# Patient Record
Sex: MALE | Race: WHITE | Employment: OTHER | URBAN - METROPOLITAN AREA
[De-identification: names, ages, dates, MRNs, and addresses within clinical notes are randomized per-mention and may not be internally consistent; named-entity substitution may affect disease eponyms.]

---

## 2017-01-01 ENCOUNTER — HOSPITAL ENCOUNTER (OUTPATIENT)
Dept: RADIATION THERAPY | Age: 82
Discharge: HOME | End: 2017-08-30
Payer: MEDICARE

## 2017-01-01 ENCOUNTER — HOSPITAL ENCOUNTER (INPATIENT)
Age: 82
LOS: 7 days | Discharge: HOSPICE | DRG: 092 | End: 2017-09-07
Attending: STUDENT IN AN ORGANIZED HEALTH CARE EDUCATION/TRAINING PROGRAM | Admitting: FAMILY MEDICINE
Payer: MEDICARE

## 2017-01-01 ENCOUNTER — OFFICE VISIT (OUTPATIENT)
Dept: ONCOLOGY | Age: 82
End: 2017-01-01

## 2017-01-01 ENCOUNTER — APPOINTMENT (OUTPATIENT)
Dept: RADIATION THERAPY | Age: 82
End: 2017-01-01
Payer: MEDICARE

## 2017-01-01 ENCOUNTER — HOSPITAL ENCOUNTER (OUTPATIENT)
Dept: RADIATION THERAPY | Age: 82
Discharge: HOME | End: 2017-08-23
Payer: MEDICARE

## 2017-01-01 ENCOUNTER — TELEPHONE (OUTPATIENT)
Dept: ONCOLOGY | Age: 82
End: 2017-01-01

## 2017-01-01 ENCOUNTER — APPOINTMENT (OUTPATIENT)
Dept: CT IMAGING | Age: 82
DRG: 092 | End: 2017-01-01
Attending: STUDENT IN AN ORGANIZED HEALTH CARE EDUCATION/TRAINING PROGRAM
Payer: MEDICARE

## 2017-01-01 ENCOUNTER — HOME CARE VISIT (OUTPATIENT)
Dept: HOSPICE | Facility: HOSPICE | Age: 82
End: 2017-01-01
Payer: MEDICARE

## 2017-01-01 ENCOUNTER — ANESTHESIA EVENT (OUTPATIENT)
Dept: ENDOSCOPY | Age: 82
End: 2017-01-01
Payer: MEDICARE

## 2017-01-01 ENCOUNTER — HOSPITAL ENCOUNTER (OUTPATIENT)
Dept: RADIATION THERAPY | Age: 82
Discharge: HOME | End: 2017-08-31
Payer: MEDICARE

## 2017-01-01 ENCOUNTER — HOME CARE VISIT (OUTPATIENT)
Dept: SCHEDULING | Facility: HOME HEALTH | Age: 82
End: 2017-01-01
Payer: MEDICARE

## 2017-01-01 ENCOUNTER — APPOINTMENT (OUTPATIENT)
Dept: GENERAL RADIOLOGY | Age: 82
DRG: 919 | End: 2017-01-01
Attending: SPECIALIST
Payer: MEDICARE

## 2017-01-01 ENCOUNTER — HOSPITAL ENCOUNTER (OUTPATIENT)
Dept: RADIATION THERAPY | Age: 82
Discharge: HOME | End: 2017-09-13
Payer: MEDICARE

## 2017-01-01 ENCOUNTER — OFFICE VISIT (OUTPATIENT)
Dept: FAMILY MEDICINE CLINIC | Age: 82
End: 2017-01-01

## 2017-01-01 ENCOUNTER — HOME CARE VISIT (OUTPATIENT)
Dept: HOME HEALTH SERVICES | Facility: HOME HEALTH | Age: 82
End: 2017-01-01
Payer: MEDICARE

## 2017-01-01 ENCOUNTER — HOSPITAL ENCOUNTER (INPATIENT)
Age: 82
LOS: 6 days | Discharge: HOME HEALTH | DRG: 919 | End: 2017-08-11
Attending: STUDENT IN AN ORGANIZED HEALTH CARE EDUCATION/TRAINING PROGRAM | Admitting: HOSPITALIST
Payer: MEDICARE

## 2017-01-01 ENCOUNTER — HOME CARE VISIT (OUTPATIENT)
Dept: HOME HEALTH SERVICES | Facility: HOME HEALTH | Age: 82
End: 2017-01-01

## 2017-01-01 ENCOUNTER — HOSPITAL ENCOUNTER (INPATIENT)
Age: 82
LOS: 5 days | Discharge: HOSPICE | End: 2017-10-10
Attending: INTERNAL MEDICINE | Admitting: INTERNAL MEDICINE

## 2017-01-01 ENCOUNTER — HOSPITAL ENCOUNTER (OUTPATIENT)
Dept: RADIATION THERAPY | Age: 82
Discharge: HOME | End: 2017-09-11
Payer: MEDICARE

## 2017-01-01 ENCOUNTER — DOCUMENTATION ONLY (OUTPATIENT)
Dept: ONCOLOGY | Age: 82
End: 2017-01-01

## 2017-01-01 ENCOUNTER — HOSPITAL ENCOUNTER (OUTPATIENT)
Dept: PET IMAGING | Age: 82
Discharge: HOME | End: 2017-07-25
Attending: SURGERY
Payer: MEDICARE

## 2017-01-01 ENCOUNTER — APPOINTMENT (OUTPATIENT)
Dept: RADIATION THERAPY | Age: 82
End: 2017-01-01

## 2017-01-01 ENCOUNTER — ANESTHESIA (OUTPATIENT)
Dept: ENDOSCOPY | Age: 82
End: 2017-01-01
Payer: MEDICARE

## 2017-01-01 ENCOUNTER — HOSPITAL ENCOUNTER (OUTPATIENT)
Age: 82
Setting detail: OUTPATIENT SURGERY
Discharge: HOME | End: 2017-06-28
Attending: INTERNAL MEDICINE | Admitting: INTERNAL MEDICINE
Payer: MEDICARE

## 2017-01-01 ENCOUNTER — HOSPITAL ENCOUNTER (OUTPATIENT)
Dept: INFUSION THERAPY | Age: 82
End: 2017-01-01
Payer: MEDICARE

## 2017-01-01 ENCOUNTER — HOSPITAL ENCOUNTER (OUTPATIENT)
Dept: RADIATION THERAPY | Age: 82
Discharge: HOME | End: 2017-09-06
Payer: MEDICARE

## 2017-01-01 ENCOUNTER — NURSE NAVIGATOR (OUTPATIENT)
Dept: ONCOLOGY | Age: 82
End: 2017-01-01

## 2017-01-01 ENCOUNTER — ANESTHESIA EVENT (OUTPATIENT)
Dept: SURGERY | Age: 82
DRG: 919 | End: 2017-01-01
Payer: MEDICARE

## 2017-01-01 ENCOUNTER — HOSPITAL ENCOUNTER (OUTPATIENT)
Dept: RADIATION THERAPY | Age: 82
Discharge: HOME | End: 2017-09-19
Payer: MEDICARE

## 2017-01-01 ENCOUNTER — HOSPITAL ENCOUNTER (OUTPATIENT)
Dept: ULTRASOUND IMAGING | Age: 82
Discharge: HOME | End: 2017-07-25
Attending: INTERNAL MEDICINE
Payer: MEDICARE

## 2017-01-01 ENCOUNTER — HOSPITAL ENCOUNTER (OUTPATIENT)
Dept: INFUSION THERAPY | Age: 82
Discharge: HOME | End: 2017-08-16
Payer: MEDICARE

## 2017-01-01 ENCOUNTER — OFFICE VISIT (OUTPATIENT)
Dept: SURGERY | Age: 82
End: 2017-01-01

## 2017-01-01 ENCOUNTER — HOME HEALTH ADMISSION (OUTPATIENT)
Dept: HOME HEALTH SERVICES | Facility: HOME HEALTH | Age: 82
End: 2017-01-01
Payer: MEDICARE

## 2017-01-01 ENCOUNTER — HOSPITAL ENCOUNTER (OUTPATIENT)
Dept: RADIATION THERAPY | Age: 82
Discharge: HOME | End: 2017-08-24
Payer: MEDICARE

## 2017-01-01 ENCOUNTER — HOSPITAL ENCOUNTER (OUTPATIENT)
Dept: INFUSION THERAPY | Age: 82
Discharge: HOME | End: 2017-08-24
Payer: MEDICARE

## 2017-01-01 ENCOUNTER — HOSPITAL ENCOUNTER (OUTPATIENT)
Dept: RADIATION THERAPY | Age: 82
Discharge: HOME | End: 2017-09-14
Payer: MEDICARE

## 2017-01-01 ENCOUNTER — HOSPITAL ENCOUNTER (OUTPATIENT)
Dept: RADIATION THERAPY | Age: 82
Discharge: HOME | End: 2017-08-25
Payer: MEDICARE

## 2017-01-01 ENCOUNTER — APPOINTMENT (OUTPATIENT)
Dept: GENERAL RADIOLOGY | Age: 82
DRG: 919 | End: 2017-01-01
Attending: SURGERY
Payer: MEDICARE

## 2017-01-01 ENCOUNTER — HOSPITAL ENCOUNTER (OUTPATIENT)
Dept: INFUSION THERAPY | Age: 82
End: 2017-01-01

## 2017-01-01 ENCOUNTER — ANESTHESIA (OUTPATIENT)
Dept: ENDOSCOPY | Age: 82
DRG: 919 | End: 2017-01-01
Payer: MEDICARE

## 2017-01-01 ENCOUNTER — APPOINTMENT (OUTPATIENT)
Dept: GENERAL RADIOLOGY | Age: 82
DRG: 919 | End: 2017-01-01
Attending: INTERNAL MEDICINE
Payer: MEDICARE

## 2017-01-01 ENCOUNTER — ANESTHESIA (OUTPATIENT)
Dept: SURGERY | Age: 82
DRG: 919 | End: 2017-01-01
Payer: MEDICARE

## 2017-01-01 ENCOUNTER — HOSPITAL ENCOUNTER (OUTPATIENT)
Dept: CT IMAGING | Age: 82
Discharge: HOME | End: 2017-07-06
Attending: INTERNAL MEDICINE
Payer: MEDICARE

## 2017-01-01 ENCOUNTER — NURSE NAVIGATOR (OUTPATIENT)
Dept: FAMILY MEDICINE CLINIC | Age: 82
End: 2017-01-01

## 2017-01-01 ENCOUNTER — HOSPITAL ENCOUNTER (OUTPATIENT)
Age: 82
Setting detail: OUTPATIENT SURGERY
Discharge: HOME | DRG: 919 | End: 2017-08-02
Attending: SURGERY | Admitting: SURGERY
Payer: MEDICARE

## 2017-01-01 ENCOUNTER — HOSPITAL ENCOUNTER (OUTPATIENT)
Age: 82
Setting detail: OUTPATIENT SURGERY
Discharge: HOME | End: 2017-07-31
Attending: INTERNAL MEDICINE | Admitting: INTERNAL MEDICINE
Payer: MEDICARE

## 2017-01-01 ENCOUNTER — HOSPITAL ENCOUNTER (OUTPATIENT)
Dept: RADIATION THERAPY | Age: 82
Discharge: HOME | End: 2017-08-03

## 2017-01-01 ENCOUNTER — APPOINTMENT (OUTPATIENT)
Dept: INFUSION THERAPY | Age: 82
End: 2017-01-01

## 2017-01-01 ENCOUNTER — HOSPICE ADMISSION (OUTPATIENT)
Dept: HOSPICE | Facility: HOSPICE | Age: 82
End: 2017-01-01
Payer: MEDICARE

## 2017-01-01 ENCOUNTER — HOSPITAL ENCOUNTER (OUTPATIENT)
Dept: RADIATION THERAPY | Age: 82
Discharge: HOME | End: 2017-09-01
Payer: MEDICARE

## 2017-01-01 ENCOUNTER — APPOINTMENT (OUTPATIENT)
Dept: GENERAL RADIOLOGY | Age: 82
DRG: 919 | End: 2017-01-01
Attending: ANESTHESIOLOGY
Payer: MEDICARE

## 2017-01-01 ENCOUNTER — APPOINTMENT (OUTPATIENT)
Dept: CT IMAGING | Age: 82
DRG: 919 | End: 2017-01-01
Attending: STUDENT IN AN ORGANIZED HEALTH CARE EDUCATION/TRAINING PROGRAM
Payer: MEDICARE

## 2017-01-01 ENCOUNTER — HOSPITAL ENCOUNTER (OUTPATIENT)
Dept: RADIATION THERAPY | Age: 82
Discharge: HOME | End: 2017-08-29
Payer: MEDICARE

## 2017-01-01 ENCOUNTER — HOSPITAL ENCOUNTER (OUTPATIENT)
Dept: RADIATION THERAPY | Age: 82
Discharge: HOME | End: 2017-09-15
Payer: MEDICARE

## 2017-01-01 ENCOUNTER — ANESTHESIA EVENT (OUTPATIENT)
Dept: ENDOSCOPY | Age: 82
DRG: 919 | End: 2017-01-01
Payer: MEDICARE

## 2017-01-01 ENCOUNTER — HOSPITAL ENCOUNTER (OUTPATIENT)
Dept: INFUSION THERAPY | Age: 82
Discharge: HOME | End: 2017-08-30
Payer: MEDICARE

## 2017-01-01 ENCOUNTER — HOSPITAL ENCOUNTER (OUTPATIENT)
Dept: RADIATION THERAPY | Age: 82
Discharge: HOME | End: 2017-09-07
Payer: MEDICARE

## 2017-01-01 ENCOUNTER — HOSPITAL ENCOUNTER (OUTPATIENT)
Age: 82
Setting detail: OUTPATIENT SURGERY
Discharge: HOME | End: 2017-07-24
Attending: INTERNAL MEDICINE | Admitting: INTERNAL MEDICINE
Payer: MEDICARE

## 2017-01-01 ENCOUNTER — HOSPITAL ENCOUNTER (OUTPATIENT)
Dept: RADIATION THERAPY | Age: 82
Discharge: HOME | End: 2017-09-12
Payer: MEDICARE

## 2017-01-01 ENCOUNTER — HOSPITAL ENCOUNTER (OUTPATIENT)
Dept: INFUSION THERAPY | Age: 82
Discharge: HOME | End: 2017-08-23
Payer: MEDICARE

## 2017-01-01 ENCOUNTER — HOSPITAL ENCOUNTER (OUTPATIENT)
Dept: RADIATION THERAPY | Age: 82
Discharge: HOME | End: 2017-09-05
Payer: MEDICARE

## 2017-01-01 ENCOUNTER — HOSPITAL ENCOUNTER (OUTPATIENT)
Dept: RADIATION THERAPY | Age: 82
Discharge: HOME | End: 2017-08-28
Payer: MEDICARE

## 2017-01-01 ENCOUNTER — HOSPITAL ENCOUNTER (OUTPATIENT)
Dept: RADIATION THERAPY | Age: 82
Discharge: HOME | End: 2017-09-18
Payer: MEDICARE

## 2017-01-01 VITALS
RESPIRATION RATE: 18 BRPM | WEIGHT: 166.23 LBS | BODY MASS INDEX: 22.54 KG/M2 | SYSTOLIC BLOOD PRESSURE: 145 MMHG | OXYGEN SATURATION: 97 % | TEMPERATURE: 97.8 F | DIASTOLIC BLOOD PRESSURE: 94 MMHG | HEART RATE: 88 BPM

## 2017-01-01 VITALS
OXYGEN SATURATION: 95 % | DIASTOLIC BLOOD PRESSURE: 80 MMHG | SYSTOLIC BLOOD PRESSURE: 131 MMHG | BODY MASS INDEX: 24.1 KG/M2 | TEMPERATURE: 98 F | HEART RATE: 88 BPM | WEIGHT: 177.91 LBS | HEIGHT: 72 IN | RESPIRATION RATE: 18 BRPM

## 2017-01-01 VITALS
SYSTOLIC BLOOD PRESSURE: 124 MMHG | HEART RATE: 81 BPM | TEMPERATURE: 98 F | RESPIRATION RATE: 18 BRPM | DIASTOLIC BLOOD PRESSURE: 68 MMHG | OXYGEN SATURATION: 96 %

## 2017-01-01 VITALS
OXYGEN SATURATION: 96 % | TEMPERATURE: 97.4 F | DIASTOLIC BLOOD PRESSURE: 78 MMHG | BODY MASS INDEX: 26.66 KG/M2 | HEART RATE: 91 BPM | HEIGHT: 72 IN | SYSTOLIC BLOOD PRESSURE: 108 MMHG | RESPIRATION RATE: 14 BRPM | WEIGHT: 196.8 LBS

## 2017-01-01 VITALS
HEART RATE: 87 BPM | RESPIRATION RATE: 18 BRPM | DIASTOLIC BLOOD PRESSURE: 60 MMHG | SYSTOLIC BLOOD PRESSURE: 120 MMHG | OXYGEN SATURATION: 98 %

## 2017-01-01 VITALS
DIASTOLIC BLOOD PRESSURE: 29 MMHG | RESPIRATION RATE: 14 BRPM | SYSTOLIC BLOOD PRESSURE: 153 MMHG | TEMPERATURE: 98.3 F | OXYGEN SATURATION: 96 % | HEART RATE: 60 BPM

## 2017-01-01 VITALS
TEMPERATURE: 96.8 F | RESPIRATION RATE: 16 BRPM | OXYGEN SATURATION: 93 % | WEIGHT: 179.2 LBS | HEART RATE: 83 BPM | SYSTOLIC BLOOD PRESSURE: 117 MMHG | HEIGHT: 72 IN | BODY MASS INDEX: 24.27 KG/M2 | DIASTOLIC BLOOD PRESSURE: 71 MMHG

## 2017-01-01 VITALS
WEIGHT: 180.8 LBS | RESPIRATION RATE: 18 BRPM | HEART RATE: 89 BPM | DIASTOLIC BLOOD PRESSURE: 85 MMHG | BODY MASS INDEX: 24.49 KG/M2 | TEMPERATURE: 97.5 F | SYSTOLIC BLOOD PRESSURE: 142 MMHG | HEIGHT: 72 IN | OXYGEN SATURATION: 95 %

## 2017-01-01 VITALS
SYSTOLIC BLOOD PRESSURE: 118 MMHG | HEART RATE: 79 BPM | DIASTOLIC BLOOD PRESSURE: 94 MMHG | OXYGEN SATURATION: 95 % | BODY MASS INDEX: 24.97 KG/M2 | WEIGHT: 184.38 LBS | RESPIRATION RATE: 9 BRPM | HEIGHT: 72 IN | TEMPERATURE: 98.4 F

## 2017-01-01 VITALS
SYSTOLIC BLOOD PRESSURE: 115 MMHG | HEART RATE: 96 BPM | OXYGEN SATURATION: 90 % | DIASTOLIC BLOOD PRESSURE: 60 MMHG | RESPIRATION RATE: 18 BRPM

## 2017-01-01 VITALS
OXYGEN SATURATION: 96 % | DIASTOLIC BLOOD PRESSURE: 78 MMHG | BODY MASS INDEX: 24.79 KG/M2 | WEIGHT: 183 LBS | TEMPERATURE: 97.8 F | RESPIRATION RATE: 16 BRPM | HEART RATE: 83 BPM | SYSTOLIC BLOOD PRESSURE: 129 MMHG | HEIGHT: 72 IN

## 2017-01-01 VITALS
RESPIRATION RATE: 20 BRPM | BODY MASS INDEX: 24.81 KG/M2 | HEIGHT: 72 IN | SYSTOLIC BLOOD PRESSURE: 106 MMHG | WEIGHT: 183.2 LBS | DIASTOLIC BLOOD PRESSURE: 79 MMHG | TEMPERATURE: 97 F | OXYGEN SATURATION: 97 % | HEART RATE: 89 BPM

## 2017-01-01 VITALS
SYSTOLIC BLOOD PRESSURE: 102 MMHG | TEMPERATURE: 97.5 F | HEART RATE: 90 BPM | RESPIRATION RATE: 16 BRPM | WEIGHT: 179.6 LBS | OXYGEN SATURATION: 98 % | BODY MASS INDEX: 24.33 KG/M2 | HEIGHT: 72 IN | DIASTOLIC BLOOD PRESSURE: 66 MMHG

## 2017-01-01 VITALS
TEMPERATURE: 97.9 F | SYSTOLIC BLOOD PRESSURE: 136 MMHG | BODY MASS INDEX: 27.96 KG/M2 | WEIGHT: 206.4 LBS | OXYGEN SATURATION: 98 % | BODY MASS INDEX: 26.01 KG/M2 | OXYGEN SATURATION: 95 % | DIASTOLIC BLOOD PRESSURE: 80 MMHG | WEIGHT: 192 LBS | SYSTOLIC BLOOD PRESSURE: 146 MMHG | HEART RATE: 85 BPM | HEART RATE: 78 BPM | RESPIRATION RATE: 18 BRPM | TEMPERATURE: 98 F | RESPIRATION RATE: 14 BRPM | DIASTOLIC BLOOD PRESSURE: 79 MMHG | HEIGHT: 72 IN | HEIGHT: 72 IN

## 2017-01-01 VITALS
RESPIRATION RATE: 14 BRPM | HEIGHT: 73 IN | SYSTOLIC BLOOD PRESSURE: 155 MMHG | HEART RATE: 80 BPM | TEMPERATURE: 97.9 F | DIASTOLIC BLOOD PRESSURE: 100 MMHG | OXYGEN SATURATION: 91 %

## 2017-01-01 VITALS
OXYGEN SATURATION: 96 % | RESPIRATION RATE: 18 BRPM | HEART RATE: 89 BPM | DIASTOLIC BLOOD PRESSURE: 70 MMHG | SYSTOLIC BLOOD PRESSURE: 130 MMHG

## 2017-01-01 VITALS
HEART RATE: 78 BPM | OXYGEN SATURATION: 96 % | SYSTOLIC BLOOD PRESSURE: 110 MMHG | RESPIRATION RATE: 16 BRPM | DIASTOLIC BLOOD PRESSURE: 70 MMHG | TEMPERATURE: 98 F

## 2017-01-01 VITALS
WEIGHT: 188.8 LBS | TEMPERATURE: 97.7 F | HEART RATE: 88 BPM | RESPIRATION RATE: 14 BRPM | OXYGEN SATURATION: 94 % | HEIGHT: 72 IN | DIASTOLIC BLOOD PRESSURE: 93 MMHG | SYSTOLIC BLOOD PRESSURE: 133 MMHG | BODY MASS INDEX: 25.57 KG/M2

## 2017-01-01 VITALS — HEIGHT: 72 IN | BODY MASS INDEX: 27.09 KG/M2 | WEIGHT: 200 LBS

## 2017-01-01 VITALS
RESPIRATION RATE: 16 BRPM | BODY MASS INDEX: 25.47 KG/M2 | DIASTOLIC BLOOD PRESSURE: 84 MMHG | WEIGHT: 188 LBS | OXYGEN SATURATION: 96 % | TEMPERATURE: 98 F | HEIGHT: 72 IN | HEART RATE: 82 BPM | SYSTOLIC BLOOD PRESSURE: 128 MMHG

## 2017-01-01 VITALS
RESPIRATION RATE: 18 BRPM | DIASTOLIC BLOOD PRESSURE: 80 MMHG | OXYGEN SATURATION: 90 % | HEART RATE: 79 BPM | SYSTOLIC BLOOD PRESSURE: 120 MMHG

## 2017-01-01 VITALS
HEART RATE: 104 BPM | DIASTOLIC BLOOD PRESSURE: 90 MMHG | OXYGEN SATURATION: 96 % | SYSTOLIC BLOOD PRESSURE: 128 MMHG | RESPIRATION RATE: 16 BRPM

## 2017-01-01 VITALS
DIASTOLIC BLOOD PRESSURE: 74 MMHG | HEART RATE: 90 BPM | SYSTOLIC BLOOD PRESSURE: 132 MMHG | BODY MASS INDEX: 22.62 KG/M2 | HEIGHT: 72 IN | WEIGHT: 167 LBS | OXYGEN SATURATION: 96 % | RESPIRATION RATE: 20 BRPM

## 2017-01-01 VITALS
DIASTOLIC BLOOD PRESSURE: 95 MMHG | OXYGEN SATURATION: 95 % | RESPIRATION RATE: 18 BRPM | HEART RATE: 82 BPM | TEMPERATURE: 98.6 F | SYSTOLIC BLOOD PRESSURE: 160 MMHG

## 2017-01-01 DIAGNOSIS — R40.4 TRANSIENT ALTERATION OF AWARENESS: ICD-10-CM

## 2017-01-01 DIAGNOSIS — R41.0 DISORIENTATION: Primary | ICD-10-CM

## 2017-01-01 DIAGNOSIS — Z12.11 ENCOUNTER FOR HEMOCCULT SCREENING: ICD-10-CM

## 2017-01-01 DIAGNOSIS — C16.0 GE JUNCTION CARCINOMA (HCC): Primary | ICD-10-CM

## 2017-01-01 DIAGNOSIS — R47.02 DYSPHASIA: ICD-10-CM

## 2017-01-01 DIAGNOSIS — R11.0 NAUSEA: ICD-10-CM

## 2017-01-01 DIAGNOSIS — Z71.89 ADVANCE DIRECTIVE DISCUSSED WITH PATIENT: ICD-10-CM

## 2017-01-01 DIAGNOSIS — R13.19 ESOPHAGEAL DYSPHAGIA: ICD-10-CM

## 2017-01-01 DIAGNOSIS — T45.1X5A CHEMOTHERAPY-INDUCED NAUSEA: ICD-10-CM

## 2017-01-01 DIAGNOSIS — R10.30 LOWER ABDOMINAL PAIN: Primary | ICD-10-CM

## 2017-01-01 DIAGNOSIS — K21.9 GASTROESOPHAGEAL REFLUX DISEASE WITHOUT ESOPHAGITIS: ICD-10-CM

## 2017-01-01 DIAGNOSIS — Z71.89 ADVANCED DIRECTIVES, COUNSELING/DISCUSSION: ICD-10-CM

## 2017-01-01 DIAGNOSIS — R53.81 PHYSICAL DECONDITIONING: ICD-10-CM

## 2017-01-01 DIAGNOSIS — R41.82 ALTERED MENTAL STATUS, UNSPECIFIED ALTERED MENTAL STATUS TYPE: Primary | ICD-10-CM

## 2017-01-01 DIAGNOSIS — Z00.00 ROUTINE GENERAL MEDICAL EXAMINATION AT A HEALTH CARE FACILITY: Primary | ICD-10-CM

## 2017-01-01 DIAGNOSIS — Z13.5 SCREENING FOR GLAUCOMA: ICD-10-CM

## 2017-01-01 DIAGNOSIS — K92.1 BLOOD IN STOOL: ICD-10-CM

## 2017-01-01 DIAGNOSIS — Z13.39 SCREENING FOR ALCOHOLISM: ICD-10-CM

## 2017-01-01 DIAGNOSIS — K59.09 CHRONIC CONSTIPATION: ICD-10-CM

## 2017-01-01 DIAGNOSIS — H91.93 DECREASED HEARING OF BOTH EARS: ICD-10-CM

## 2017-01-01 DIAGNOSIS — R11.0 CHEMOTHERAPY-INDUCED NAUSEA: ICD-10-CM

## 2017-01-01 DIAGNOSIS — Z23 ENCOUNTER FOR IMMUNIZATION: ICD-10-CM

## 2017-01-01 DIAGNOSIS — R52 PAIN: ICD-10-CM

## 2017-01-01 DIAGNOSIS — Z66 DNR (DO NOT RESUSCITATE): ICD-10-CM

## 2017-01-01 DIAGNOSIS — C16.0 GE JUNCTION CARCINOMA (HCC): ICD-10-CM

## 2017-01-01 DIAGNOSIS — E78.00 HYPERCHOLESTEROLEMIA: ICD-10-CM

## 2017-01-01 DIAGNOSIS — R10.30 LOWER ABDOMINAL PAIN: ICD-10-CM

## 2017-01-01 DIAGNOSIS — R63.4 LOSS OF WEIGHT: ICD-10-CM

## 2017-01-01 DIAGNOSIS — R10.13 ABDOMINAL PAIN, EPIGASTRIC: ICD-10-CM

## 2017-01-01 DIAGNOSIS — R10.13 DYSPEPSIA AND OTHER SPECIFIED DISORDERS OF FUNCTION OF STOMACH: ICD-10-CM

## 2017-01-01 DIAGNOSIS — K31.89 DYSPEPSIA AND OTHER SPECIFIED DISORDERS OF FUNCTION OF STOMACH: ICD-10-CM

## 2017-01-01 DIAGNOSIS — I10 ESSENTIAL HYPERTENSION: ICD-10-CM

## 2017-01-01 LAB
ALBUMIN SERPL BCP-MCNC: 3 G/DL (ref 3.5–5)
ALBUMIN SERPL BCP-MCNC: 3.2 G/DL (ref 3.5–5)
ALBUMIN SERPL-MCNC: 2.9 G/DL (ref 3.5–5)
ALBUMIN SERPL-MCNC: 3 G/DL (ref 3.5–5)
ALBUMIN SERPL-MCNC: 4.5 G/DL (ref 3.5–4.7)
ALBUMIN SERPL-MCNC: 4.7 G/DL (ref 3.5–4.7)
ALBUMIN/GLOB SERPL: 0.8 {RATIO} (ref 1.1–2.2)
ALBUMIN/GLOB SERPL: 0.8 {RATIO} (ref 1.1–2.2)
ALBUMIN/GLOB SERPL: 0.9 {RATIO} (ref 1.1–2.2)
ALBUMIN/GLOB SERPL: 0.9 {RATIO} (ref 1.1–2.2)
ALBUMIN/GLOB SERPL: 1.9 {RATIO} (ref 1.2–2.2)
ALBUMIN/GLOB SERPL: 2.1 {RATIO} (ref 1.2–2.2)
ALP SERPL-CCNC: 105 IU/L (ref 39–117)
ALP SERPL-CCNC: 120 U/L (ref 45–117)
ALP SERPL-CCNC: 123 IU/L (ref 39–117)
ALP SERPL-CCNC: 123 U/L (ref 45–117)
ALP SERPL-CCNC: 132 U/L (ref 45–117)
ALP SERPL-CCNC: 146 U/L (ref 45–117)
ALT SERPL-CCNC: 19 IU/L (ref 0–44)
ALT SERPL-CCNC: 19 IU/L (ref 0–44)
ALT SERPL-CCNC: 48 U/L (ref 12–78)
ALT SERPL-CCNC: 48 U/L (ref 12–78)
ALT SERPL-CCNC: 53 U/L (ref 12–78)
ALT SERPL-CCNC: 53 U/L (ref 12–78)
AMMONIA PLAS-SCNC: 11 UMOL/L
AMMONIA PLAS-SCNC: <10 UMOL/L
AMPHET UR QL SCN: NEGATIVE
AMYLASE SERPL-CCNC: 86 U/L (ref 31–124)
ANION GAP BLD CALC-SCNC: 10 MMOL/L (ref 5–15)
ANION GAP BLD CALC-SCNC: 11 MMOL/L (ref 5–15)
ANION GAP BLD CALC-SCNC: 18 MMOL/L (ref 5–15)
ANION GAP BLD CALC-SCNC: 5 MMOL/L (ref 5–15)
ANION GAP BLD CALC-SCNC: 6 MMOL/L (ref 5–15)
ANION GAP BLD CALC-SCNC: 7 MMOL/L (ref 5–15)
ANION GAP BLD CALC-SCNC: 8 MMOL/L (ref 5–15)
ANION GAP BLD CALC-SCNC: 9 MMOL/L (ref 5–15)
ANION GAP SERPL CALC-SCNC: 13 MMOL/L (ref 5–15)
ANION GAP SERPL CALC-SCNC: 6 MMOL/L (ref 5–15)
ANION GAP SERPL CALC-SCNC: 7 MMOL/L (ref 5–15)
ANION GAP SERPL CALC-SCNC: 8 MMOL/L (ref 5–15)
APAP SERPL-MCNC: <2 UG/ML (ref 10–30)
APPEARANCE UR: CLEAR
APPEARANCE UR: CLEAR
AST SERPL W P-5'-P-CCNC: 37 U/L (ref 15–37)
AST SERPL W P-5'-P-CCNC: 42 U/L (ref 15–37)
AST SERPL-CCNC: 16 IU/L (ref 0–40)
AST SERPL-CCNC: 21 IU/L (ref 0–40)
AST SERPL-CCNC: 29 U/L (ref 15–37)
AST SERPL-CCNC: 37 U/L (ref 15–37)
ATRIAL RATE: 137 BPM
ATRIAL RATE: 74 BPM
ATRIAL RATE: 89 BPM
BACTERIA URNS QL MICRO: NEGATIVE /HPF
BARBITURATES UR QL SCN: NEGATIVE
BASOPHILS # BLD AUTO: 0 K/UL (ref 0–0.1)
BASOPHILS # BLD: 0 % (ref 0–1)
BASOPHILS # BLD: 0 K/UL (ref 0–0.1)
BASOPHILS NFR BLD: 0 % (ref 0–1)
BENZODIAZ UR QL: NEGATIVE
BILIRUB DIRECT SERPL-MCNC: 0.1 MG/DL (ref 0–0.2)
BILIRUB SERPL-MCNC: 0.4 MG/DL (ref 0.2–1)
BILIRUB SERPL-MCNC: 0.5 MG/DL (ref 0.2–1)
BILIRUB SERPL-MCNC: 0.5 MG/DL (ref 0.2–1)
BILIRUB SERPL-MCNC: 0.6 MG/DL (ref 0–1.2)
BILIRUB SERPL-MCNC: 0.6 MG/DL (ref 0–1.2)
BILIRUB SERPL-MCNC: 0.9 MG/DL (ref 0.2–1)
BILIRUB UR QL: NEGATIVE
BILIRUB UR QL: NEGATIVE
BUN BLD-MCNC: 25 MG/DL (ref 9–20)
BUN SERPL-MCNC: 13 MG/DL (ref 6–20)
BUN SERPL-MCNC: 15 MG/DL (ref 6–20)
BUN SERPL-MCNC: 18 MG/DL (ref 6–20)
BUN SERPL-MCNC: 19 MG/DL (ref 6–20)
BUN SERPL-MCNC: 20 MG/DL (ref 6–20)
BUN SERPL-MCNC: 21 MG/DL (ref 8–27)
BUN SERPL-MCNC: 22 MG/DL (ref 6–20)
BUN SERPL-MCNC: 24 MG/DL (ref 6–20)
BUN SERPL-MCNC: 25 MG/DL (ref 8–27)
BUN SERPL-MCNC: 30 MG/DL (ref 6–20)
BUN SERPL-MCNC: 31 MG/DL (ref 6–20)
BUN SERPL-MCNC: 32 MG/DL (ref 6–20)
BUN/CREAT SERPL: 11 (ref 12–20)
BUN/CREAT SERPL: 11 (ref 12–20)
BUN/CREAT SERPL: 13 (ref 10–24)
BUN/CREAT SERPL: 14 (ref 12–20)
BUN/CREAT SERPL: 14 (ref 12–20)
BUN/CREAT SERPL: 15 (ref 10–24)
BUN/CREAT SERPL: 15 (ref 12–20)
BUN/CREAT SERPL: 16 (ref 12–20)
BUN/CREAT SERPL: 16 (ref 12–20)
BUN/CREAT SERPL: 17 (ref 12–20)
BUN/CREAT SERPL: 18 (ref 12–20)
BUN/CREAT SERPL: 18 (ref 12–20)
BUN/CREAT SERPL: 19 (ref 12–20)
BUN/CREAT SERPL: 19 (ref 12–20)
BUN/CREAT SERPL: 20 (ref 12–20)
CA-I BLD-MCNC: 1.2 MMOL/L (ref 1.12–1.32)
CALCIUM SERPL-MCNC: 10 MG/DL (ref 8.6–10.2)
CALCIUM SERPL-MCNC: 8.2 MG/DL (ref 8.5–10.1)
CALCIUM SERPL-MCNC: 8.5 MG/DL (ref 8.5–10.1)
CALCIUM SERPL-MCNC: 8.5 MG/DL (ref 8.5–10.1)
CALCIUM SERPL-MCNC: 8.6 MG/DL (ref 8.5–10.1)
CALCIUM SERPL-MCNC: 8.6 MG/DL (ref 8.5–10.1)
CALCIUM SERPL-MCNC: 8.7 MG/DL (ref 8.5–10.1)
CALCIUM SERPL-MCNC: 8.9 MG/DL (ref 8.5–10.1)
CALCIUM SERPL-MCNC: 8.9 MG/DL (ref 8.5–10.1)
CALCIUM SERPL-MCNC: 9 MG/DL (ref 8.5–10.1)
CALCIUM SERPL-MCNC: 9.1 MG/DL (ref 8.5–10.1)
CALCIUM SERPL-MCNC: 9.1 MG/DL (ref 8.5–10.1)
CALCIUM SERPL-MCNC: 9.3 MG/DL (ref 8.5–10.1)
CALCIUM SERPL-MCNC: 9.4 MG/DL (ref 8.5–10.1)
CALCIUM SERPL-MCNC: 9.5 MG/DL (ref 8.6–10.2)
CALCULATED P AXIS, ECG09: 17 DEGREES
CALCULATED P AXIS, ECG09: 37 DEGREES
CALCULATED P AXIS, ECG09: 63 DEGREES
CALCULATED R AXIS, ECG10: -30 DEGREES
CALCULATED R AXIS, ECG10: -63 DEGREES
CALCULATED R AXIS, ECG10: 4 DEGREES
CALCULATED T AXIS, ECG11: 33 DEGREES
CALCULATED T AXIS, ECG11: 66 DEGREES
CALCULATED T AXIS, ECG11: 76 DEGREES
CANNABINOIDS UR QL SCN: NEGATIVE
CHLORIDE BLD-SCNC: 97 MMOL/L (ref 98–107)
CHLORIDE SERPL-SCNC: 102 MMOL/L (ref 97–108)
CHLORIDE SERPL-SCNC: 103 MMOL/L (ref 97–108)
CHLORIDE SERPL-SCNC: 104 MMOL/L (ref 97–108)
CHLORIDE SERPL-SCNC: 104 MMOL/L (ref 97–108)
CHLORIDE SERPL-SCNC: 105 MMOL/L (ref 97–108)
CHLORIDE SERPL-SCNC: 107 MMOL/L (ref 97–108)
CHLORIDE SERPL-SCNC: 96 MMOL/L (ref 97–108)
CHLORIDE SERPL-SCNC: 97 MMOL/L (ref 96–106)
CHLORIDE SERPL-SCNC: 97 MMOL/L (ref 97–108)
CHLORIDE SERPL-SCNC: 99 MMOL/L (ref 96–106)
CHLORIDE SERPL-SCNC: 99 MMOL/L (ref 97–108)
CHOLEST SERPL-MCNC: 197 MG/DL (ref 100–199)
CO2 BLD-SCNC: 26 MMOL/L (ref 21–32)
CO2 SERPL-SCNC: 21 MMOL/L (ref 21–32)
CO2 SERPL-SCNC: 23 MMOL/L (ref 18–29)
CO2 SERPL-SCNC: 23 MMOL/L (ref 21–32)
CO2 SERPL-SCNC: 24 MMOL/L (ref 21–32)
CO2 SERPL-SCNC: 25 MMOL/L (ref 18–29)
CO2 SERPL-SCNC: 25 MMOL/L (ref 21–32)
CO2 SERPL-SCNC: 26 MMOL/L (ref 21–32)
CO2 SERPL-SCNC: 27 MMOL/L (ref 21–32)
CO2 SERPL-SCNC: 29 MMOL/L (ref 21–32)
CO2 SERPL-SCNC: 30 MMOL/L (ref 21–32)
CO2 SERPL-SCNC: 30 MMOL/L (ref 21–32)
COCAINE UR QL SCN: NEGATIVE
COLOR UR: ABNORMAL
COLOR UR: ABNORMAL
CREAT BLD-MCNC: 1.5 MG/DL (ref 0.6–1.3)
CREAT SERPL-MCNC: 1.17 MG/DL (ref 0.7–1.3)
CREAT SERPL-MCNC: 1.22 MG/DL (ref 0.7–1.3)
CREAT SERPL-MCNC: 1.25 MG/DL (ref 0.7–1.3)
CREAT SERPL-MCNC: 1.31 MG/DL (ref 0.7–1.3)
CREAT SERPL-MCNC: 1.32 MG/DL (ref 0.7–1.3)
CREAT SERPL-MCNC: 1.33 MG/DL (ref 0.7–1.3)
CREAT SERPL-MCNC: 1.34 MG/DL (ref 0.7–1.3)
CREAT SERPL-MCNC: 1.41 MG/DL (ref 0.7–1.3)
CREAT SERPL-MCNC: 1.41 MG/DL (ref 0.7–1.3)
CREAT SERPL-MCNC: 1.5 MG/DL (ref 0.7–1.3)
CREAT SERPL-MCNC: 1.56 MG/DL (ref 0.7–1.3)
CREAT SERPL-MCNC: 1.57 MG/DL (ref 0.7–1.3)
CREAT SERPL-MCNC: 1.63 MG/DL (ref 0.7–1.3)
CREAT SERPL-MCNC: 1.64 MG/DL (ref 0.76–1.27)
CREAT SERPL-MCNC: 1.68 MG/DL (ref 0.76–1.27)
DIAGNOSIS, 93000: NORMAL
DIFFERENTIAL METHOD BLD: ABNORMAL
DRUG SCRN COMMENT,DRGCM: NORMAL
EOSINOPHIL # BLD: 0 K/UL (ref 0–0.4)
EOSINOPHIL # BLD: 0.3 K/UL (ref 0–0.4)
EOSINOPHIL # BLD: 0.4 K/UL (ref 0–0.4)
EOSINOPHIL NFR BLD: 0 % (ref 0–7)
EOSINOPHIL NFR BLD: 3 % (ref 0–7)
EOSINOPHIL NFR BLD: 5 % (ref 0–7)
EOSINOPHIL NFR BLD: 6 % (ref 0–7)
EOSINOPHIL NFR BLD: 8 % (ref 0–7)
EPITH CASTS URNS QL MICRO: ABNORMAL /LPF
ERYTHROCYTE [DISTWIDTH] IN BLOOD BY AUTOMATED COUNT: 12 % (ref 11.5–14.5)
ERYTHROCYTE [DISTWIDTH] IN BLOOD BY AUTOMATED COUNT: 12.1 % (ref 11.5–14.5)
ERYTHROCYTE [DISTWIDTH] IN BLOOD BY AUTOMATED COUNT: 12.2 % (ref 11.5–14.5)
ERYTHROCYTE [DISTWIDTH] IN BLOOD BY AUTOMATED COUNT: 12.2 % (ref 11.5–14.5)
ERYTHROCYTE [DISTWIDTH] IN BLOOD BY AUTOMATED COUNT: 12.3 % (ref 11.5–14.5)
ERYTHROCYTE [DISTWIDTH] IN BLOOD BY AUTOMATED COUNT: 12.4 % (ref 11.5–14.5)
ERYTHROCYTE [DISTWIDTH] IN BLOOD BY AUTOMATED COUNT: 12.9 % (ref 11.5–14.5)
ERYTHROCYTE [DISTWIDTH] IN BLOOD BY AUTOMATED COUNT: 13.1 % (ref 11.5–14.5)
ERYTHROCYTE [DISTWIDTH] IN BLOOD BY AUTOMATED COUNT: 13.2 % (ref 11.5–14.5)
ERYTHROCYTE [DISTWIDTH] IN BLOOD BY AUTOMATED COUNT: 13.3 % (ref 11.5–14.5)
ERYTHROCYTE [DISTWIDTH] IN BLOOD BY AUTOMATED COUNT: 13.3 % (ref 12.3–15.4)
ERYTHROCYTE [DISTWIDTH] IN BLOOD BY AUTOMATED COUNT: 13.7 % (ref 12.3–15.4)
EST. AVERAGE GLUCOSE BLD GHB EST-MCNC: 105 MG/DL
ETHANOL SERPL-MCNC: <10 MG/DL
GLOBULIN SER CALC-MCNC: 2.2 G/DL (ref 1.5–4.5)
GLOBULIN SER CALC-MCNC: 2.4 G/DL (ref 1.5–4.5)
GLOBULIN SER CALC-MCNC: 3.4 G/DL (ref 2–4)
GLOBULIN SER CALC-MCNC: 3.5 G/DL (ref 2–4)
GLOBULIN SER CALC-MCNC: 3.6 G/DL (ref 2–4)
GLOBULIN SER CALC-MCNC: 3.6 G/DL (ref 2–4)
GLUCOSE BLD STRIP.AUTO-MCNC: 100 MG/DL (ref 65–100)
GLUCOSE BLD STRIP.AUTO-MCNC: 102 MG/DL (ref 65–100)
GLUCOSE BLD STRIP.AUTO-MCNC: 103 MG/DL (ref 65–100)
GLUCOSE BLD STRIP.AUTO-MCNC: 105 MG/DL (ref 65–100)
GLUCOSE BLD STRIP.AUTO-MCNC: 106 MG/DL (ref 65–100)
GLUCOSE BLD STRIP.AUTO-MCNC: 107 MG/DL (ref 65–100)
GLUCOSE BLD STRIP.AUTO-MCNC: 108 MG/DL (ref 65–100)
GLUCOSE BLD STRIP.AUTO-MCNC: 111 MG/DL (ref 65–100)
GLUCOSE BLD STRIP.AUTO-MCNC: 111 MG/DL (ref 65–100)
GLUCOSE BLD STRIP.AUTO-MCNC: 113 MG/DL (ref 65–100)
GLUCOSE BLD STRIP.AUTO-MCNC: 115 MG/DL (ref 65–100)
GLUCOSE BLD STRIP.AUTO-MCNC: 115 MG/DL (ref 65–100)
GLUCOSE BLD STRIP.AUTO-MCNC: 116 MG/DL (ref 65–100)
GLUCOSE BLD STRIP.AUTO-MCNC: 116 MG/DL (ref 65–100)
GLUCOSE BLD STRIP.AUTO-MCNC: 129 MG/DL (ref 65–100)
GLUCOSE BLD STRIP.AUTO-MCNC: 130 MG/DL (ref 65–100)
GLUCOSE BLD STRIP.AUTO-MCNC: 132 MG/DL (ref 65–100)
GLUCOSE BLD STRIP.AUTO-MCNC: 132 MG/DL (ref 65–100)
GLUCOSE BLD STRIP.AUTO-MCNC: 138 MG/DL (ref 65–100)
GLUCOSE BLD STRIP.AUTO-MCNC: 145 MG/DL (ref 65–100)
GLUCOSE BLD STRIP.AUTO-MCNC: 149 MG/DL (ref 65–100)
GLUCOSE BLD STRIP.AUTO-MCNC: 164 MG/DL (ref 65–100)
GLUCOSE BLD STRIP.AUTO-MCNC: 88 MG/DL (ref 65–100)
GLUCOSE BLD STRIP.AUTO-MCNC: 95 MG/DL (ref 65–100)
GLUCOSE BLD STRIP.AUTO-MCNC: 98 MG/DL (ref 65–100)
GLUCOSE BLD STRIP.AUTO-MCNC: 99 MG/DL (ref 65–100)
GLUCOSE BLD STRIP.AUTO-MCNC: 99 MG/DL (ref 65–100)
GLUCOSE BLD-MCNC: 118 MG/DL (ref 65–100)
GLUCOSE SERPL-MCNC: 100 MG/DL (ref 65–100)
GLUCOSE SERPL-MCNC: 100 MG/DL (ref 65–100)
GLUCOSE SERPL-MCNC: 101 MG/DL (ref 65–100)
GLUCOSE SERPL-MCNC: 102 MG/DL (ref 65–100)
GLUCOSE SERPL-MCNC: 104 MG/DL (ref 65–100)
GLUCOSE SERPL-MCNC: 104 MG/DL (ref 65–100)
GLUCOSE SERPL-MCNC: 106 MG/DL (ref 65–100)
GLUCOSE SERPL-MCNC: 110 MG/DL (ref 65–100)
GLUCOSE SERPL-MCNC: 139 MG/DL (ref 65–100)
GLUCOSE SERPL-MCNC: 251 MG/DL (ref 65–100)
GLUCOSE SERPL-MCNC: 84 MG/DL (ref 65–100)
GLUCOSE SERPL-MCNC: 85 MG/DL (ref 65–99)
GLUCOSE SERPL-MCNC: 86 MG/DL (ref 65–100)
GLUCOSE SERPL-MCNC: 92 MG/DL (ref 65–100)
GLUCOSE SERPL-MCNC: 99 MG/DL (ref 65–99)
GLUCOSE UR STRIP.AUTO-MCNC: NEGATIVE MG/DL
GLUCOSE UR STRIP.AUTO-MCNC: NEGATIVE MG/DL
HBA1C MFR BLD: 5.3 % (ref 4.2–6.3)
HCT VFR BLD AUTO: 27.2 % (ref 36.6–50.3)
HCT VFR BLD AUTO: 28.5 % (ref 36.6–50.3)
HCT VFR BLD AUTO: 28.8 % (ref 36.6–50.3)
HCT VFR BLD AUTO: 29.1 % (ref 36.6–50.3)
HCT VFR BLD AUTO: 30 % (ref 36.6–50.3)
HCT VFR BLD AUTO: 30.6 % (ref 36.6–50.3)
HCT VFR BLD AUTO: 31.1 % (ref 36.6–50.3)
HCT VFR BLD AUTO: 32.8 % (ref 36.6–50.3)
HCT VFR BLD AUTO: 33.2 % (ref 36.6–50.3)
HCT VFR BLD AUTO: 37.8 % (ref 36.6–50.3)
HCT VFR BLD AUTO: 40.7 % (ref 37.5–51)
HCT VFR BLD AUTO: 40.7 % (ref 37.5–51)
HCT VFR BLD CALC: 38 % (ref 36.6–50.3)
HDLC SERPL-MCNC: 40 MG/DL
HEMOCCULT STL QL IA: NEGATIVE
HGB BLD-MCNC: 10.1 G/DL (ref 12.1–17)
HGB BLD-MCNC: 10.2 G/DL (ref 12.1–17)
HGB BLD-MCNC: 10.2 G/DL (ref 12.1–17)
HGB BLD-MCNC: 10.5 G/DL (ref 12.1–17)
HGB BLD-MCNC: 10.7 G/DL (ref 12.1–17)
HGB BLD-MCNC: 11.5 G/DL (ref 12.1–17)
HGB BLD-MCNC: 11.8 G/DL (ref 12.1–17)
HGB BLD-MCNC: 12.9 GM/DL (ref 12.1–17)
HGB BLD-MCNC: 13.3 G/DL (ref 12.1–17)
HGB BLD-MCNC: 13.8 G/DL (ref 12.6–17.7)
HGB BLD-MCNC: 14 G/DL (ref 12.6–17.7)
HGB BLD-MCNC: 9.6 G/DL (ref 12.1–17)
HGB BLD-MCNC: 9.9 G/DL (ref 12.1–17)
HGB UR QL STRIP: ABNORMAL
HGB UR QL STRIP: NEGATIVE
HYALINE CASTS URNS QL MICRO: ABNORMAL /LPF (ref 0–5)
INR BLD: 1.1 (ref 0.9–1.2)
INR BLD: 1.1 (ref 0.9–1.2)
INTERPRETATION: NORMAL
INTERPRETATION: NORMAL
KETONES UR QL STRIP.AUTO: NEGATIVE MG/DL
KETONES UR QL STRIP.AUTO: NEGATIVE MG/DL
LACTATE SERPL-SCNC: 1.9 MMOL/L (ref 0.4–2)
LEUKOCYTE ESTERASE UR QL STRIP.AUTO: NEGATIVE
LEUKOCYTE ESTERASE UR QL STRIP.AUTO: NEGATIVE
LIPASE SERPL-CCNC: 40 U/L (ref 0–59)
LYMPHOCYTES # BLD AUTO: 12 % (ref 12–49)
LYMPHOCYTES # BLD AUTO: 15 % (ref 12–49)
LYMPHOCYTES # BLD AUTO: 16 % (ref 12–49)
LYMPHOCYTES # BLD AUTO: 2 % (ref 12–49)
LYMPHOCYTES # BLD: 0.1 K/UL (ref 0.8–3.5)
LYMPHOCYTES # BLD: 0.2 K/UL (ref 0.8–3.5)
LYMPHOCYTES # BLD: 0.2 K/UL (ref 0.8–3.5)
LYMPHOCYTES # BLD: 0.3 K/UL (ref 0.8–3.5)
LYMPHOCYTES # BLD: 0.5 K/UL (ref 0.8–3.5)
LYMPHOCYTES # BLD: 0.8 K/UL (ref 0.8–3.5)
LYMPHOCYTES # BLD: 1 K/UL (ref 0.8–3.5)
LYMPHOCYTES # BLD: 1.1 K/UL (ref 0.8–3.5)
LYMPHOCYTES NFR BLD: 10 % (ref 12–49)
LYMPHOCYTES NFR BLD: 2 % (ref 12–49)
LYMPHOCYTES NFR BLD: 6 % (ref 12–49)
LYMPHOCYTES NFR BLD: 9 % (ref 12–49)
MAGNESIUM SERPL-MCNC: 2 MG/DL (ref 1.6–2.4)
MAGNESIUM SERPL-MCNC: 2.1 MG/DL (ref 1.6–2.4)
MCH RBC QN AUTO: 31.4 PG (ref 26–34)
MCH RBC QN AUTO: 31.5 PG (ref 26–34)
MCH RBC QN AUTO: 31.8 PG (ref 26–34)
MCH RBC QN AUTO: 31.8 PG (ref 26–34)
MCH RBC QN AUTO: 32 PG (ref 26–34)
MCH RBC QN AUTO: 32.1 PG (ref 26–34)
MCH RBC QN AUTO: 32.3 PG (ref 26–34)
MCH RBC QN AUTO: 32.4 PG (ref 26–34)
MCH RBC QN AUTO: 32.6 PG (ref 26–34)
MCH RBC QN AUTO: 32.9 PG (ref 26.6–33)
MCH RBC QN AUTO: 32.9 PG (ref 26–34)
MCH RBC QN AUTO: 33.2 PG (ref 26.6–33)
MCHC RBC AUTO-ENTMCNC: 33.7 G/DL (ref 30–36.5)
MCHC RBC AUTO-ENTMCNC: 33.9 G/DL (ref 31.5–35.7)
MCHC RBC AUTO-ENTMCNC: 34.3 G/DL (ref 30–36.5)
MCHC RBC AUTO-ENTMCNC: 34.4 G/DL (ref 30–36.5)
MCHC RBC AUTO-ENTMCNC: 34.4 G/DL (ref 31.5–35.7)
MCHC RBC AUTO-ENTMCNC: 34.7 G/DL (ref 30–36.5)
MCHC RBC AUTO-ENTMCNC: 35.1 G/DL (ref 30–36.5)
MCHC RBC AUTO-ENTMCNC: 35.1 G/DL (ref 30–36.5)
MCHC RBC AUTO-ENTMCNC: 35.2 G/DL (ref 30–36.5)
MCHC RBC AUTO-ENTMCNC: 35.3 G/DL (ref 30–36.5)
MCHC RBC AUTO-ENTMCNC: 35.4 G/DL (ref 30–36.5)
MCHC RBC AUTO-ENTMCNC: 35.5 G/DL (ref 30–36.5)
MCV RBC AUTO: 90.6 FL (ref 80–99)
MCV RBC AUTO: 90.7 FL (ref 80–99)
MCV RBC AUTO: 91.6 FL (ref 80–99)
MCV RBC AUTO: 91.6 FL (ref 80–99)
MCV RBC AUTO: 92.1 FL (ref 80–99)
MCV RBC AUTO: 92.4 FL (ref 80–99)
MCV RBC AUTO: 92.5 FL (ref 80–99)
MCV RBC AUTO: 92.6 FL (ref 80–99)
MCV RBC AUTO: 92.6 FL (ref 80–99)
MCV RBC AUTO: 93.5 FL (ref 80–99)
MCV RBC AUTO: 96 FL (ref 79–97)
MCV RBC AUTO: 97 FL (ref 79–97)
METAMYELOCYTES NFR BLD MANUAL: 1 %
METHADONE UR QL: NEGATIVE
MONOCYTES # BLD: 0 K/UL (ref 0–1)
MONOCYTES # BLD: 0.3 K/UL (ref 0–1)
MONOCYTES # BLD: 0.5 K/UL (ref 0–1)
MONOCYTES # BLD: 0.6 K/UL (ref 0–1)
MONOCYTES # BLD: 0.7 K/UL (ref 0–1)
MONOCYTES # BLD: 0.7 K/UL (ref 0–1)
MONOCYTES NFR BLD AUTO: 1 % (ref 5–13)
MONOCYTES NFR BLD AUTO: 10 % (ref 5–13)
MONOCYTES NFR BLD AUTO: 5 % (ref 5–13)
MONOCYTES NFR BLD AUTO: 8 % (ref 5–13)
MONOCYTES NFR BLD: 1 % (ref 5–13)
MONOCYTES NFR BLD: 1 % (ref 5–13)
MONOCYTES NFR BLD: 6 % (ref 5–13)
MONOCYTES NFR BLD: 8 % (ref 5–13)
NEUTS BAND NFR BLD MANUAL: 30 % (ref 0–6)
NEUTS BAND NFR BLD MANUAL: 6 % (ref 0–6)
NEUTS SEG # BLD: 1.5 K/UL (ref 1.8–8)
NEUTS SEG # BLD: 10.7 K/UL (ref 1.8–8)
NEUTS SEG # BLD: 3.8 K/UL (ref 1.8–8)
NEUTS SEG # BLD: 4.2 K/UL (ref 1.8–8)
NEUTS SEG # BLD: 4.3 K/UL (ref 1.8–8)
NEUTS SEG # BLD: 4.8 K/UL (ref 1.8–8)
NEUTS SEG # BLD: 4.9 K/UL (ref 1.8–8)
NEUTS SEG # BLD: 7.4 K/UL (ref 1.8–8)
NEUTS SEG NFR BLD AUTO: 63 % (ref 32–75)
NEUTS SEG NFR BLD AUTO: 68 % (ref 32–75)
NEUTS SEG NFR BLD AUTO: 72 % (ref 32–75)
NEUTS SEG NFR BLD AUTO: 80 % (ref 32–75)
NEUTS SEG NFR BLD: 76 % (ref 32–75)
NEUTS SEG NFR BLD: 80 % (ref 32–75)
NEUTS SEG NFR BLD: 93 % (ref 32–75)
NEUTS SEG NFR BLD: 97 % (ref 32–75)
NITRITE UR QL STRIP.AUTO: NEGATIVE
NITRITE UR QL STRIP.AUTO: NEGATIVE
OPIATES UR QL: NEGATIVE
P-R INTERVAL, ECG05: 130 MS
P-R INTERVAL, ECG05: 158 MS
P-R INTERVAL, ECG05: 166 MS
PCP UR QL: NEGATIVE
PH UR STRIP: 6 [PH] (ref 5–8)
PH UR STRIP: 6.5 [PH] (ref 5–8)
PHOSPHATE SERPL-MCNC: 2.7 MG/DL (ref 2.6–4.7)
PLATELET # BLD AUTO: 161 K/UL (ref 150–400)
PLATELET # BLD AUTO: 168 K/UL (ref 150–400)
PLATELET # BLD AUTO: 188 K/UL (ref 150–400)
PLATELET # BLD AUTO: 203 X10E3/UL (ref 150–379)
PLATELET # BLD AUTO: 207 K/UL (ref 150–400)
PLATELET # BLD AUTO: 231 K/UL (ref 150–400)
PLATELET # BLD AUTO: 233 X10E3/UL (ref 150–379)
PLATELET # BLD AUTO: 253 K/UL (ref 150–400)
PLATELET # BLD AUTO: 262 K/UL (ref 150–400)
PLATELET # BLD AUTO: 276 K/UL (ref 150–400)
PLATELET # BLD AUTO: 318 K/UL (ref 150–400)
PLATELET # BLD AUTO: 417 K/UL (ref 150–400)
PLATELET COMMENTS,PCOM: ABNORMAL
POTASSIUM BLD-SCNC: 3.9 MMOL/L (ref 3.5–5.1)
POTASSIUM SERPL-SCNC: 3.2 MMOL/L (ref 3.5–5.1)
POTASSIUM SERPL-SCNC: 3.6 MMOL/L (ref 3.5–5.1)
POTASSIUM SERPL-SCNC: 3.9 MMOL/L (ref 3.5–5.1)
POTASSIUM SERPL-SCNC: 4 MMOL/L (ref 3.5–5.1)
POTASSIUM SERPL-SCNC: 4 MMOL/L (ref 3.5–5.1)
POTASSIUM SERPL-SCNC: 4.1 MMOL/L (ref 3.5–5.1)
POTASSIUM SERPL-SCNC: 4.2 MMOL/L (ref 3.5–5.1)
POTASSIUM SERPL-SCNC: 4.2 MMOL/L (ref 3.5–5.1)
POTASSIUM SERPL-SCNC: 4.3 MMOL/L (ref 3.5–5.1)
POTASSIUM SERPL-SCNC: 4.3 MMOL/L (ref 3.5–5.1)
POTASSIUM SERPL-SCNC: 4.4 MMOL/L (ref 3.5–5.1)
POTASSIUM SERPL-SCNC: 4.4 MMOL/L (ref 3.5–5.2)
POTASSIUM SERPL-SCNC: 4.5 MMOL/L (ref 3.5–5.2)
POTASSIUM SERPL-SCNC: 4.6 MMOL/L (ref 3.5–5.1)
POTASSIUM SERPL-SCNC: 5.4 MMOL/L (ref 3.5–5.1)
POTASSIUM SERPL-SCNC: 5.7 MMOL/L (ref 3.5–5.1)
PROT SERPL-MCNC: 6.4 G/DL (ref 6.4–8.2)
PROT SERPL-MCNC: 6.4 G/DL (ref 6.4–8.2)
PROT SERPL-MCNC: 6.6 G/DL (ref 6.4–8.2)
PROT SERPL-MCNC: 6.8 G/DL (ref 6.4–8.2)
PROT SERPL-MCNC: 6.9 G/DL (ref 6–8.5)
PROT SERPL-MCNC: 6.9 G/DL (ref 6–8.5)
PROT UR STRIP-MCNC: NEGATIVE MG/DL
PROT UR STRIP-MCNC: NEGATIVE MG/DL
Q-T INTERVAL, ECG07: 290 MS
Q-T INTERVAL, ECG07: 402 MS
Q-T INTERVAL, ECG07: 408 MS
QRS DURATION, ECG06: 104 MS
QRS DURATION, ECG06: 88 MS
QRS DURATION, ECG06: 94 MS
QTC CALCULATION (BEZET), ECG08: 437 MS
QTC CALCULATION (BEZET), ECG08: 446 MS
QTC CALCULATION (BEZET), ECG08: 496 MS
RBC # BLD AUTO: 3 M/UL (ref 4.1–5.7)
RBC # BLD AUTO: 3.11 M/UL (ref 4.1–5.7)
RBC # BLD AUTO: 3.16 M/UL (ref 4.1–5.7)
RBC # BLD AUTO: 3.18 M/UL (ref 4.1–5.7)
RBC # BLD AUTO: 3.21 M/UL (ref 4.1–5.7)
RBC # BLD AUTO: 3.34 M/UL (ref 4.1–5.7)
RBC # BLD AUTO: 3.36 M/UL (ref 4.1–5.7)
RBC # BLD AUTO: 3.55 M/UL (ref 4.1–5.7)
RBC # BLD AUTO: 3.59 M/UL (ref 4.1–5.7)
RBC # BLD AUTO: 4.08 M/UL (ref 4.1–5.7)
RBC # BLD AUTO: 4.2 X10E6/UL (ref 4.14–5.8)
RBC # BLD AUTO: 4.22 X10E6/UL (ref 4.14–5.8)
RBC #/AREA URNS HPF: ABNORMAL /HPF (ref 0–5)
RBC MORPH BLD: ABNORMAL
SALICYLATES SERPL-MCNC: <1.7 MG/DL (ref 2.8–20)
SERVICE CMNT-IMP: ABNORMAL
SERVICE CMNT-IMP: NORMAL
SODIUM BLD-SCNC: 136 MMOL/L (ref 136–145)
SODIUM SERPL-SCNC: 131 MMOL/L (ref 136–145)
SODIUM SERPL-SCNC: 135 MMOL/L (ref 136–145)
SODIUM SERPL-SCNC: 136 MMOL/L (ref 136–145)
SODIUM SERPL-SCNC: 136 MMOL/L (ref 136–145)
SODIUM SERPL-SCNC: 137 MMOL/L (ref 134–144)
SODIUM SERPL-SCNC: 137 MMOL/L (ref 136–145)
SODIUM SERPL-SCNC: 139 MMOL/L (ref 136–145)
SODIUM SERPL-SCNC: 139 MMOL/L (ref 136–145)
SODIUM SERPL-SCNC: 140 MMOL/L (ref 134–144)
SODIUM SERPL-SCNC: 140 MMOL/L (ref 136–145)
SODIUM SERPL-SCNC: 141 MMOL/L (ref 136–145)
SP GR UR REFRACTOMETRY: 1.01 (ref 1–1.03)
SP GR UR REFRACTOMETRY: 1.02 (ref 1–1.03)
TROPONIN I SERPL-MCNC: <0.04 NG/ML
TSH SERPL DL<=0.005 MIU/L-ACNC: 2.2 UIU/ML (ref 0.45–4.5)
TSH SERPL DL<=0.05 MIU/L-ACNC: 0.54 UIU/ML (ref 0.36–3.74)
UROBILINOGEN UR QL STRIP.AUTO: 1 EU/DL (ref 0.2–1)
UROBILINOGEN UR QL STRIP.AUTO: 2 EU/DL (ref 0.2–1)
VENTRICULAR RATE, ECG03: 137 BPM
VENTRICULAR RATE, ECG03: 74 BPM
VENTRICULAR RATE, ECG03: 89 BPM
WBC # BLD AUTO: 1.8 K/UL (ref 4.1–11.1)
WBC # BLD AUTO: 11.5 K/UL (ref 4.1–11.1)
WBC # BLD AUTO: 14.9 K/UL (ref 4.1–11.1)
WBC # BLD AUTO: 16.4 K/UL (ref 4.1–11.1)
WBC # BLD AUTO: 3.9 K/UL (ref 4.1–11.1)
WBC # BLD AUTO: 4.6 K/UL (ref 4.1–11.1)
WBC # BLD AUTO: 5.4 K/UL (ref 4.1–11.1)
WBC # BLD AUTO: 6.9 K/UL (ref 4.1–11.1)
WBC # BLD AUTO: 6.9 X10E3/UL (ref 3.4–10.8)
WBC # BLD AUTO: 7 K/UL (ref 4.1–11.1)
WBC # BLD AUTO: 7.1 X10E3/UL (ref 3.4–10.8)
WBC # BLD AUTO: 9.2 K/UL (ref 4.1–11.1)
WBC URNS QL MICRO: ABNORMAL /HPF (ref 0–4)

## 2017-01-01 PROCEDURE — 84132 ASSAY OF SERUM POTASSIUM: CPT | Performed by: HOSPITALIST

## 2017-01-01 PROCEDURE — 0651 HSPC ROUTINE HOME CARE

## 2017-01-01 PROCEDURE — A9270 NON-COVERED ITEM OR SERVICE: HCPCS

## 2017-01-01 PROCEDURE — 88305 TISSUE EXAM BY PATHOLOGIST: CPT | Performed by: INTERNAL MEDICINE

## 2017-01-01 PROCEDURE — 78815 PET IMAGE W/CT SKULL-THIGH: CPT

## 2017-01-01 PROCEDURE — 80053 COMPREHEN METABOLIC PANEL: CPT | Performed by: STUDENT IN AN ORGANIZED HEALTH CARE EDUCATION/TRAINING PROGRAM

## 2017-01-01 PROCEDURE — G0299 HHS/HOSPICE OF RN EA 15 MIN: HCPCS

## 2017-01-01 PROCEDURE — 96375 TX/PRO/DX INJ NEW DRUG ADDON: CPT

## 2017-01-01 PROCEDURE — 3336500001 HSPC ELECTION

## 2017-01-01 PROCEDURE — 82962 GLUCOSE BLOOD TEST: CPT

## 2017-01-01 PROCEDURE — 0655 HSPC INPATIENT RESPITE

## 2017-01-01 PROCEDURE — 96417 CHEMO IV INFUS EACH ADDL SEQ: CPT

## 2017-01-01 PROCEDURE — 80048 BASIC METABOLIC PNL TOTAL CA: CPT | Performed by: HOSPITALIST

## 2017-01-01 PROCEDURE — 74011250636 HC RX REV CODE- 250/636: Performed by: FAMILY MEDICINE

## 2017-01-01 PROCEDURE — 74011250637 HC RX REV CODE- 250/637: Performed by: HOSPITALIST

## 2017-01-01 PROCEDURE — 93005 ELECTROCARDIOGRAM TRACING: CPT

## 2017-01-01 PROCEDURE — 92610 EVALUATE SWALLOWING FUNCTION: CPT | Performed by: SPEECH-LANGUAGE PATHOLOGIST

## 2017-01-01 PROCEDURE — 36415 COLL VENOUS BLD VENIPUNCTURE: CPT | Performed by: INTERNAL MEDICINE

## 2017-01-01 PROCEDURE — 36415 COLL VENOUS BLD VENIPUNCTURE: CPT | Performed by: NURSE PRACTITIONER

## 2017-01-01 PROCEDURE — HOSPICE MEDICATION HC HH HOSPICE MEDICATION

## 2017-01-01 PROCEDURE — 77030005123 HC CATH GASTMY PEG BSC -C: Performed by: SPECIALIST

## 2017-01-01 PROCEDURE — 36415 COLL VENOUS BLD VENIPUNCTURE: CPT | Performed by: STUDENT IN AN ORGANIZED HEALTH CARE EDUCATION/TRAINING PROGRAM

## 2017-01-01 PROCEDURE — 74011250637 HC RX REV CODE- 250/637: Performed by: NURSE PRACTITIONER

## 2017-01-01 PROCEDURE — C1769 GUIDE WIRE: HCPCS | Performed by: INTERNAL MEDICINE

## 2017-01-01 PROCEDURE — A6250 SKIN SEAL PROTECT MOISTURIZR: HCPCS

## 2017-01-01 PROCEDURE — 97116 GAIT TRAINING THERAPY: CPT

## 2017-01-01 PROCEDURE — 80307 DRUG TEST PRSMV CHEM ANLYZR: CPT | Performed by: STUDENT IN AN ORGANIZED HEALTH CARE EDUCATION/TRAINING PROGRAM

## 2017-01-01 PROCEDURE — 77386 HC IMRT TRMT DLVR COMPL: CPT

## 2017-01-01 PROCEDURE — 65270000029 HC RM PRIVATE

## 2017-01-01 PROCEDURE — 74011250637 HC RX REV CODE- 250/637: Performed by: INTERNAL MEDICINE

## 2017-01-01 PROCEDURE — 74011000250 HC RX REV CODE- 250: Performed by: INTERNAL MEDICINE

## 2017-01-01 PROCEDURE — 74011000258 HC RX REV CODE- 258: Performed by: NURSE PRACTITIONER

## 2017-01-01 PROCEDURE — G0156 HHCP-SVS OF AIDE,EA 15 MIN: HCPCS

## 2017-01-01 PROCEDURE — HHS10554 SHAMPOO/BODY WASH 8 OZ ALOE VESTA

## 2017-01-01 PROCEDURE — 36415 COLL VENOUS BLD VENIPUNCTURE: CPT | Performed by: FAMILY MEDICINE

## 2017-01-01 PROCEDURE — 36415 COLL VENOUS BLD VENIPUNCTURE: CPT | Performed by: HOSPITALIST

## 2017-01-01 PROCEDURE — 74011250636 HC RX REV CODE- 250/636: Performed by: NURSE PRACTITIONER

## 2017-01-01 PROCEDURE — 81003 URINALYSIS AUTO W/O SCOPE: CPT | Performed by: STUDENT IN AN ORGANIZED HEALTH CARE EDUCATION/TRAINING PROGRAM

## 2017-01-01 PROCEDURE — G0155 HHCP-SVS OF CSW,EA 15 MIN: HCPCS

## 2017-01-01 PROCEDURE — 74011636637 HC RX REV CODE- 636/637: Performed by: FAMILY MEDICINE

## 2017-01-01 PROCEDURE — T4541 LARGE DISPOSABLE UNDERPAD: HCPCS

## 2017-01-01 PROCEDURE — 85025 COMPLETE CBC W/AUTO DIFF WBC: CPT | Performed by: STUDENT IN AN ORGANIZED HEALTH CARE EDUCATION/TRAINING PROGRAM

## 2017-01-01 PROCEDURE — 85025 COMPLETE CBC W/AUTO DIFF WBC: CPT | Performed by: HOSPITALIST

## 2017-01-01 PROCEDURE — 96413 CHEMO IV INFUSION 1 HR: CPT

## 2017-01-01 PROCEDURE — 97166 OT EVAL MOD COMPLEX 45 MIN: CPT

## 2017-01-01 PROCEDURE — 74011250636 HC RX REV CODE- 250/636: Performed by: HOSPITALIST

## 2017-01-01 PROCEDURE — 80307 DRUG TEST PRSMV CHEM ANLYZR: CPT | Performed by: FAMILY MEDICINE

## 2017-01-01 PROCEDURE — 74011250636 HC RX REV CODE- 250/636

## 2017-01-01 PROCEDURE — 96360 HYDRATION IV INFUSION INIT: CPT

## 2017-01-01 PROCEDURE — 83735 ASSAY OF MAGNESIUM: CPT | Performed by: NURSE PRACTITIONER

## 2017-01-01 PROCEDURE — 74020 XR ABD FLAT/ ERECT: CPT

## 2017-01-01 PROCEDURE — 94761 N-INVAS EAR/PLS OXIMETRY MLT: CPT

## 2017-01-01 PROCEDURE — C1874 STENT, COATED/COV W/DEL SYS: HCPCS | Performed by: INTERNAL MEDICINE

## 2017-01-01 PROCEDURE — G8988 SELF CARE GOAL STATUS: HCPCS

## 2017-01-01 PROCEDURE — 85027 COMPLETE CBC AUTOMATED: CPT | Performed by: NURSE PRACTITIONER

## 2017-01-01 PROCEDURE — 77300 RADIATION THERAPY DOSE PLAN: CPT

## 2017-01-01 PROCEDURE — 76060000032 HC ANESTHESIA 0.5 TO 1 HR: Performed by: SPECIALIST

## 2017-01-01 PROCEDURE — 71010 XR CHEST PORT: CPT

## 2017-01-01 PROCEDURE — 77030031656 HC FCPS ENDO GRSP DISP BSC -B: Performed by: SPECIALIST

## 2017-01-01 PROCEDURE — 74011000250 HC RX REV CODE- 250

## 2017-01-01 PROCEDURE — T4526 ADULT SIZE PULL-ON MED: HCPCS

## 2017-01-01 PROCEDURE — 82140 ASSAY OF AMMONIA: CPT | Performed by: FAMILY MEDICINE

## 2017-01-01 PROCEDURE — 81001 URINALYSIS AUTO W/SCOPE: CPT | Performed by: STUDENT IN AN ORGANIZED HEALTH CARE EDUCATION/TRAINING PROGRAM

## 2017-01-01 PROCEDURE — 96361 HYDRATE IV INFUSION ADD-ON: CPT

## 2017-01-01 PROCEDURE — 85025 COMPLETE CBC W/AUTO DIFF WBC: CPT | Performed by: FAMILY MEDICINE

## 2017-01-01 PROCEDURE — 84484 ASSAY OF TROPONIN QUANT: CPT | Performed by: STUDENT IN AN ORGANIZED HEALTH CARE EDUCATION/TRAINING PROGRAM

## 2017-01-01 PROCEDURE — 80048 BASIC METABOLIC PNL TOTAL CA: CPT | Performed by: NURSE PRACTITIONER

## 2017-01-01 PROCEDURE — A6402 STERILE GAUZE <= 16 SQ IN: HCPCS

## 2017-01-01 PROCEDURE — 80076 HEPATIC FUNCTION PANEL: CPT | Performed by: INTERNAL MEDICINE

## 2017-01-01 PROCEDURE — 3E0G76Z INTRODUCTION OF NUTRITIONAL SUBSTANCE INTO UPPER GI, VIA NATURAL OR ARTIFICIAL OPENING: ICD-10-PCS | Performed by: SPECIALIST

## 2017-01-01 PROCEDURE — 65270000032 HC RM SEMIPRIVATE

## 2017-01-01 PROCEDURE — 77030032490 HC SLV COMPR SCD KNE COVD -B

## 2017-01-01 PROCEDURE — 80048 BASIC METABOLIC PNL TOTAL CA: CPT | Performed by: FAMILY MEDICINE

## 2017-01-01 PROCEDURE — 76040000019: Performed by: INTERNAL MEDICINE

## 2017-01-01 PROCEDURE — 77030012965 HC NDL HUBR BBMI -A

## 2017-01-01 PROCEDURE — 76060000031 HC ANESTHESIA FIRST 0.5 HR: Performed by: INTERNAL MEDICINE

## 2017-01-01 PROCEDURE — 70450 CT HEAD/BRAIN W/O DYE: CPT

## 2017-01-01 PROCEDURE — 85025 COMPLETE CBC W/AUTO DIFF WBC: CPT | Performed by: INTERNAL MEDICINE

## 2017-01-01 PROCEDURE — 92610 EVALUATE SWALLOWING FUNCTION: CPT

## 2017-01-01 PROCEDURE — 97530 THERAPEUTIC ACTIVITIES: CPT

## 2017-01-01 PROCEDURE — 85610 PROTHROMBIN TIME: CPT

## 2017-01-01 PROCEDURE — HHS10335 MOUTHWASH  FLAVOR ALCOHOL FREE 4 OZ

## 2017-01-01 PROCEDURE — 76040000007: Performed by: SPECIALIST

## 2017-01-01 PROCEDURE — G8998 SWALLOW D/C STATUS: HCPCS | Performed by: SPEECH-LANGUAGE PATHOLOGIST

## 2017-01-01 PROCEDURE — 74011250636 HC RX REV CODE- 250/636: Performed by: INTERNAL MEDICINE

## 2017-01-01 PROCEDURE — 80048 BASIC METABOLIC PNL TOTAL CA: CPT | Performed by: INTERNAL MEDICINE

## 2017-01-01 PROCEDURE — 74176 CT ABD & PELVIS W/O CONTRAST: CPT

## 2017-01-01 PROCEDURE — 0JH60WZ INSERTION OF TOTALLY IMPLANTABLE VASCULAR ACCESS DEVICE INTO CHEST SUBCUTANEOUS TISSUE AND FASCIA, OPEN APPROACH: ICD-10-PCS | Performed by: SURGERY

## 2017-01-01 PROCEDURE — 0DH63UZ INSERTION OF FEEDING DEVICE INTO STOMACH, PERCUTANEOUS APPROACH: ICD-10-PCS | Performed by: SPECIALIST

## 2017-01-01 PROCEDURE — A4320 IRRIGATION TRAY: HCPCS

## 2017-01-01 PROCEDURE — 74011000250 HC RX REV CODE- 250: Performed by: HOSPITALIST

## 2017-01-01 PROCEDURE — 83036 HEMOGLOBIN GLYCOSYLATED A1C: CPT | Performed by: FAMILY MEDICINE

## 2017-01-01 PROCEDURE — 74011250636 HC RX REV CODE- 250/636: Performed by: STUDENT IN AN ORGANIZED HEALTH CARE EDUCATION/TRAINING PROGRAM

## 2017-01-01 PROCEDURE — 77336 RADIATION PHYSICS CONSULT: CPT

## 2017-01-01 PROCEDURE — 02HV33Z INSERTION OF INFUSION DEVICE INTO SUPERIOR VENA CAVA, PERCUTANEOUS APPROACH: ICD-10-PCS | Performed by: SURGERY

## 2017-01-01 PROCEDURE — 97161 PT EVAL LOW COMPLEX 20 MIN: CPT

## 2017-01-01 PROCEDURE — 77010033678 HC OXYGEN DAILY

## 2017-01-01 PROCEDURE — 65660000000 HC RM CCU STEPDOWN

## 2017-01-01 PROCEDURE — 84443 ASSAY THYROID STIM HORMONE: CPT | Performed by: FAMILY MEDICINE

## 2017-01-01 PROCEDURE — 80053 COMPREHEN METABOLIC PANEL: CPT | Performed by: ANESTHESIOLOGY

## 2017-01-01 PROCEDURE — G0151 HHCP-SERV OF PT,EA 15 MIN: HCPCS

## 2017-01-01 PROCEDURE — 77301 RADIOTHERAPY DOSE PLAN IMRT: CPT

## 2017-01-01 PROCEDURE — 99285 EMERGENCY DEPT VISIT HI MDM: CPT

## 2017-01-01 PROCEDURE — 97535 SELF CARE MNGMENT TRAINING: CPT

## 2017-01-01 PROCEDURE — 400013 HH SOC

## 2017-01-01 PROCEDURE — 83735 ASSAY OF MAGNESIUM: CPT | Performed by: FAMILY MEDICINE

## 2017-01-01 PROCEDURE — 77030009426 HC FCPS BIOP ENDOSC BSC -B: Performed by: INTERNAL MEDICINE

## 2017-01-01 PROCEDURE — 36415 COLL VENOUS BLD VENIPUNCTURE: CPT | Performed by: ANESTHESIOLOGY

## 2017-01-01 PROCEDURE — 74020 XR ABD (AP AND ERECT OR DECUB): CPT

## 2017-01-01 PROCEDURE — 85025 COMPLETE CBC W/AUTO DIFF WBC: CPT | Performed by: ANESTHESIOLOGY

## 2017-01-01 PROCEDURE — 83605 ASSAY OF LACTIC ACID: CPT | Performed by: FAMILY MEDICINE

## 2017-01-01 PROCEDURE — 77030018798 HC PMP KT ENTRL FED COVD -A

## 2017-01-01 PROCEDURE — 84100 ASSAY OF PHOSPHORUS: CPT | Performed by: FAMILY MEDICINE

## 2017-01-01 PROCEDURE — G8987 SELF CARE CURRENT STATUS: HCPCS

## 2017-01-01 PROCEDURE — G8997 SWALLOW GOAL STATUS: HCPCS | Performed by: SPEECH-LANGUAGE PATHOLOGIST

## 2017-01-01 PROCEDURE — 74011250636 HC RX REV CODE- 250/636: Performed by: ANESTHESIOLOGY

## 2017-01-01 PROCEDURE — 0DC68ZZ EXTIRPATION OF MATTER FROM STOMACH, VIA NATURAL OR ARTIFICIAL OPENING ENDOSCOPIC: ICD-10-PCS | Performed by: SPECIALIST

## 2017-01-01 PROCEDURE — G8996 SWALLOW CURRENT STATUS: HCPCS | Performed by: SPEECH-LANGUAGE PATHOLOGIST

## 2017-01-01 PROCEDURE — A4927 NON-STERILE GLOVES: HCPCS

## 2017-01-01 PROCEDURE — 71250 CT THORAX DX C-: CPT

## 2017-01-01 PROCEDURE — 85025 COMPLETE CBC W/AUTO DIFF WBC: CPT | Performed by: NURSE PRACTITIONER

## 2017-01-01 DEVICE — STENT SYSTEM
Type: IMPLANTABLE DEVICE | Site: ESOPHAGUS | Status: FUNCTIONAL
Brand: WALLFLEX™ ESOPHAGEAL

## 2017-01-01 DEVICE — POWERPORT IMPLANTABLE PORT WITH ATTACHABLE 8F CHRONOFLEX OPEN-ENDED SINGLE-LUMEN VENOUS CATHETER INTERMEDIATE KIT (WITHOUT SUTURE PLUGS)
Type: IMPLANTABLE DEVICE | Site: NECK | Status: FUNCTIONAL
Brand: POWERPORT, CHRONOFLEX

## 2017-01-01 RX ORDER — FLUMAZENIL 0.1 MG/ML
0.2 INJECTION INTRAVENOUS
Status: ACTIVE | OUTPATIENT
Start: 2017-01-01 | End: 2017-01-01

## 2017-01-01 RX ORDER — TAMSULOSIN HYDROCHLORIDE 0.4 MG/1
0.8 CAPSULE ORAL DAILY
COMMUNITY

## 2017-01-01 RX ORDER — PROCHLORPERAZINE MALEATE 10 MG
TABLET ORAL
Refills: 3 | COMMUNITY
Start: 2017-01-01 | End: 2017-01-01

## 2017-01-01 RX ORDER — FENTANYL CITRATE 50 UG/ML
50 INJECTION, SOLUTION INTRAMUSCULAR; INTRAVENOUS AS NEEDED
Status: DISCONTINUED | OUTPATIENT
Start: 2017-01-01 | End: 2017-01-01 | Stop reason: HOSPADM

## 2017-01-01 RX ORDER — PROMETHAZINE HYDROCHLORIDE 25 MG/1
25 TABLET ORAL EVERY 6 HOURS
Status: DISCONTINUED | OUTPATIENT
Start: 2017-01-01 | End: 2017-01-01 | Stop reason: HOSPADM

## 2017-01-01 RX ORDER — HALOPERIDOL 5 MG/ML
2 INJECTION INTRAMUSCULAR
Status: DISCONTINUED | OUTPATIENT
Start: 2017-01-01 | End: 2017-01-01 | Stop reason: HOSPADM

## 2017-01-01 RX ORDER — ROPIVACAINE HYDROCHLORIDE 5 MG/ML
150 INJECTION, SOLUTION EPIDURAL; INFILTRATION; PERINEURAL AS NEEDED
Status: DISCONTINUED | OUTPATIENT
Start: 2017-01-01 | End: 2017-01-01 | Stop reason: HOSPADM

## 2017-01-01 RX ORDER — SUCRALFATE 1 G/10ML
1 SUSPENSION ORAL 4 TIMES DAILY
Qty: 414 ML | Refills: 2 | Status: SHIPPED | OUTPATIENT
Start: 2017-01-01 | End: 2017-01-01

## 2017-01-01 RX ORDER — SODIUM CHLORIDE 9 MG/ML
10 INJECTION INTRAMUSCULAR; INTRAVENOUS; SUBCUTANEOUS AS NEEDED
Status: ACTIVE | OUTPATIENT
Start: 2017-01-01 | End: 2017-01-01

## 2017-01-01 RX ORDER — MIDAZOLAM HYDROCHLORIDE 1 MG/ML
.25-1 INJECTION, SOLUTION INTRAMUSCULAR; INTRAVENOUS
Status: ACTIVE | OUTPATIENT
Start: 2017-01-01 | End: 2017-01-01

## 2017-01-01 RX ORDER — NYSTATIN 100000 [USP'U]/G
POWDER TOPICAL EVERY 12 HOURS
COMMUNITY
End: 2017-01-01

## 2017-01-01 RX ORDER — HALOPERIDOL 2 MG/ML
0.5 SOLUTION ORAL
Status: DISCONTINUED | OUTPATIENT
Start: 2017-01-01 | End: 2017-01-01 | Stop reason: HOSPADM

## 2017-01-01 RX ORDER — SODIUM CHLORIDE 9 MG/ML
1000 INJECTION, SOLUTION INTRAVENOUS ONCE
Status: COMPLETED | OUTPATIENT
Start: 2017-01-01 | End: 2017-01-01

## 2017-01-01 RX ORDER — SODIUM CHLORIDE 9 MG/ML
25 INJECTION, SOLUTION INTRAVENOUS CONTINUOUS
Status: DISCONTINUED | OUTPATIENT
Start: 2017-01-01 | End: 2017-01-01 | Stop reason: HOSPADM

## 2017-01-01 RX ORDER — HEPARIN 100 UNIT/ML
500 SYRINGE INTRAVENOUS AS NEEDED
Status: ACTIVE | OUTPATIENT
Start: 2017-01-01 | End: 2017-01-01

## 2017-01-01 RX ORDER — LISINOPRIL 5 MG/1
TABLET ORAL
Refills: 6 | COMMUNITY
Start: 2017-01-01 | End: 2017-01-01

## 2017-01-01 RX ORDER — ASPIRIN 81 MG/1
81 TABLET ORAL
COMMUNITY
End: 2017-01-01

## 2017-01-01 RX ORDER — ATROPINE SULFATE 0.1 MG/ML
0.5 INJECTION INTRAVENOUS
Status: CANCELLED | OUTPATIENT
Start: 2017-01-01 | End: 2017-01-01

## 2017-01-01 RX ORDER — ACETAMINOPHEN 10 MG/ML
1000 INJECTION, SOLUTION INTRAVENOUS ONCE
Status: COMPLETED | OUTPATIENT
Start: 2017-01-01 | End: 2017-01-01

## 2017-01-01 RX ORDER — TAMSULOSIN HYDROCHLORIDE 0.4 MG/1
0.4 CAPSULE ORAL 2 TIMES DAILY
Status: DISCONTINUED | OUTPATIENT
Start: 2017-01-01 | End: 2017-01-01

## 2017-01-01 RX ORDER — SENNOSIDES 8.8 MG/5ML
5 LIQUID ORAL
Status: DISCONTINUED | OUTPATIENT
Start: 2017-01-01 | End: 2017-01-01 | Stop reason: HOSPADM

## 2017-01-01 RX ORDER — SODIUM CHLORIDE 0.9 % (FLUSH) 0.9 %
5-10 SYRINGE (ML) INJECTION AS NEEDED
Status: DISCONTINUED | OUTPATIENT
Start: 2017-01-01 | End: 2017-01-01 | Stop reason: HOSPADM

## 2017-01-01 RX ORDER — NALOXONE HYDROCHLORIDE 0.4 MG/ML
0.4 INJECTION, SOLUTION INTRAMUSCULAR; INTRAVENOUS; SUBCUTANEOUS
Status: DISCONTINUED | OUTPATIENT
Start: 2017-01-01 | End: 2017-01-01 | Stop reason: HOSPADM

## 2017-01-01 RX ORDER — DIPHENHYDRAMINE HYDROCHLORIDE 50 MG/ML
25 INJECTION, SOLUTION INTRAMUSCULAR; INTRAVENOUS ONCE
Status: DISCONTINUED | OUTPATIENT
Start: 2017-01-01 | End: 2017-01-01 | Stop reason: HOSPADM

## 2017-01-01 RX ORDER — ONDANSETRON 2 MG/ML
4 INJECTION INTRAMUSCULAR; INTRAVENOUS ONCE
Status: COMPLETED | OUTPATIENT
Start: 2017-01-01 | End: 2017-01-01

## 2017-01-01 RX ORDER — SODIUM CHLORIDE 9 MG/ML
50 INJECTION, SOLUTION INTRAVENOUS CONTINUOUS
Status: DISPENSED | OUTPATIENT
Start: 2017-01-01 | End: 2017-01-01

## 2017-01-01 RX ORDER — EPINEPHRINE 0.1 MG/ML
1 INJECTION INTRACARDIAC; INTRAVENOUS
Status: DISCONTINUED | OUTPATIENT
Start: 2017-01-01 | End: 2017-01-01 | Stop reason: HOSPADM

## 2017-01-01 RX ORDER — CHOLECALCIFEROL (VITAMIN D3) 25 MCG
TABLET,CHEWABLE ORAL
Refills: 6 | COMMUNITY
Start: 2017-01-01 | End: 2017-01-01 | Stop reason: SDUPTHER

## 2017-01-01 RX ORDER — PROPOFOL 10 MG/ML
INJECTION, EMULSION INTRAVENOUS AS NEEDED
Status: DISCONTINUED | OUTPATIENT
Start: 2017-01-01 | End: 2017-01-01 | Stop reason: HOSPADM

## 2017-01-01 RX ORDER — ESMOLOL HYDROCHLORIDE 10 MG/ML
INJECTION INTRAVENOUS AS NEEDED
Status: DISCONTINUED | OUTPATIENT
Start: 2017-01-01 | End: 2017-01-01 | Stop reason: HOSPADM

## 2017-01-01 RX ORDER — PANTOPRAZOLE SODIUM 40 MG/1
40 TABLET, DELAYED RELEASE ORAL
Status: DISCONTINUED | OUTPATIENT
Start: 2017-01-01 | End: 2017-01-01 | Stop reason: HOSPADM

## 2017-01-01 RX ORDER — TRAMADOL HYDROCHLORIDE 50 MG/1
50 TABLET ORAL
COMMUNITY

## 2017-01-01 RX ORDER — DEXAMETHASONE 4 MG/1
TABLET ORAL
Qty: 30 TAB | Refills: 2 | Status: SHIPPED | OUTPATIENT
Start: 2017-01-01 | End: 2017-01-01

## 2017-01-01 RX ORDER — SCOLOPAMINE TRANSDERMAL SYSTEM 1 MG/1
1.5 PATCH, EXTENDED RELEASE TRANSDERMAL
Status: DISCONTINUED | OUTPATIENT
Start: 2017-01-01 | End: 2017-01-01 | Stop reason: HOSPADM

## 2017-01-01 RX ORDER — LANOLIN ALCOHOL/MO/W.PET/CERES
1000 CREAM (GRAM) TOPICAL DAILY
COMMUNITY
End: 2017-01-01 | Stop reason: SDUPTHER

## 2017-01-01 RX ORDER — SODIUM CHLORIDE 0.9 % (FLUSH) 0.9 %
5-10 SYRINGE (ML) INJECTION EVERY 8 HOURS
Status: DISCONTINUED | OUTPATIENT
Start: 2017-01-01 | End: 2017-01-01 | Stop reason: HOSPADM

## 2017-01-01 RX ORDER — CARVEDILOL 3.12 MG/1
3.12 TABLET ORAL 2 TIMES DAILY
COMMUNITY
End: 2017-01-01

## 2017-01-01 RX ORDER — EPINEPHRINE 0.1 MG/ML
1 INJECTION INTRACARDIAC; INTRAVENOUS
Status: DISCONTINUED | OUTPATIENT
Start: 2017-01-01 | End: 2017-01-01

## 2017-01-01 RX ORDER — HALOPERIDOL 5 MG/ML
2 INJECTION INTRAMUSCULAR ONCE
Status: ACTIVE | OUTPATIENT
Start: 2017-01-01 | End: 2017-01-01

## 2017-01-01 RX ORDER — FENTANYL CITRATE 50 UG/ML
100 INJECTION, SOLUTION INTRAMUSCULAR; INTRAVENOUS
Status: DISCONTINUED | OUTPATIENT
Start: 2017-01-01 | End: 2017-01-01 | Stop reason: HOSPADM

## 2017-01-01 RX ORDER — THERA TABS 400 MCG
1 TAB ORAL
COMMUNITY
End: 2017-01-01

## 2017-01-01 RX ORDER — LISINOPRIL 5 MG/1
TABLET ORAL
Qty: 30 TAB | Refills: 6 | Status: SHIPPED | OUTPATIENT
Start: 2017-01-01 | End: 2017-01-01

## 2017-01-01 RX ORDER — SODIUM CHLORIDE 9 MG/ML
50 INJECTION, SOLUTION INTRAVENOUS CONTINUOUS
Status: DISCONTINUED | OUTPATIENT
Start: 2017-01-01 | End: 2017-01-01 | Stop reason: HOSPADM

## 2017-01-01 RX ORDER — EPINEPHRINE 0.1 MG/ML
1 INJECTION INTRACARDIAC; INTRAVENOUS
Status: ACTIVE | OUTPATIENT
Start: 2017-01-01 | End: 2017-01-01

## 2017-01-01 RX ORDER — TAMSULOSIN HYDROCHLORIDE 0.4 MG/1
0.8 CAPSULE ORAL DAILY
Status: DISCONTINUED | OUTPATIENT
Start: 2017-01-01 | End: 2017-01-01 | Stop reason: HOSPADM

## 2017-01-01 RX ORDER — SUCRALFATE 1 G/10ML
SUSPENSION ORAL 4 TIMES DAILY
Status: ON HOLD | COMMUNITY
End: 2017-01-01

## 2017-01-01 RX ORDER — SODIUM CHLORIDE 0.9 % (FLUSH) 0.9 %
10-40 SYRINGE (ML) INJECTION AS NEEDED
Status: CANCELLED | OUTPATIENT
Start: 2017-01-01 | End: 2017-01-01

## 2017-01-01 RX ORDER — PALONOSETRON 0.05 MG/ML
0.25 INJECTION, SOLUTION INTRAVENOUS ONCE
Status: COMPLETED | OUTPATIENT
Start: 2017-01-01 | End: 2017-01-01

## 2017-01-01 RX ORDER — SODIUM CHLORIDE 9 MG/ML
50 INJECTION, SOLUTION INTRAVENOUS CONTINUOUS
Status: CANCELLED | OUTPATIENT
Start: 2017-01-01 | End: 2017-01-01

## 2017-01-01 RX ORDER — FINASTERIDE 5 MG/1
5 TABLET, FILM COATED ORAL DAILY
COMMUNITY

## 2017-01-01 RX ORDER — DEXTROMETHORPHAN/PSEUDOEPHED 2.5-7.5/.8
1.2 DROPS ORAL
Status: DISCONTINUED | OUTPATIENT
Start: 2017-01-01 | End: 2017-01-01 | Stop reason: SDUPTHER

## 2017-01-01 RX ORDER — SODIUM CHLORIDE, SODIUM LACTATE, POTASSIUM CHLORIDE, CALCIUM CHLORIDE 600; 310; 30; 20 MG/100ML; MG/100ML; MG/100ML; MG/100ML
1000 INJECTION, SOLUTION INTRAVENOUS CONTINUOUS
Status: DISCONTINUED | OUTPATIENT
Start: 2017-01-01 | End: 2017-01-01 | Stop reason: HOSPADM

## 2017-01-01 RX ORDER — MORPHINE SULFATE 2 MG/ML
2 INJECTION, SOLUTION INTRAMUSCULAR; INTRAVENOUS
Status: DISCONTINUED | OUTPATIENT
Start: 2017-01-01 | End: 2017-01-01 | Stop reason: HOSPADM

## 2017-01-01 RX ORDER — DEXTROSE 50 % IN WATER (D50W) INTRAVENOUS SYRINGE
12.5-25 AS NEEDED
Status: DISCONTINUED | OUTPATIENT
Start: 2017-01-01 | End: 2017-01-01 | Stop reason: HOSPADM

## 2017-01-01 RX ORDER — AMOXICILLIN AND CLAVULANATE POTASSIUM 875; 125 MG/1; MG/1
1 TABLET, FILM COATED ORAL EVERY 12 HOURS
Qty: 10 TAB | Refills: 0 | Status: SHIPPED | OUTPATIENT
Start: 2017-01-01 | End: 2017-01-01

## 2017-01-01 RX ORDER — FLUTICASONE PROPIONATE 50 MCG
1 SPRAY, SUSPENSION (ML) NASAL 2 TIMES DAILY
COMMUNITY

## 2017-01-01 RX ORDER — ONDANSETRON 2 MG/ML
4 INJECTION INTRAMUSCULAR; INTRAVENOUS
Status: DISCONTINUED | OUTPATIENT
Start: 2017-01-01 | End: 2017-01-01 | Stop reason: HOSPADM

## 2017-01-01 RX ORDER — FLUMAZENIL 0.1 MG/ML
0.2 INJECTION INTRAVENOUS
Status: DISCONTINUED | OUTPATIENT
Start: 2017-01-01 | End: 2017-01-01 | Stop reason: HOSPADM

## 2017-01-01 RX ORDER — MAGNESIUM SULFATE 100 %
4 CRYSTALS MISCELLANEOUS AS NEEDED
Status: DISCONTINUED | OUTPATIENT
Start: 2017-01-01 | End: 2017-01-01 | Stop reason: HOSPADM

## 2017-01-01 RX ORDER — DICLOFENAC SODIUM 10 MG/G
GEL TOPICAL EVERY 12 HOURS
COMMUNITY
End: 2017-01-01

## 2017-01-01 RX ORDER — PROMETHAZINE HYDROCHLORIDE 25 MG/1
12.5 TABLET ORAL
Status: DISCONTINUED | OUTPATIENT
Start: 2017-01-01 | End: 2017-01-01

## 2017-01-01 RX ORDER — ASPIRIN 81 MG/1
81 TABLET ORAL DAILY
COMMUNITY
End: 2017-01-01

## 2017-01-01 RX ORDER — QUETIAPINE FUMARATE 50 MG/1
50 TABLET, FILM COATED ORAL
Status: DISCONTINUED | OUTPATIENT
Start: 2017-01-01 | End: 2017-01-01

## 2017-01-01 RX ORDER — DEXTROMETHORPHAN/PSEUDOEPHED 2.5-7.5/.8
1.2 DROPS ORAL
Status: DISCONTINUED | OUTPATIENT
Start: 2017-01-01 | End: 2017-01-01 | Stop reason: HOSPADM

## 2017-01-01 RX ORDER — MIDAZOLAM HYDROCHLORIDE 1 MG/ML
0.5 INJECTION, SOLUTION INTRAMUSCULAR; INTRAVENOUS
Status: DISCONTINUED | OUTPATIENT
Start: 2017-01-01 | End: 2017-01-01 | Stop reason: HOSPADM

## 2017-01-01 RX ORDER — FINASTERIDE 5 MG/1
5 TABLET, FILM COATED ORAL DAILY
Status: DISCONTINUED | OUTPATIENT
Start: 2017-01-01 | End: 2017-01-01 | Stop reason: HOSPADM

## 2017-01-01 RX ORDER — MIDAZOLAM HYDROCHLORIDE 1 MG/ML
.25-1 INJECTION, SOLUTION INTRAMUSCULAR; INTRAVENOUS
Status: CANCELLED | OUTPATIENT
Start: 2017-01-01 | End: 2017-01-01

## 2017-01-01 RX ORDER — FENTANYL CITRATE 50 UG/ML
25 INJECTION, SOLUTION INTRAMUSCULAR; INTRAVENOUS
Status: DISCONTINUED | OUTPATIENT
Start: 2017-01-01 | End: 2017-01-01 | Stop reason: HOSPADM

## 2017-01-01 RX ORDER — RANITIDINE 150 MG/1
150 TABLET, FILM COATED ORAL
Qty: 20 TAB | Refills: 0 | Status: SHIPPED | OUTPATIENT
Start: 2017-01-01 | End: 2017-01-01

## 2017-01-01 RX ORDER — POLYETHYLENE GLYCOL 3350 17 G/17G
17 POWDER, FOR SOLUTION ORAL DAILY
Status: DISCONTINUED | OUTPATIENT
Start: 2017-01-01 | End: 2017-01-01

## 2017-01-01 RX ORDER — LIDOCAINE HYDROCHLORIDE 20 MG/ML
INJECTION, SOLUTION EPIDURAL; INFILTRATION; INTRACAUDAL; PERINEURAL AS NEEDED
Status: DISCONTINUED | OUTPATIENT
Start: 2017-01-01 | End: 2017-01-01 | Stop reason: HOSPADM

## 2017-01-01 RX ORDER — QUETIAPINE FUMARATE 25 MG/1
50 TABLET, FILM COATED ORAL
Status: DISCONTINUED | OUTPATIENT
Start: 2017-01-01 | End: 2017-01-01 | Stop reason: HOSPADM

## 2017-01-01 RX ORDER — FENTANYL CITRATE 50 UG/ML
INJECTION, SOLUTION INTRAMUSCULAR; INTRAVENOUS AS NEEDED
Status: DISCONTINUED | OUTPATIENT
Start: 2017-01-01 | End: 2017-01-01 | Stop reason: HOSPADM

## 2017-01-01 RX ORDER — FENTANYL CITRATE 50 UG/ML
200 INJECTION, SOLUTION INTRAMUSCULAR; INTRAVENOUS
Status: ACTIVE | OUTPATIENT
Start: 2017-01-01 | End: 2017-01-01

## 2017-01-01 RX ORDER — DIPHENHYDRAMINE HYDROCHLORIDE 50 MG/ML
25 INJECTION, SOLUTION INTRAMUSCULAR; INTRAVENOUS ONCE
Status: COMPLETED | OUTPATIENT
Start: 2017-01-01 | End: 2017-01-01

## 2017-01-01 RX ORDER — QUETIAPINE FUMARATE 50 MG/1
50 TABLET, FILM COATED ORAL
Qty: 30 TAB | Refills: 0 | Status: SHIPPED | OUTPATIENT
Start: 2017-01-01

## 2017-01-01 RX ORDER — SUCRALFATE 1 G/10ML
1 SUSPENSION ORAL
Qty: 500 ML | Refills: 0 | Status: SHIPPED
Start: 2017-01-01 | End: 2017-01-01 | Stop reason: SDUPTHER

## 2017-01-01 RX ORDER — PANTOPRAZOLE SODIUM 40 MG/1
40 TABLET, DELAYED RELEASE ORAL 2 TIMES DAILY
Status: DISCONTINUED | OUTPATIENT
Start: 2017-01-01 | End: 2017-01-01 | Stop reason: HOSPADM

## 2017-01-01 RX ORDER — PANTOPRAZOLE SODIUM 40 MG/1
40 GRANULE, DELAYED RELEASE ORAL
Status: DISCONTINUED | OUTPATIENT
Start: 2017-01-01 | End: 2017-01-01 | Stop reason: CLARIF

## 2017-01-01 RX ORDER — GABAPENTIN 800 MG/1
TABLET ORAL 3 TIMES DAILY
COMMUNITY
End: 2017-01-01

## 2017-01-01 RX ORDER — ATROPINE SULFATE 0.1 MG/ML
0.5 INJECTION INTRAVENOUS
Status: ACTIVE | OUTPATIENT
Start: 2017-01-01 | End: 2017-01-01

## 2017-01-01 RX ORDER — SODIUM CHLORIDE 9 MG/ML
25 INJECTION, SOLUTION INTRAVENOUS CONTINUOUS
Status: DISPENSED | OUTPATIENT
Start: 2017-01-01 | End: 2017-01-01

## 2017-01-01 RX ORDER — FLUTICASONE PROPIONATE 50 MCG
2 SPRAY, SUSPENSION (ML) NASAL 2 TIMES DAILY
COMMUNITY
End: 2017-01-01

## 2017-01-01 RX ORDER — CHLORPHENIRAMINE MALEATE 4 MG
TABLET ORAL
Refills: 1 | COMMUNITY
Start: 2017-01-01

## 2017-01-01 RX ORDER — ERGOCALCIFEROL 1.25 MG/1
CAPSULE ORAL
Qty: 4 CAP | Refills: 0 | Status: SHIPPED | OUTPATIENT
Start: 2017-01-01 | End: 2017-01-01 | Stop reason: ALTCHOICE

## 2017-01-01 RX ORDER — FUROSEMIDE 10 MG/ML
20 INJECTION INTRAMUSCULAR; INTRAVENOUS ONCE
Status: COMPLETED | OUTPATIENT
Start: 2017-01-01 | End: 2017-01-01

## 2017-01-01 RX ORDER — FINASTERIDE 5 MG/1
5 TABLET, FILM COATED ORAL DAILY
Qty: 30 TAB | Refills: 6 | Status: SHIPPED | OUTPATIENT
Start: 2017-01-01 | End: 2017-01-01

## 2017-01-01 RX ORDER — SUCRALFATE 1 G/10ML
1 SUSPENSION ORAL
Qty: 500 ML | Refills: 0 | Status: SHIPPED | OUTPATIENT
Start: 2017-01-01

## 2017-01-01 RX ORDER — SODIUM CHLORIDE 9 MG/ML
INJECTION, SOLUTION INTRAVENOUS
Status: DISCONTINUED | OUTPATIENT
Start: 2017-01-01 | End: 2017-01-01 | Stop reason: HOSPADM

## 2017-01-01 RX ORDER — TRAMADOL HYDROCHLORIDE 50 MG/1
50 TABLET ORAL
Qty: 120 TAB | Refills: 0 | Status: SHIPPED | OUTPATIENT
Start: 2017-01-01 | End: 2017-01-01

## 2017-01-01 RX ORDER — FEXOFENADINE HCL 60 MG
TABLET ORAL
COMMUNITY
End: 2017-01-01

## 2017-01-01 RX ORDER — MIDAZOLAM HYDROCHLORIDE 1 MG/ML
.25-1 INJECTION, SOLUTION INTRAMUSCULAR; INTRAVENOUS
Status: DISCONTINUED | OUTPATIENT
Start: 2017-01-01 | End: 2017-01-01 | Stop reason: HOSPADM

## 2017-01-01 RX ORDER — POTASSIUM CHLORIDE 20MEQ/15ML
40 LIQUID (ML) ORAL
Status: COMPLETED | OUTPATIENT
Start: 2017-01-01 | End: 2017-01-01

## 2017-01-01 RX ORDER — LIDOCAINE HYDROCHLORIDE 10 MG/ML
0.1 INJECTION, SOLUTION EPIDURAL; INFILTRATION; INTRACAUDAL; PERINEURAL AS NEEDED
Status: DISCONTINUED | OUTPATIENT
Start: 2017-01-01 | End: 2017-01-01 | Stop reason: HOSPADM

## 2017-01-01 RX ORDER — LISINOPRIL 10 MG/1
10 TABLET ORAL DAILY
COMMUNITY
End: 2017-01-01

## 2017-01-01 RX ORDER — CLONIDINE HYDROCHLORIDE 0.1 MG/1
0.1 TABLET ORAL
Status: DISCONTINUED | OUTPATIENT
Start: 2017-01-01 | End: 2017-01-01

## 2017-01-01 RX ORDER — GABAPENTIN 800 MG/1
TABLET ORAL
Qty: 90 TAB | Refills: 4 | Status: SHIPPED | OUTPATIENT
Start: 2017-01-01 | End: 2017-01-01

## 2017-01-01 RX ORDER — AMOXICILLIN AND CLAVULANATE POTASSIUM 875; 125 MG/1; MG/1
1 TABLET, FILM COATED ORAL EVERY 12 HOURS
Status: DISCONTINUED | OUTPATIENT
Start: 2017-01-01 | End: 2017-01-01 | Stop reason: HOSPADM

## 2017-01-01 RX ORDER — INSULIN LISPRO 100 [IU]/ML
INJECTION, SOLUTION INTRAVENOUS; SUBCUTANEOUS
Status: DISCONTINUED | OUTPATIENT
Start: 2017-01-01 | End: 2017-01-01 | Stop reason: HOSPADM

## 2017-01-01 RX ORDER — CLONIDINE HYDROCHLORIDE 0.1 MG/1
0.1 TABLET ORAL
Status: DISCONTINUED | OUTPATIENT
Start: 2017-01-01 | End: 2017-01-01 | Stop reason: HOSPADM

## 2017-01-01 RX ORDER — MORPHINE SULFATE 20 MG/ML
10 SOLUTION ORAL
Status: DISCONTINUED | OUTPATIENT
Start: 2017-01-01 | End: 2017-01-01 | Stop reason: HOSPADM

## 2017-01-01 RX ORDER — DEXTROMETHORPHAN/PSEUDOEPHED 2.5-7.5/.8
1.2 DROPS ORAL
Status: CANCELLED | OUTPATIENT
Start: 2017-01-01

## 2017-01-01 RX ORDER — DIPHENHYDRAMINE HYDROCHLORIDE 50 MG/ML
25 INJECTION, SOLUTION INTRAMUSCULAR; INTRAVENOUS ONCE
Status: DISCONTINUED | OUTPATIENT
Start: 2017-01-01 | End: 2017-01-01

## 2017-01-01 RX ORDER — ONDANSETRON 2 MG/ML
4 INJECTION INTRAMUSCULAR; INTRAVENOUS AS NEEDED
Status: DISCONTINUED | OUTPATIENT
Start: 2017-01-01 | End: 2017-01-01 | Stop reason: HOSPADM

## 2017-01-01 RX ORDER — NALOXONE HYDROCHLORIDE 0.4 MG/ML
0.4 INJECTION, SOLUTION INTRAMUSCULAR; INTRAVENOUS; SUBCUTANEOUS
Status: ACTIVE | OUTPATIENT
Start: 2017-01-01 | End: 2017-01-01

## 2017-01-01 RX ORDER — CEFAZOLIN SODIUM 1 G/3ML
INJECTION, POWDER, FOR SOLUTION INTRAMUSCULAR; INTRAVENOUS AS NEEDED
Status: DISCONTINUED | OUTPATIENT
Start: 2017-01-01 | End: 2017-01-01 | Stop reason: HOSPADM

## 2017-01-01 RX ORDER — LANOLIN ALCOHOL/MO/W.PET/CERES
1000 CREAM (GRAM) TOPICAL DAILY
Qty: 30 TAB | Refills: 6 | Status: SHIPPED | OUTPATIENT
Start: 2017-01-01 | End: 2017-01-01

## 2017-01-01 RX ORDER — HEPARIN 100 UNIT/ML
500 SYRINGE INTRAVENOUS AS NEEDED
Status: CANCELLED | OUTPATIENT
Start: 2017-01-01 | End: 2017-01-01

## 2017-01-01 RX ORDER — SODIUM CHLORIDE 0.9 % (FLUSH) 0.9 %
5-10 SYRINGE (ML) INJECTION AS NEEDED
Status: ACTIVE | OUTPATIENT
Start: 2017-01-01 | End: 2017-01-01

## 2017-01-01 RX ORDER — HEPARIN 100 UNIT/ML
300 SYRINGE INTRAVENOUS ONCE
Status: COMPLETED | OUTPATIENT
Start: 2017-01-01 | End: 2017-01-01

## 2017-01-01 RX ORDER — CHOLECALCIFEROL TAB 125 MCG (5000 UNIT) 125 MCG
5000 TAB ORAL DAILY
COMMUNITY
End: 2017-01-01

## 2017-01-01 RX ORDER — BUPIVACAINE HYDROCHLORIDE AND EPINEPHRINE 2.5; 5 MG/ML; UG/ML
30 INJECTION, SOLUTION EPIDURAL; INFILTRATION; INTRACAUDAL; PERINEURAL ONCE
Status: COMPLETED | OUTPATIENT
Start: 2017-01-01 | End: 2017-01-01

## 2017-01-01 RX ORDER — PANTOPRAZOLE SODIUM 40 MG/1
40 TABLET, DELAYED RELEASE ORAL DAILY
Qty: 30 TAB | Refills: 0 | Status: SHIPPED | OUTPATIENT
Start: 2017-01-01

## 2017-01-01 RX ORDER — LISINOPRIL 10 MG/1
5 TABLET ORAL DAILY
Status: DISCONTINUED | OUTPATIENT
Start: 2017-01-01 | End: 2017-01-01 | Stop reason: HOSPADM

## 2017-01-01 RX ORDER — ATORVASTATIN CALCIUM 10 MG/1
10 TABLET, FILM COATED ORAL DAILY
COMMUNITY
End: 2017-01-01

## 2017-01-01 RX ORDER — SODIUM CHLORIDE, SODIUM LACTATE, POTASSIUM CHLORIDE, CALCIUM CHLORIDE 600; 310; 30; 20 MG/100ML; MG/100ML; MG/100ML; MG/100ML
100 INJECTION, SOLUTION INTRAVENOUS CONTINUOUS
Status: DISCONTINUED | OUTPATIENT
Start: 2017-01-01 | End: 2017-01-01 | Stop reason: HOSPADM

## 2017-01-01 RX ORDER — ATROPINE SULFATE 0.1 MG/ML
0.5 INJECTION INTRAVENOUS
Status: DISCONTINUED | OUTPATIENT
Start: 2017-01-01 | End: 2017-01-01 | Stop reason: HOSPADM

## 2017-01-01 RX ORDER — SODIUM CHLORIDE 0.9 % (FLUSH) 0.9 %
5-10 SYRINGE (ML) INJECTION AS NEEDED
Status: CANCELLED | OUTPATIENT
Start: 2017-01-01 | End: 2017-01-01

## 2017-01-01 RX ORDER — SODIUM CHLORIDE 9 MG/ML
75 INJECTION, SOLUTION INTRAVENOUS CONTINUOUS
Status: DISCONTINUED | OUTPATIENT
Start: 2017-01-01 | End: 2017-01-01

## 2017-01-01 RX ORDER — FLUMAZENIL 0.1 MG/ML
0.2 INJECTION INTRAVENOUS
Status: CANCELLED | OUTPATIENT
Start: 2017-01-01 | End: 2017-01-01

## 2017-01-01 RX ORDER — SODIUM CHLORIDE, SODIUM LACTATE, POTASSIUM CHLORIDE, CALCIUM CHLORIDE 600; 310; 30; 20 MG/100ML; MG/100ML; MG/100ML; MG/100ML
INJECTION, SOLUTION INTRAVENOUS
Status: DISCONTINUED | OUTPATIENT
Start: 2017-01-01 | End: 2017-01-01 | Stop reason: HOSPADM

## 2017-01-01 RX ORDER — PANTOPRAZOLE SODIUM 40 MG/1
40 TABLET, DELAYED RELEASE ORAL DAILY
Qty: 60 TAB | Refills: 0 | Status: SHIPPED | OUTPATIENT
Start: 2017-01-01 | End: 2017-01-01 | Stop reason: SDUPTHER

## 2017-01-01 RX ORDER — DEXAMETHASONE SODIUM PHOSPHATE 4 MG/ML
12 INJECTION, SOLUTION INTRA-ARTICULAR; INTRALESIONAL; INTRAMUSCULAR; INTRAVENOUS; SOFT TISSUE ONCE
Status: COMPLETED | OUTPATIENT
Start: 2017-01-01 | End: 2017-01-01

## 2017-01-01 RX ORDER — SODIUM CHLORIDE 0.9 % (FLUSH) 0.9 %
5-10 SYRINGE (ML) INJECTION EVERY 8 HOURS
Status: ACTIVE | OUTPATIENT
Start: 2017-01-01 | End: 2017-01-01

## 2017-01-01 RX ORDER — MULTIVITAMIN
TABLET ORAL
Refills: 11 | COMMUNITY
Start: 2017-01-01 | End: 2017-01-01 | Stop reason: SDUPTHER

## 2017-01-01 RX ORDER — MIDAZOLAM HYDROCHLORIDE 1 MG/ML
1 INJECTION, SOLUTION INTRAMUSCULAR; INTRAVENOUS AS NEEDED
Status: DISCONTINUED | OUTPATIENT
Start: 2017-01-01 | End: 2017-01-01 | Stop reason: HOSPADM

## 2017-01-01 RX ORDER — ASPIRIN 81 MG/1
81 TABLET ORAL DAILY
Status: DISCONTINUED | OUTPATIENT
Start: 2017-01-01 | End: 2017-01-01 | Stop reason: HOSPADM

## 2017-01-01 RX ORDER — PALONOSETRON 0.05 MG/ML
0.25 INJECTION, SOLUTION INTRAVENOUS ONCE
Status: DISCONTINUED | OUTPATIENT
Start: 2017-01-01 | End: 2017-01-01 | Stop reason: HOSPADM

## 2017-01-01 RX ORDER — QUETIAPINE FUMARATE 25 MG/1
50 TABLET, FILM COATED ORAL
Status: DISCONTINUED | OUTPATIENT
Start: 2017-01-01 | End: 2017-01-01 | Stop reason: CLARIF

## 2017-01-01 RX ORDER — PALONOSETRON 0.05 MG/ML
0.25 INJECTION, SOLUTION INTRAVENOUS ONCE
Status: DISCONTINUED | OUTPATIENT
Start: 2017-01-01 | End: 2017-01-01

## 2017-01-01 RX ORDER — MULTIVITAMIN
1 TABLET ORAL 2 TIMES DAILY
Qty: 60 TAB | Refills: 11 | Status: SHIPPED | OUTPATIENT
Start: 2017-01-01 | End: 2017-01-01

## 2017-01-01 RX ORDER — FINASTERIDE 5 MG/1
5 TABLET, FILM COATED ORAL DAILY
Status: DISCONTINUED | OUTPATIENT
Start: 2017-01-01 | End: 2017-01-01

## 2017-01-01 RX ORDER — PANTOPRAZOLE SODIUM 40 MG/1
40 TABLET, DELAYED RELEASE ORAL
Status: DISCONTINUED | OUTPATIENT
Start: 2017-01-01 | End: 2017-01-01

## 2017-01-01 RX ORDER — CHLORPHENIRAMINE MALEATE 4 MG
TABLET ORAL
Qty: 60 TAB | Refills: 1 | Status: SHIPPED | OUTPATIENT
Start: 2017-01-01 | End: 2017-01-01

## 2017-01-01 RX ORDER — PROMETHAZINE HYDROCHLORIDE 25 MG/1
25 TABLET ORAL
Status: DISCONTINUED | OUTPATIENT
Start: 2017-01-01 | End: 2017-01-01

## 2017-01-01 RX ORDER — CEFAZOLIN SODIUM IN 0.9 % NACL 2 G/50 ML
2 INTRAVENOUS SOLUTION, PIGGYBACK (ML) INTRAVENOUS ONCE
Status: COMPLETED | OUTPATIENT
Start: 2017-01-01 | End: 2017-01-01

## 2017-01-01 RX ORDER — PANTOPRAZOLE SODIUM 40 MG/1
40 TABLET, DELAYED RELEASE ORAL 2 TIMES DAILY
Status: ON HOLD | COMMUNITY
End: 2017-01-01

## 2017-01-01 RX ORDER — BUMETANIDE 1 MG/1
1 TABLET ORAL
COMMUNITY
End: 2017-01-01

## 2017-01-01 RX ORDER — HYDROMORPHONE HYDROCHLORIDE 1 MG/ML
0.2 INJECTION, SOLUTION INTRAMUSCULAR; INTRAVENOUS; SUBCUTANEOUS
Status: ACTIVE | OUTPATIENT
Start: 2017-01-01 | End: 2017-01-01

## 2017-01-01 RX ORDER — GABAPENTIN 400 MG/1
800 CAPSULE ORAL 3 TIMES DAILY
Status: DISCONTINUED | OUTPATIENT
Start: 2017-01-01 | End: 2017-01-01 | Stop reason: HOSPADM

## 2017-01-01 RX ORDER — PANTOPRAZOLE SODIUM 40 MG/1
40 TABLET, DELAYED RELEASE ORAL DAILY
Qty: 60 TAB | Refills: 0 | Status: SHIPPED | OUTPATIENT
Start: 2017-01-01 | End: 2017-01-01

## 2017-01-01 RX ORDER — DIPHENHYDRAMINE HYDROCHLORIDE 50 MG/ML
12.5 INJECTION, SOLUTION INTRAMUSCULAR; INTRAVENOUS AS NEEDED
Status: DISCONTINUED | OUTPATIENT
Start: 2017-01-01 | End: 2017-01-01 | Stop reason: HOSPADM

## 2017-01-01 RX ORDER — CHLORPHENIRAMINE MALEATE 4 MG
TABLET ORAL
Qty: 60 TAB | Refills: 1 | Status: SHIPPED | OUTPATIENT
Start: 2017-01-01 | End: 2017-01-01 | Stop reason: SDUPTHER

## 2017-01-01 RX ORDER — SODIUM CHLORIDE 0.9 % (FLUSH) 0.9 %
5-10 SYRINGE (ML) INJECTION EVERY 8 HOURS
Status: CANCELLED | OUTPATIENT
Start: 2017-01-01 | End: 2017-01-01

## 2017-01-01 RX ORDER — HALOPERIDOL 5 MG/ML
1 INJECTION INTRAMUSCULAR
Status: DISCONTINUED | OUTPATIENT
Start: 2017-01-01 | End: 2017-01-01 | Stop reason: HOSPADM

## 2017-01-01 RX ORDER — MIDAZOLAM HYDROCHLORIDE 1 MG/ML
.25-1 INJECTION, SOLUTION INTRAMUSCULAR; INTRAVENOUS
Status: DISCONTINUED | OUTPATIENT
Start: 2017-01-01 | End: 2017-01-01 | Stop reason: SDUPTHER

## 2017-01-01 RX ORDER — DEXAMETHASONE SODIUM PHOSPHATE 4 MG/ML
12 INJECTION, SOLUTION INTRA-ARTICULAR; INTRALESIONAL; INTRAMUSCULAR; INTRAVENOUS; SOFT TISSUE ONCE
Status: DISCONTINUED | OUTPATIENT
Start: 2017-01-01 | End: 2017-01-01 | Stop reason: HOSPADM

## 2017-01-01 RX ORDER — SODIUM CHLORIDE 0.9 % (FLUSH) 0.9 %
10-40 SYRINGE (ML) INJECTION AS NEEDED
Status: ACTIVE | OUTPATIENT
Start: 2017-01-01 | End: 2017-01-01

## 2017-01-01 RX ORDER — CHOLECALCIFEROL (VITAMIN D3) 25 MCG
TABLET,CHEWABLE ORAL
Refills: 6 | COMMUNITY
Start: 2017-01-01 | End: 2017-01-01

## 2017-01-01 RX ORDER — SODIUM CHLORIDE 9 MG/ML
10 INJECTION INTRAMUSCULAR; INTRAVENOUS; SUBCUTANEOUS AS NEEDED
Status: CANCELLED | OUTPATIENT
Start: 2017-01-01 | End: 2017-01-01

## 2017-01-01 RX ORDER — SUCRALFATE 1 G/10ML
1 SUSPENSION ORAL 4 TIMES DAILY
Status: DISCONTINUED | OUTPATIENT
Start: 2017-01-01 | End: 2017-01-01 | Stop reason: HOSPADM

## 2017-01-01 RX ORDER — TRAMADOL HYDROCHLORIDE 50 MG/1
50 TABLET ORAL
Status: DISCONTINUED | OUTPATIENT
Start: 2017-01-01 | End: 2017-01-01 | Stop reason: HOSPADM

## 2017-01-01 RX ORDER — RANITIDINE 150 MG/1
150 TABLET, FILM COATED ORAL 2 TIMES DAILY
Status: ON HOLD | COMMUNITY
End: 2017-01-01

## 2017-01-01 RX ORDER — LORAZEPAM 0.5 MG/1
0.5 TABLET ORAL
Qty: 30 TAB | Refills: 1 | Status: SHIPPED | OUTPATIENT
Start: 2017-01-01 | End: 2017-01-01

## 2017-01-01 RX ORDER — SODIUM CHLORIDE 9 MG/ML
125 INJECTION, SOLUTION INTRAVENOUS CONTINUOUS
Status: DISCONTINUED | OUTPATIENT
Start: 2017-01-01 | End: 2017-01-01

## 2017-01-01 RX ORDER — QUETIAPINE FUMARATE 25 MG/1
25 TABLET, FILM COATED ORAL
Status: DISCONTINUED | OUTPATIENT
Start: 2017-01-01 | End: 2017-01-01

## 2017-01-01 RX ORDER — ATROPINE SULFATE 0.1 MG/ML
0.5 INJECTION INTRAVENOUS
Status: DISCONTINUED | OUTPATIENT
Start: 2017-01-01 | End: 2017-01-01 | Stop reason: SDUPTHER

## 2017-01-01 RX ORDER — PAROXETINE HYDROCHLORIDE 20 MG/1
20 TABLET, FILM COATED ORAL DAILY
COMMUNITY
End: 2017-01-01

## 2017-01-01 RX ORDER — LANOLIN ALCOHOL/MO/W.PET/CERES
CREAM (GRAM) TOPICAL
Qty: 30 TAB | Refills: 6 | Status: ON HOLD | OUTPATIENT
Start: 2017-01-01 | End: 2017-01-01

## 2017-01-01 RX ORDER — SODIUM CHLORIDE 0.9 % (FLUSH) 0.9 %
10 SYRINGE (ML) INJECTION
Status: COMPLETED | OUTPATIENT
Start: 2017-01-01 | End: 2017-01-01

## 2017-01-01 RX ORDER — SODIUM CHLORIDE 9 MG/ML
25 INJECTION, SOLUTION INTRAVENOUS CONTINUOUS
Status: DISCONTINUED | OUTPATIENT
Start: 2017-01-01 | End: 2017-01-01

## 2017-01-01 RX ORDER — EPINEPHRINE 0.1 MG/ML
1 INJECTION INTRACARDIAC; INTRAVENOUS
Status: CANCELLED | OUTPATIENT
Start: 2017-01-01 | End: 2017-01-01

## 2017-01-01 RX ORDER — HYDROCORTISONE 25 MG/G
CREAM TOPICAL
Refills: 6 | COMMUNITY
Start: 2017-01-01 | End: 2017-01-01

## 2017-01-01 RX ORDER — AMOXICILLIN AND CLAVULANATE POTASSIUM 875; 125 MG/1; MG/1
1 TABLET, FILM COATED ORAL EVERY 12 HOURS
Qty: 10 TAB | Refills: 0 | Status: SHIPPED
Start: 2017-01-01 | End: 2017-01-01

## 2017-01-01 RX ORDER — LISINOPRIL 10 MG/1
5 TABLET ORAL DAILY
Status: DISCONTINUED | OUTPATIENT
Start: 2017-01-01 | End: 2017-01-01

## 2017-01-01 RX ORDER — ONDANSETRON HYDROCHLORIDE 8 MG/1
8 TABLET, FILM COATED ORAL
Qty: 30 TAB | Refills: 5 | Status: SHIPPED | OUTPATIENT
Start: 2017-01-01

## 2017-01-01 RX ORDER — OXYCODONE HYDROCHLORIDE 5 MG/1
5 TABLET ORAL AS NEEDED
Status: DISCONTINUED | OUTPATIENT
Start: 2017-01-01 | End: 2017-01-01 | Stop reason: HOSPADM

## 2017-01-01 RX ORDER — TAMSULOSIN HYDROCHLORIDE 0.4 MG/1
0.8 CAPSULE ORAL DAILY
Status: DISCONTINUED | OUTPATIENT
Start: 2017-01-01 | End: 2017-01-01

## 2017-01-01 RX ORDER — POLYETHYLENE GLYCOL 3350 17 G/17G
POWDER, FOR SOLUTION ORAL
Refills: 2 | Status: ON HOLD | COMMUNITY
Start: 2017-01-01 | End: 2017-01-01

## 2017-01-01 RX ORDER — HALOPERIDOL 2 MG/ML
2 SOLUTION ORAL
Status: DISCONTINUED | OUTPATIENT
Start: 2017-01-01 | End: 2017-01-01 | Stop reason: HOSPADM

## 2017-01-01 RX ORDER — LORATADINE 10 MG/1
10 TABLET ORAL DAILY
Qty: 30 TAB | Refills: 4 | Status: ON HOLD | OUTPATIENT
Start: 2017-01-01 | End: 2017-01-01

## 2017-01-01 RX ORDER — LORATADINE 10 MG/1
10 TABLET ORAL DAILY
COMMUNITY
End: 2017-01-01

## 2017-01-01 RX ORDER — FENTANYL CITRATE 50 UG/ML
200 INJECTION, SOLUTION INTRAMUSCULAR; INTRAVENOUS
Status: CANCELLED | OUTPATIENT
Start: 2017-01-01 | End: 2017-01-01

## 2017-01-01 RX ORDER — NALOXONE HYDROCHLORIDE 0.4 MG/ML
0.4 INJECTION, SOLUTION INTRAMUSCULAR; INTRAVENOUS; SUBCUTANEOUS
Status: CANCELLED | OUTPATIENT
Start: 2017-01-01 | End: 2017-01-01

## 2017-01-01 RX ORDER — LISINOPRIL 10 MG/1
5 TABLET ORAL DAILY
COMMUNITY
End: 2017-01-01 | Stop reason: SDUPTHER

## 2017-01-01 RX ORDER — SUCRALFATE 1 G/10ML
1 SUSPENSION ORAL
Status: DISCONTINUED | OUTPATIENT
Start: 2017-01-01 | End: 2017-01-01 | Stop reason: HOSPADM

## 2017-01-01 RX ORDER — POLYETHYLENE GLYCOL 3350 17 G/17G
17 POWDER, FOR SOLUTION ORAL DAILY
COMMUNITY
End: 2017-01-01

## 2017-01-01 RX ORDER — DEXAMETHASONE SODIUM PHOSPHATE 4 MG/ML
12 INJECTION, SOLUTION INTRA-ARTICULAR; INTRALESIONAL; INTRAMUSCULAR; INTRAVENOUS; SOFT TISSUE ONCE
Status: DISCONTINUED | OUTPATIENT
Start: 2017-01-01 | End: 2017-01-01

## 2017-01-01 RX ORDER — HYDROCORTISONE 25 MG/G
CREAM TOPICAL 4 TIMES DAILY
Qty: 30 G | Refills: 6 | Status: ON HOLD | OUTPATIENT
Start: 2017-01-01 | End: 2017-01-01

## 2017-01-01 RX ADMIN — PACLITAXEL 100 MG: 6 INJECTION, SOLUTION INTRAVENOUS at 15:16

## 2017-01-01 RX ADMIN — PROPOFOL 30 MG: 10 INJECTION, EMULSION INTRAVENOUS at 12:54

## 2017-01-01 RX ADMIN — FENTANYL CITRATE 25 MCG: 50 INJECTION, SOLUTION INTRAMUSCULAR; INTRAVENOUS at 20:58

## 2017-01-01 RX ADMIN — ASPIRIN 81 MG: 81 TABLET, COATED ORAL at 09:52

## 2017-01-01 RX ADMIN — ONDANSETRON 4 MG: 2 INJECTION INTRAMUSCULAR; INTRAVENOUS at 09:01

## 2017-01-01 RX ADMIN — FINASTERIDE 5 MG: 5 TABLET, FILM COATED ORAL at 09:05

## 2017-01-01 RX ADMIN — ONDANSETRON 4 MG: 2 INJECTION INTRAMUSCULAR; INTRAVENOUS at 15:39

## 2017-01-01 RX ADMIN — ONDANSETRON 4 MG: 2 INJECTION INTRAMUSCULAR; INTRAVENOUS at 17:25

## 2017-01-01 RX ADMIN — TAMSULOSIN HYDROCHLORIDE 0.8 MG: 0.4 CAPSULE ORAL at 09:57

## 2017-01-01 RX ADMIN — SUCRALFATE 1 G: 1 SUSPENSION ORAL at 12:20

## 2017-01-01 RX ADMIN — PROPOFOL 25 MG: 10 INJECTION, EMULSION INTRAVENOUS at 13:35

## 2017-01-01 RX ADMIN — PROPOFOL 50 MG: 10 INJECTION, EMULSION INTRAVENOUS at 16:24

## 2017-01-01 RX ADMIN — PANTOPRAZOLE SODIUM 40 MG: 40 TABLET, DELAYED RELEASE ORAL at 08:58

## 2017-01-01 RX ADMIN — SUCRALFATE 1 G: 1 SUSPENSION ORAL at 12:00

## 2017-01-01 RX ADMIN — DEXAMETHASONE SODIUM PHOSPHATE 12 MG: 4 INJECTION, SOLUTION INTRA-ARTICULAR; INTRALESIONAL; INTRAMUSCULAR; INTRAVENOUS; SOFT TISSUE at 13:10

## 2017-01-01 RX ADMIN — Medication 10 ML: at 21:47

## 2017-01-01 RX ADMIN — TAMSULOSIN HYDROCHLORIDE 0.8 MG: 0.4 CAPSULE ORAL at 09:01

## 2017-01-01 RX ADMIN — PROMETHAZINE HYDROCHLORIDE 25 MG: 25 TABLET ORAL at 23:10

## 2017-01-01 RX ADMIN — SODIUM CHLORIDE: 9 INJECTION, SOLUTION INTRAVENOUS at 16:19

## 2017-01-01 RX ADMIN — QUETIAPINE FUMARATE 50 MG: 25 TABLET, FILM COATED ORAL at 22:16

## 2017-01-01 RX ADMIN — PROPOFOL 25 MG: 10 INJECTION, EMULSION INTRAVENOUS at 13:45

## 2017-01-01 RX ADMIN — POLYETHYLENE GLYCOL 3350 17 G: 17 POWDER, FOR SOLUTION ORAL at 09:57

## 2017-01-01 RX ADMIN — HALOPERIDOL LACTATE 0.5 MG: 2 SOLUTION, CONCENTRATE ORAL at 03:33

## 2017-01-01 RX ADMIN — SODIUM CHLORIDE: 9 INJECTION, SOLUTION INTRAVENOUS at 14:15

## 2017-01-01 RX ADMIN — PROPOFOL 50 MG: 10 INJECTION, EMULSION INTRAVENOUS at 09:09

## 2017-01-01 RX ADMIN — SUCRALFATE 1 G: 1 SUSPENSION ORAL at 17:07

## 2017-01-01 RX ADMIN — PANTOPRAZOLE SODIUM 40 MG: 40 TABLET, DELAYED RELEASE ORAL at 07:24

## 2017-01-01 RX ADMIN — PROPOFOL 30 MG: 10 INJECTION, EMULSION INTRAVENOUS at 15:57

## 2017-01-01 RX ADMIN — FINASTERIDE 5 MG: 5 TABLET, FILM COATED ORAL at 09:43

## 2017-01-01 RX ADMIN — HALOPERIDOL LACTATE 2 MG: 2 SOLUTION, CONCENTRATE ORAL at 21:07

## 2017-01-01 RX ADMIN — SUCRALFATE 1 G: 1 SUSPENSION ORAL at 22:36

## 2017-01-01 RX ADMIN — SUCRALFATE 1 G: 1 SUSPENSION ORAL at 13:44

## 2017-01-01 RX ADMIN — SODIUM CHLORIDE 125 ML/HR: 900 INJECTION, SOLUTION INTRAVENOUS at 01:51

## 2017-01-01 RX ADMIN — POLYETHYLENE GLYCOL 3350 17 G: 17 POWDER, FOR SOLUTION ORAL at 12:20

## 2017-01-01 RX ADMIN — PROPOFOL 25 MG: 10 INJECTION, EMULSION INTRAVENOUS at 14:07

## 2017-01-01 RX ADMIN — Medication 10 ML: at 06:55

## 2017-01-01 RX ADMIN — SODIUM CHLORIDE 125 ML/HR: 900 INJECTION, SOLUTION INTRAVENOUS at 02:14

## 2017-01-01 RX ADMIN — PROPOFOL 25 MG: 10 INJECTION, EMULSION INTRAVENOUS at 13:58

## 2017-01-01 RX ADMIN — SODIUM CHLORIDE 1000 ML: 900 INJECTION, SOLUTION INTRAVENOUS at 23:02

## 2017-01-01 RX ADMIN — PROPOFOL 30 MG: 10 INJECTION, EMULSION INTRAVENOUS at 16:01

## 2017-01-01 RX ADMIN — LISINOPRIL 5 MG: 10 TABLET ORAL at 09:56

## 2017-01-01 RX ADMIN — Medication 10 ML: at 15:34

## 2017-01-01 RX ADMIN — PROPOFOL 30 MG: 10 INJECTION, EMULSION INTRAVENOUS at 15:59

## 2017-01-01 RX ADMIN — PROMETHAZINE HYDROCHLORIDE 25 MG: 25 TABLET ORAL at 00:18

## 2017-01-01 RX ADMIN — Medication 10 ML: at 18:20

## 2017-01-01 RX ADMIN — DEXAMETHASONE SODIUM PHOSPHATE 12 MG: 4 INJECTION, SOLUTION INTRA-ARTICULAR; INTRALESIONAL; INTRAMUSCULAR; INTRAVENOUS; SOFT TISSUE at 14:31

## 2017-01-01 RX ADMIN — CARBOPLATIN 125 MG: 10 INJECTION, SOLUTION INTRAVENOUS at 15:29

## 2017-01-01 RX ADMIN — SODIUM CHLORIDE 1000 ML: 900 INJECTION, SOLUTION INTRAVENOUS at 14:45

## 2017-01-01 RX ADMIN — GABAPENTIN 800 MG: 400 CAPSULE ORAL at 15:14

## 2017-01-01 RX ADMIN — PROMETHAZINE HYDROCHLORIDE 25 MG: 25 TABLET ORAL at 17:51

## 2017-01-01 RX ADMIN — PANTOPRAZOLE SODIUM 40 MG: 40 TABLET, DELAYED RELEASE ORAL at 09:03

## 2017-01-01 RX ADMIN — LISINOPRIL 5 MG: 10 TABLET ORAL at 08:58

## 2017-01-01 RX ADMIN — Medication 10 ML: at 22:13

## 2017-01-01 RX ADMIN — SODIUM CHLORIDE: 9 INJECTION, SOLUTION INTRAVENOUS at 15:16

## 2017-01-01 RX ADMIN — PROMETHAZINE HYDROCHLORIDE 25 MG: 25 TABLET ORAL at 17:28

## 2017-01-01 RX ADMIN — Medication 10 ML: at 15:38

## 2017-01-01 RX ADMIN — PROMETHAZINE HYDROCHLORIDE 25 MG: 25 TABLET ORAL at 06:00

## 2017-01-01 RX ADMIN — PROPOFOL 30 MG: 10 INJECTION, EMULSION INTRAVENOUS at 16:03

## 2017-01-01 RX ADMIN — PIPERACILLIN SODIUM,TAZOBACTAM SODIUM 3.38 G: 3; .375 INJECTION, POWDER, FOR SOLUTION INTRAVENOUS at 13:43

## 2017-01-01 RX ADMIN — PROMETHAZINE HYDROCHLORIDE 25 MG: 25 TABLET ORAL at 06:16

## 2017-01-01 RX ADMIN — TAMSULOSIN HYDROCHLORIDE 0.4 MG: 0.4 CAPSULE ORAL at 09:00

## 2017-01-01 RX ADMIN — SODIUM CHLORIDE, SODIUM LACTATE, POTASSIUM CHLORIDE, CALCIUM CHLORIDE: 600; 310; 30; 20 INJECTION, SOLUTION INTRAVENOUS at 08:45

## 2017-01-01 RX ADMIN — FENTANYL CITRATE 25 MCG: 50 INJECTION, SOLUTION INTRAMUSCULAR; INTRAVENOUS at 10:13

## 2017-01-01 RX ADMIN — Medication 10 ML: at 14:45

## 2017-01-01 RX ADMIN — Medication 10 ML: at 17:00

## 2017-01-01 RX ADMIN — Medication 10 ML: at 05:49

## 2017-01-01 RX ADMIN — PROPOFOL 25 MG: 10 INJECTION, EMULSION INTRAVENOUS at 14:05

## 2017-01-01 RX ADMIN — QUETIAPINE FUMARATE 50 MG: 50 TABLET, FILM COATED ORAL at 22:00

## 2017-01-01 RX ADMIN — ASPIRIN 81 MG: 81 TABLET, COATED ORAL at 08:41

## 2017-01-01 RX ADMIN — PIPERACILLIN SODIUM,TAZOBACTAM SODIUM 3.38 G: 3; .375 INJECTION, POWDER, FOR SOLUTION INTRAVENOUS at 06:36

## 2017-01-01 RX ADMIN — SODIUM CHLORIDE 125 ML/HR: 900 INJECTION, SOLUTION INTRAVENOUS at 09:35

## 2017-01-01 RX ADMIN — SUCRALFATE 1 G: 1 SUSPENSION ORAL at 09:56

## 2017-01-01 RX ADMIN — Medication 10 ML: at 22:54

## 2017-01-01 RX ADMIN — ASPIRIN 81 MG: 81 TABLET, COATED ORAL at 09:51

## 2017-01-01 RX ADMIN — PROPOFOL 25 MG: 10 INJECTION, EMULSION INTRAVENOUS at 14:10

## 2017-01-01 RX ADMIN — TRAMADOL HYDROCHLORIDE 50 MG: 50 TABLET, FILM COATED ORAL at 21:08

## 2017-01-01 RX ADMIN — Medication 10 ML: at 06:00

## 2017-01-01 RX ADMIN — FINASTERIDE 5 MG: 5 TABLET, FILM COATED ORAL at 08:24

## 2017-01-01 RX ADMIN — PANTOPRAZOLE SODIUM 40 MG: 40 TABLET, DELAYED RELEASE ORAL at 08:37

## 2017-01-01 RX ADMIN — AMOXICILLIN AND CLAVULANATE POTASSIUM 1 TABLET: 875; 125 TABLET, FILM COATED ORAL at 12:00

## 2017-01-01 RX ADMIN — QUETIAPINE FUMARATE 50 MG: 25 TABLET, FILM COATED ORAL at 21:11

## 2017-01-01 RX ADMIN — PANTOPRAZOLE SODIUM 40 MG: 40 TABLET, DELAYED RELEASE ORAL at 09:31

## 2017-01-01 RX ADMIN — ASPIRIN 81 MG: 81 TABLET, COATED ORAL at 09:12

## 2017-01-01 RX ADMIN — PROPOFOL 25 MG: 10 INJECTION, EMULSION INTRAVENOUS at 13:51

## 2017-01-01 RX ADMIN — TAMSULOSIN HYDROCHLORIDE 0.8 MG: 0.4 CAPSULE ORAL at 08:56

## 2017-01-01 RX ADMIN — TRAMADOL HYDROCHLORIDE 50 MG: 50 TABLET, FILM COATED ORAL at 03:39

## 2017-01-01 RX ADMIN — HALOPERIDOL LACTATE 2 MG: 2 SOLUTION, CONCENTRATE ORAL at 04:07

## 2017-01-01 RX ADMIN — Medication 10 ML: at 14:09

## 2017-01-01 RX ADMIN — MORPHINE SULFATE 10 MG: 20 SOLUTION ORAL at 11:54

## 2017-01-01 RX ADMIN — ASPIRIN 81 MG: 81 TABLET, COATED ORAL at 09:01

## 2017-01-01 RX ADMIN — FAMOTIDINE 20 MG: 10 INJECTION INTRAVENOUS at 13:51

## 2017-01-01 RX ADMIN — PIPERACILLIN SODIUM,TAZOBACTAM SODIUM 3.38 G: 3; .375 INJECTION, POWDER, FOR SOLUTION INTRAVENOUS at 05:49

## 2017-01-01 RX ADMIN — ASPIRIN 81 MG: 81 TABLET, COATED ORAL at 09:31

## 2017-01-01 RX ADMIN — INSULIN LISPRO 2 UNITS: 100 INJECTION, SOLUTION INTRAVENOUS; SUBCUTANEOUS at 17:59

## 2017-01-01 RX ADMIN — PIPERACILLIN SODIUM,TAZOBACTAM SODIUM 3.38 G: 3; .375 INJECTION, POWDER, FOR SOLUTION INTRAVENOUS at 21:47

## 2017-01-01 RX ADMIN — ONDANSETRON 4 MG: 2 INJECTION INTRAMUSCULAR; INTRAVENOUS at 19:55

## 2017-01-01 RX ADMIN — MORPHINE SULFATE 10 MG: 20 SOLUTION ORAL at 09:29

## 2017-01-01 RX ADMIN — Medication 10 ML: at 07:13

## 2017-01-01 RX ADMIN — HALOPERIDOL LACTATE 0.5 MG: 2 SOLUTION, CONCENTRATE ORAL at 11:54

## 2017-01-01 RX ADMIN — FINASTERIDE 5 MG: 5 TABLET, FILM COATED ORAL at 12:55

## 2017-01-01 RX ADMIN — SODIUM CHLORIDE 5 MG: 9 INJECTION INTRAMUSCULAR; INTRAVENOUS; SUBCUTANEOUS at 17:59

## 2017-01-01 RX ADMIN — FENTANYL CITRATE 50 MCG: 50 INJECTION, SOLUTION INTRAMUSCULAR; INTRAVENOUS at 08:55

## 2017-01-01 RX ADMIN — Medication 10 ML: at 21:11

## 2017-01-01 RX ADMIN — LISINOPRIL 5 MG: 10 TABLET ORAL at 12:19

## 2017-01-01 RX ADMIN — TAMSULOSIN HYDROCHLORIDE 0.4 MG: 0.4 CAPSULE ORAL at 20:00

## 2017-01-01 RX ADMIN — GABAPENTIN 800 MG: 400 CAPSULE ORAL at 17:06

## 2017-01-01 RX ADMIN — SUCRALFATE 1 G: 1 SUSPENSION ORAL at 17:34

## 2017-01-01 RX ADMIN — Medication 10 ML: at 13:45

## 2017-01-01 RX ADMIN — SODIUM CHLORIDE 1000 ML: 900 INJECTION, SOLUTION INTRAVENOUS at 13:06

## 2017-01-01 RX ADMIN — SUCRALFATE 1 G: 1 SUSPENSION ORAL at 22:53

## 2017-01-01 RX ADMIN — PROPOFOL 50 MG: 10 INJECTION, EMULSION INTRAVENOUS at 15:53

## 2017-01-01 RX ADMIN — TAMSULOSIN HYDROCHLORIDE 0.8 MG: 0.4 CAPSULE ORAL at 12:20

## 2017-01-01 RX ADMIN — Medication 10 ML: at 13:51

## 2017-01-01 RX ADMIN — Medication 10 ML: at 22:07

## 2017-01-01 RX ADMIN — PROMETHAZINE HYDROCHLORIDE 25 MG: 25 TABLET ORAL at 11:46

## 2017-01-01 RX ADMIN — PANTOPRAZOLE SODIUM 40 MG: 40 TABLET, DELAYED RELEASE ORAL at 07:22

## 2017-01-01 RX ADMIN — PROPOFOL 25 MG: 10 INJECTION, EMULSION INTRAVENOUS at 13:40

## 2017-01-01 RX ADMIN — PROPOFOL 25 MG: 10 INJECTION, EMULSION INTRAVENOUS at 14:02

## 2017-01-01 RX ADMIN — SODIUM CHLORIDE 75 ML/HR: 900 INJECTION, SOLUTION INTRAVENOUS at 06:36

## 2017-01-01 RX ADMIN — Medication 10 ML: at 10:51

## 2017-01-01 RX ADMIN — SUCRALFATE 1 G: 1 SUSPENSION ORAL at 19:08

## 2017-01-01 RX ADMIN — FINASTERIDE 5 MG: 5 TABLET, FILM COATED ORAL at 08:36

## 2017-01-01 RX ADMIN — ONDANSETRON 4 MG: 2 INJECTION INTRAMUSCULAR; INTRAVENOUS at 18:11

## 2017-01-01 RX ADMIN — PIPERACILLIN SODIUM,TAZOBACTAM SODIUM 3.38 G: 3; .375 INJECTION, POWDER, FOR SOLUTION INTRAVENOUS at 00:54

## 2017-01-01 RX ADMIN — POLYETHYLENE GLYCOL 3350 17 G: 17 POWDER, FOR SOLUTION ORAL at 12:56

## 2017-01-01 RX ADMIN — Medication 10 ML: at 22:37

## 2017-01-01 RX ADMIN — LIDOCAINE HYDROCHLORIDE 20 MG: 20 INJECTION, SOLUTION EPIDURAL; INFILTRATION; INTRACAUDAL; PERINEURAL at 15:53

## 2017-01-01 RX ADMIN — TAMSULOSIN HYDROCHLORIDE 0.8 MG: 0.4 CAPSULE ORAL at 08:58

## 2017-01-01 RX ADMIN — Medication 500 UNITS: at 17:10

## 2017-01-01 RX ADMIN — GABAPENTIN 800 MG: 400 CAPSULE ORAL at 08:36

## 2017-01-01 RX ADMIN — FINASTERIDE 5 MG: 5 TABLET, FILM COATED ORAL at 09:31

## 2017-01-01 RX ADMIN — Medication 10 ML: at 22:16

## 2017-01-01 RX ADMIN — MORPHINE SULFATE 10 MG: 20 SOLUTION ORAL at 20:54

## 2017-01-01 RX ADMIN — Medication 10 ML: at 07:24

## 2017-01-01 RX ADMIN — TAMSULOSIN HYDROCHLORIDE 0.8 MG: 0.4 CAPSULE ORAL at 09:43

## 2017-01-01 RX ADMIN — LISINOPRIL 5 MG: 10 TABLET ORAL at 08:37

## 2017-01-01 RX ADMIN — Medication 10 ML: at 06:18

## 2017-01-01 RX ADMIN — Medication 10 ML: at 13:17

## 2017-01-01 RX ADMIN — PANTOPRAZOLE SODIUM 40 MG: 40 TABLET, DELAYED RELEASE ORAL at 06:55

## 2017-01-01 RX ADMIN — Medication 10 ML: at 06:27

## 2017-01-01 RX ADMIN — PROPOFOL 25 MG: 10 INJECTION, EMULSION INTRAVENOUS at 13:46

## 2017-01-01 RX ADMIN — FUROSEMIDE 20 MG: 10 INJECTION, SOLUTION INTRAMUSCULAR; INTRAVENOUS at 17:06

## 2017-01-01 RX ADMIN — SODIUM CHLORIDE 1000 ML: 900 INJECTION, SOLUTION INTRAVENOUS at 13:53

## 2017-01-01 RX ADMIN — HALOPERIDOL LACTATE 2 MG: 2 SOLUTION, CONCENTRATE ORAL at 20:54

## 2017-01-01 RX ADMIN — QUETIAPINE FUMARATE 50 MG: 25 TABLET ORAL at 21:07

## 2017-01-01 RX ADMIN — PACLITAXEL 100 MG: 6 INJECTION, SOLUTION INTRAVENOUS at 14:26

## 2017-01-01 RX ADMIN — FAMOTIDINE 20 MG: 10 INJECTION INTRAVENOUS at 12:58

## 2017-01-01 RX ADMIN — Medication 10 ML: at 14:27

## 2017-01-01 RX ADMIN — HALOPERIDOL LACTATE 2 MG: 2 SOLUTION, CONCENTRATE ORAL at 09:28

## 2017-01-01 RX ADMIN — PANTOPRAZOLE SODIUM 40 MG: 40 TABLET, DELAYED RELEASE ORAL at 08:24

## 2017-01-01 RX ADMIN — ASPIRIN 81 MG: 81 TABLET, COATED ORAL at 09:43

## 2017-01-01 RX ADMIN — PANTOPRAZOLE SODIUM 40 MG: 40 TABLET, DELAYED RELEASE ORAL at 07:13

## 2017-01-01 RX ADMIN — FINASTERIDE 5 MG: 5 TABLET, FILM COATED ORAL at 09:57

## 2017-01-01 RX ADMIN — GABAPENTIN 800 MG: 400 CAPSULE ORAL at 15:22

## 2017-01-01 RX ADMIN — Medication 10 ML: at 21:23

## 2017-01-01 RX ADMIN — GABAPENTIN 800 MG: 400 CAPSULE ORAL at 21:47

## 2017-01-01 RX ADMIN — FINASTERIDE 5 MG: 5 TABLET, FILM COATED ORAL at 09:13

## 2017-01-01 RX ADMIN — ONDANSETRON 4 MG: 2 INJECTION INTRAMUSCULAR; INTRAVENOUS at 16:59

## 2017-01-01 RX ADMIN — LIDOCAINE HYDROCHLORIDE 30 MG: 20 INJECTION, SOLUTION EPIDURAL; INFILTRATION; INTRACAUDAL; PERINEURAL at 12:48

## 2017-01-01 RX ADMIN — Medication 10 ML: at 07:14

## 2017-01-01 RX ADMIN — GABAPENTIN 800 MG: 400 CAPSULE ORAL at 09:56

## 2017-01-01 RX ADMIN — SODIUM CHLORIDE, PRESERVATIVE FREE 500 UNITS: 5 INJECTION INTRAVENOUS at 18:20

## 2017-01-01 RX ADMIN — Medication 10 ML: at 15:23

## 2017-01-01 RX ADMIN — HALOPERIDOL LACTATE 2 MG: 2 SOLUTION, CONCENTRATE ORAL at 10:56

## 2017-01-01 RX ADMIN — PROPOFOL 25 MG: 10 INJECTION, EMULSION INTRAVENOUS at 13:48

## 2017-01-01 RX ADMIN — LIDOCAINE HYDROCHLORIDE 100 MG: 20 INJECTION, SOLUTION EPIDURAL; INFILTRATION; INTRACAUDAL; PERINEURAL at 16:21

## 2017-01-01 RX ADMIN — FINASTERIDE 5 MG: 5 TABLET, FILM COATED ORAL at 09:03

## 2017-01-01 RX ADMIN — PROPOFOL 50 MG: 10 INJECTION, EMULSION INTRAVENOUS at 09:55

## 2017-01-01 RX ADMIN — PIPERACILLIN SODIUM,TAZOBACTAM SODIUM 3.38 G: 3; .375 INJECTION, POWDER, FOR SOLUTION INTRAVENOUS at 22:53

## 2017-01-01 RX ADMIN — PANTOPRAZOLE SODIUM 40 MG: 40 TABLET, DELAYED RELEASE ORAL at 10:13

## 2017-01-01 RX ADMIN — PROPOFOL 30 MG: 10 INJECTION, EMULSION INTRAVENOUS at 12:51

## 2017-01-01 RX ADMIN — PROMETHAZINE HYDROCHLORIDE 12.5 MG: 25 TABLET ORAL at 21:47

## 2017-01-01 RX ADMIN — FINASTERIDE 5 MG: 5 TABLET, FILM COATED ORAL at 08:58

## 2017-01-01 RX ADMIN — Medication 10 ML: at 15:47

## 2017-01-01 RX ADMIN — SUCRALFATE 1 G: 1 SUSPENSION ORAL at 08:36

## 2017-01-01 RX ADMIN — PROPOFOL 25 MG: 10 INJECTION, EMULSION INTRAVENOUS at 13:59

## 2017-01-01 RX ADMIN — SUCRALFATE 1 G: 1 SUSPENSION ORAL at 14:08

## 2017-01-01 RX ADMIN — TAMSULOSIN HYDROCHLORIDE 0.8 MG: 0.4 CAPSULE ORAL at 09:31

## 2017-01-01 RX ADMIN — Medication 10 ML: at 23:18

## 2017-01-01 RX ADMIN — FINASTERIDE 5 MG: 5 TABLET, FILM COATED ORAL at 09:52

## 2017-01-01 RX ADMIN — Medication 1 CAPSULE: at 12:00

## 2017-01-01 RX ADMIN — DIPHENHYDRAMINE HYDROCHLORIDE 25 MG: 50 INJECTION INTRAMUSCULAR; INTRAVENOUS at 13:00

## 2017-01-01 RX ADMIN — PANTOPRAZOLE SODIUM 40 MG: 40 TABLET, DELAYED RELEASE ORAL at 12:18

## 2017-01-01 RX ADMIN — TAMSULOSIN HYDROCHLORIDE 0.8 MG: 0.4 CAPSULE ORAL at 09:51

## 2017-01-01 RX ADMIN — Medication 10 ML: at 11:55

## 2017-01-01 RX ADMIN — ONDANSETRON 4 MG: 2 INJECTION INTRAMUSCULAR; INTRAVENOUS at 10:51

## 2017-01-01 RX ADMIN — LIDOCAINE HYDROCHLORIDE 40 MG: 20 INJECTION, SOLUTION EPIDURAL; INFILTRATION; INTRACAUDAL; PERINEURAL at 13:32

## 2017-01-01 RX ADMIN — SODIUM CHLORIDE: 9 INJECTION, SOLUTION INTRAVENOUS at 13:29

## 2017-01-01 RX ADMIN — SODIUM CHLORIDE 75 ML/HR: 900 INJECTION, SOLUTION INTRAVENOUS at 23:18

## 2017-01-01 RX ADMIN — TAMSULOSIN HYDROCHLORIDE 0.8 MG: 0.4 CAPSULE ORAL at 09:12

## 2017-01-01 RX ADMIN — FINASTERIDE 5 MG: 5 TABLET, FILM COATED ORAL at 12:20

## 2017-01-01 RX ADMIN — SUCRALFATE 1 G: 1 SUSPENSION ORAL at 21:23

## 2017-01-01 RX ADMIN — GABAPENTIN 800 MG: 400 CAPSULE ORAL at 13:44

## 2017-01-01 RX ADMIN — FINASTERIDE 5 MG: 5 TABLET, FILM COATED ORAL at 08:41

## 2017-01-01 RX ADMIN — ONDANSETRON 4 MG: 2 INJECTION INTRAMUSCULAR; INTRAVENOUS at 17:48

## 2017-01-01 RX ADMIN — PROMETHAZINE HYDROCHLORIDE 25 MG: 25 TABLET ORAL at 15:46

## 2017-01-01 RX ADMIN — ONDANSETRON 4 MG: 2 INJECTION INTRAMUSCULAR; INTRAVENOUS at 07:06

## 2017-01-01 RX ADMIN — PALONOSETRON HYDROCHLORIDE 0.25 MG: 0.25 INJECTION INTRAVENOUS at 13:46

## 2017-01-01 RX ADMIN — PIPERACILLIN SODIUM,TAZOBACTAM SODIUM 3.38 G: 3; .375 INJECTION, POWDER, FOR SOLUTION INTRAVENOUS at 14:08

## 2017-01-01 RX ADMIN — Medication 10 ML: at 06:36

## 2017-01-01 RX ADMIN — TAMSULOSIN HYDROCHLORIDE 0.8 MG: 0.4 CAPSULE ORAL at 08:37

## 2017-01-01 RX ADMIN — GABAPENTIN 800 MG: 400 CAPSULE ORAL at 22:53

## 2017-01-01 RX ADMIN — LISINOPRIL 5 MG: 10 TABLET ORAL at 12:55

## 2017-01-01 RX ADMIN — PANTOPRAZOLE SODIUM 40 MG: 40 TABLET, DELAYED RELEASE ORAL at 17:06

## 2017-01-01 RX ADMIN — FENTANYL CITRATE 25 MCG: 50 INJECTION, SOLUTION INTRAMUSCULAR; INTRAVENOUS at 14:59

## 2017-01-01 RX ADMIN — PALONOSETRON HYDROCHLORIDE 0.25 MG: 0.25 INJECTION INTRAVENOUS at 13:05

## 2017-01-01 RX ADMIN — PIPERACILLIN SODIUM,TAZOBACTAM SODIUM 3.38 G: 3; .375 INJECTION, POWDER, FOR SOLUTION INTRAVENOUS at 06:20

## 2017-01-01 RX ADMIN — PANTOPRAZOLE SODIUM 40 MG: 40 TABLET, DELAYED RELEASE ORAL at 19:08

## 2017-01-01 RX ADMIN — HALOPERIDOL LACTATE 0.5 MG: 2 SOLUTION, CONCENTRATE ORAL at 11:38

## 2017-01-01 RX ADMIN — HALOPERIDOL LACTATE 1 MG: 5 INJECTION, SOLUTION INTRAMUSCULAR at 20:57

## 2017-01-01 RX ADMIN — Medication 10 ML: at 17:08

## 2017-01-01 RX ADMIN — TAMSULOSIN HYDROCHLORIDE 0.8 MG: 0.4 CAPSULE ORAL at 09:52

## 2017-01-01 RX ADMIN — GABAPENTIN 800 MG: 400 CAPSULE ORAL at 08:58

## 2017-01-01 RX ADMIN — QUETIAPINE FUMARATE 50 MG: 25 TABLET, FILM COATED ORAL at 21:47

## 2017-01-01 RX ADMIN — PROMETHAZINE HYDROCHLORIDE 25 MG: 25 TABLET ORAL at 11:59

## 2017-01-01 RX ADMIN — Medication 10 ML: at 21:32

## 2017-01-01 RX ADMIN — SODIUM CHLORIDE, SODIUM LACTATE, POTASSIUM CHLORIDE, AND CALCIUM CHLORIDE 1000 ML: 600; 310; 30; 20 INJECTION, SOLUTION INTRAVENOUS at 08:30

## 2017-01-01 RX ADMIN — TAMSULOSIN HYDROCHLORIDE 0.8 MG: 0.4 CAPSULE ORAL at 12:54

## 2017-01-01 RX ADMIN — CARBOPLATIN 125 MG: 10 INJECTION, SOLUTION INTRAVENOUS at 16:37

## 2017-01-01 RX ADMIN — PROMETHAZINE HYDROCHLORIDE 25 MG: 25 TABLET ORAL at 09:59

## 2017-01-01 RX ADMIN — FINASTERIDE 5 MG: 5 TABLET, FILM COATED ORAL at 08:56

## 2017-01-01 RX ADMIN — PROPOFOL 30 MG: 10 INJECTION, EMULSION INTRAVENOUS at 12:49

## 2017-01-01 RX ADMIN — HALOPERIDOL LACTATE 0.5 MG: 2 SOLUTION, CONCENTRATE ORAL at 04:45

## 2017-01-01 RX ADMIN — QUETIAPINE FUMARATE 50 MG: 25 TABLET, FILM COATED ORAL at 22:13

## 2017-01-01 RX ADMIN — PANTOPRAZOLE SODIUM 40 MG: 40 TABLET, DELAYED RELEASE ORAL at 17:59

## 2017-01-01 RX ADMIN — PROPOFOL 30 MG: 10 INJECTION, EMULSION INTRAVENOUS at 15:55

## 2017-01-01 RX ADMIN — QUETIAPINE FUMARATE 25 MG: 25 TABLET, FILM COATED ORAL at 21:32

## 2017-01-01 RX ADMIN — PANTOPRAZOLE SODIUM 40 MG: 40 TABLET, DELAYED RELEASE ORAL at 08:08

## 2017-01-01 RX ADMIN — PANTOPRAZOLE SODIUM 40 MG: 40 TABLET, DELAYED RELEASE ORAL at 17:34

## 2017-01-01 RX ADMIN — PROPOFOL 25 MG: 10 INJECTION, EMULSION INTRAVENOUS at 13:54

## 2017-01-01 RX ADMIN — PROPOFOL 20 MG: 10 INJECTION, EMULSION INTRAVENOUS at 16:05

## 2017-01-01 RX ADMIN — ONDANSETRON 4 MG: 2 INJECTION INTRAMUSCULAR; INTRAVENOUS at 15:38

## 2017-01-01 RX ADMIN — SODIUM CHLORIDE: 9 INJECTION, SOLUTION INTRAVENOUS at 12:38

## 2017-01-01 RX ADMIN — ESMOLOL HYDROCHLORIDE 20 MG: 10 INJECTION INTRAVENOUS at 14:18

## 2017-01-01 RX ADMIN — SUCRALFATE 1 G: 1 SUSPENSION ORAL at 08:59

## 2017-01-01 RX ADMIN — QUETIAPINE FUMARATE 25 MG: 25 TABLET, FILM COATED ORAL at 22:07

## 2017-01-01 RX ADMIN — GABAPENTIN 800 MG: 400 CAPSULE ORAL at 22:37

## 2017-01-01 RX ADMIN — GABAPENTIN 800 MG: 400 CAPSULE ORAL at 17:34

## 2017-01-01 RX ADMIN — MORPHINE SULFATE 5 MG: 20 SOLUTION ORAL at 00:12

## 2017-01-01 RX ADMIN — PROPOFOL 25 MG: 10 INJECTION, EMULSION INTRAVENOUS at 13:41

## 2017-01-01 RX ADMIN — FENTANYL CITRATE 50 MCG: 50 INJECTION, SOLUTION INTRAMUSCULAR; INTRAVENOUS at 09:30

## 2017-01-01 RX ADMIN — CEFAZOLIN SODIUM 1 G: 1 INJECTION, POWDER, FOR SOLUTION INTRAMUSCULAR; INTRAVENOUS at 14:07

## 2017-01-01 RX ADMIN — ESMOLOL HYDROCHLORIDE 20 MG: 10 INJECTION INTRAVENOUS at 14:23

## 2017-01-01 RX ADMIN — SUCRALFATE 1 G: 1 SUSPENSION ORAL at 12:54

## 2017-01-01 RX ADMIN — INSULIN LISPRO 2 UNITS: 100 INJECTION, SOLUTION INTRAVENOUS; SUBCUTANEOUS at 12:02

## 2017-01-01 RX ADMIN — ONDANSETRON 4 MG: 2 INJECTION INTRAMUSCULAR; INTRAVENOUS at 02:47

## 2017-01-01 RX ADMIN — SUCRALFATE 1 G: 1 SUSPENSION ORAL at 17:59

## 2017-01-01 RX ADMIN — POTASSIUM CHLORIDE 40 MEQ: 20 SOLUTION ORAL at 08:36

## 2017-01-01 RX ADMIN — PANTOPRAZOLE SODIUM 40 MG: 40 TABLET, DELAYED RELEASE ORAL at 07:14

## 2017-01-01 RX ADMIN — PIPERACILLIN SODIUM,TAZOBACTAM SODIUM 3.38 G: 3; .375 INJECTION, POWDER, FOR SOLUTION INTRAVENOUS at 20:58

## 2017-01-01 RX ADMIN — PROPOFOL 50 MG: 10 INJECTION, EMULSION INTRAVENOUS at 13:34

## 2017-01-01 RX ADMIN — PANTOPRAZOLE SODIUM 40 MG: 40 GRANULE, DELAYED RELEASE ORAL at 07:30

## 2017-01-01 RX ADMIN — PROPOFOL 50 MG: 10 INJECTION, EMULSION INTRAVENOUS at 16:28

## 2017-01-01 RX ADMIN — PROPOFOL 50 MG: 10 INJECTION, EMULSION INTRAVENOUS at 16:21

## 2017-01-01 RX ADMIN — ONDANSETRON 4 MG: 2 INJECTION INTRAMUSCULAR; INTRAVENOUS at 07:14

## 2017-01-01 RX ADMIN — PROPOFOL 25 MG: 10 INJECTION, EMULSION INTRAVENOUS at 13:56

## 2017-01-01 RX ADMIN — PROPOFOL 25 MG: 10 INJECTION, EMULSION INTRAVENOUS at 13:38

## 2017-01-01 RX ADMIN — DIPHENHYDRAMINE HYDROCHLORIDE 25 MG: 50 INJECTION INTRAMUSCULAR; INTRAVENOUS at 14:46

## 2017-01-01 RX ADMIN — CEFAZOLIN 2 G: 1 INJECTION, POWDER, FOR SOLUTION INTRAMUSCULAR; INTRAVENOUS; PARENTERAL at 08:55

## 2017-01-01 RX ADMIN — ASPIRIN 81 MG: 81 TABLET, COATED ORAL at 08:56

## 2017-01-01 RX ADMIN — GABAPENTIN 800 MG: 400 CAPSULE ORAL at 21:23

## 2017-01-01 RX ADMIN — LIDOCAINE HYDROCHLORIDE 60 MG: 20 INJECTION, SOLUTION EPIDURAL; INFILTRATION; INTRACAUDAL; PERINEURAL at 13:34

## 2017-01-01 RX ADMIN — PROPOFOL 25 MG: 10 INJECTION, EMULSION INTRAVENOUS at 13:36

## 2017-01-01 RX ADMIN — ACETAMINOPHEN 1000 MG: 10 INJECTION, SOLUTION INTRAVENOUS at 22:37

## 2017-01-01 RX ADMIN — TAMSULOSIN HYDROCHLORIDE 0.8 MG: 0.4 CAPSULE ORAL at 08:41

## 2017-01-01 RX ADMIN — FINASTERIDE 5 MG: 5 TABLET, FILM COATED ORAL at 09:00

## 2017-01-01 RX ADMIN — Medication 10 ML: at 11:58

## 2017-02-16 NOTE — TELEPHONE ENCOUNTER
----- Message from Mono Krishnan sent at 2/16/2017 10:24 AM EST -----  Regarding: Dr. Whitney Rather: [phone]  Patient needs Rx refill for Vitamins D. Per daughter pharmacy sent request 2 times already.   Please contact when sent at [phone]

## 2017-04-05 PROBLEM — Z12.11 ENCOUNTER FOR HEMOCCULT SCREENING: Status: ACTIVE | Noted: 2017-01-01

## 2017-04-05 PROBLEM — H91.93 DECREASED HEARING OF BOTH EARS: Status: ACTIVE | Noted: 2017-01-01

## 2017-04-05 PROBLEM — Z66 DNR (DO NOT RESUSCITATE): Status: ACTIVE | Noted: 2017-01-01

## 2017-04-05 NOTE — PATIENT INSTRUCTIONS
Well Visit, Over 72: Care Instructions  Your Care Instructions  Physical exams can help you stay healthy. Your doctor has checked your overall health and may have suggested ways to take good care of yourself. He or she also may have recommended tests. At home, you can help prevent illness with healthy eating, regular exercise, and other steps. Follow-up care is a key part of your treatment and safety. Be sure to make and go to all appointments, and call your doctor if you are having problems. It's also a good idea to know your test results and keep a list of the medicines you take. How can you care for yourself at home? · Reach and stay at a healthy weight. This will lower your risk for many problems, such as obesity, diabetes, heart disease, and high blood pressure. · Get at least 30 minutes of exercise on most days of the week. Walking is a good choice. You also may want to do other activities, such as running, swimming, cycling, or playing tennis or team sports. · Do not smoke. Smoking can make health problems worse. If you need help quitting, talk to your doctor about stop-smoking programs and medicines. These can increase your chances of quitting for good. · Protect your skin from too much sun. When you're outdoors from 10 a.m. to 4 p.m., stay in the shade or cover up with clothing and a hat with a wide brim. Wear sunglasses that block UV rays. Even when it's cloudy, put broad-spectrum sunscreen (SPF 30 or higher) on any exposed skin. · See a dentist one or two times a year for checkups and to have your teeth cleaned. · Wear a seat belt in the car. · Limit alcohol to 2 drinks a day for men and 1 drink a day for women. Too much alcohol can cause health problems. Follow your doctor's advice about when to have certain tests. These tests can spot problems early. For men and women  · Cholesterol.  Your doctor will tell you how often to have this done based on your overall health and other things that can increase your risk for heart attack and stroke. · Blood pressure. Have your blood pressure checked during a routine doctor visit. Your doctor will tell you how often to check your blood pressure based on your age, your blood pressure results, and other factors. · Diabetes. Ask your doctor whether you should have tests for diabetes. · Vision. Experts recommend that you have yearly exams for glaucoma and other age-related eye problems. · Hearing. Tell your doctor if you notice any change in your hearing. You can have tests to find out how well you hear. · Colon cancer tests. Keep having colon cancer tests as your doctor recommends. You can have one of several types of tests. · Heart attack and stroke risk. At least every 4 to 6 years, you should have your risk for heart attack and stroke assessed. Your doctor uses factors such as your age, blood pressure, cholesterol, and whether you smoke or have diabetes to show what your risk for a heart attack or stroke is over the next 10 years. · Osteoporosis. Talk to your doctor about whether you should have a bone density test to find out whether you have thinning bones. Also ask your doctor about whether you should take calcium and vitamin D supplements. For women  · Pap test and pelvic exam. You may no longer need a Pap test. Talk with your doctor about whether to stop or continue to have Pap tests. · Breast exam and mammogram. Ask how often you should have a mammogram, which is an X-ray of your breasts. A mammogram can spot breast cancer before it can be felt and when it is easiest to treat. · Thyroid disease. Talk to your doctor about whether to have your thyroid checked as part of a regular physical exam. Women have an increased chance of a thyroid problem. For men  · Prostate exam. Talk to your doctor about whether you should have a blood test (called a PSA test) for prostate cancer.  Experts disagree on whether men should have this test. Some experts recommend that you discuss the benefits and risks of the test with your doctor. · Abdominal aortic aneurysm. Ask your doctor whether you should have a test to check for an aneurysm. You may need a test if you ever smoked or if your parent, brother, sister, or child has had an aneurysm. When should you call for help? Watch closely for changes in your health, and be sure to contact your doctor if you have any problems or symptoms that concern you. Where can you learn more? Go to http://germán-rishi.info/. Enter D935 in the search box to learn more about \"Well Visit, Over 65: Care Instructions. \"  Current as of: July 19, 2016  Content Version: 11.2  © 9260-4157 [employer]. Care instructions adapted under license by [employer] (which disclaims liability or warranty for this information). If you have questions about a medical condition or this instruction, always ask your healthcare professional. [place] [address] any warranty or liability for your use of this information. High Cholesterol: Care Instructions  Your Care Instructions  Cholesterol is a type of fat in your blood. It is needed for many body functions, such as making new cells. Cholesterol is made by your body. It also comes from food you eat. High cholesterol means that you have too much of the fat in your blood. This raises your risk of a heart attack and stroke. LDL and HDL are part of your total cholesterol. LDL is the \"bad\" cholesterol. High LDL can raise your risk for heart disease, heart attack, and stroke. HDL is the \"good\" cholesterol. It helps clear bad cholesterol from the body. High HDL is linked with a lower risk of heart disease, heart attack, and stroke. Your cholesterol levels help your doctor find out your risk for having a heart attack or stroke. You and your doctor can talk about whether you need to lower your risk and what treatment is best for you.   A heart-healthy lifestyle along with medicines can help lower your cholesterol and your risk. The way you choose to lower your risk will depend on how high your risk is for heart attack and stroke. It will also depend on how you feel about taking medicines. Follow-up care is a key part of your treatment and safety. Be sure to make and go to all appointments, and call your doctor if you are having problems. It's also a good idea to know your test results and keep a list of the medicines you take. How can you care for yourself at home? · Eat a variety of foods every day. Good choices include fruits, vegetables, whole grains (like oatmeal), dried beans and peas, nuts and seeds, soy products (like tofu), and fat-free or low-fat dairy products. · Replace butter, margarine, and hydrogenated or partially hydrogenated oils with olive and canola oils. (Canola oil margarine without trans fat is fine.)  · Replace red meat with fish, poultry, and soy protein (like tofu). · Limit processed and packaged foods like chips, crackers, and cookies. · Bake, broil, or steam foods. Don't brasher them. · Be physically active. Get at least 30 minutes of exercise on most days of the week. Walking is a good choice. You also may want to do other activities, such as running, swimming, cycling, or playing tennis or team sports. · Stay at a healthy weight or lose weight by making the changes in eating and physical activity listed above. Losing just a small amount of weight, even 5 to 10 pounds, can reduce your risk for having a heart attack or stroke. · Do not smoke. When should you call for help? Watch closely for changes in your health, and be sure to contact your doctor if:  · You need help making lifestyle changes. · You have questions about your medicine. Where can you learn more? Go to http://germán-rishi.info/. Enter H567 in the search box to learn more about \"High Cholesterol: Care Instructions. \"  Current as of: January 27, 2016  Content Version: 11.2  © 9756-3285 [employer]. Care instructions adapted under license by [employer] (which disclaims liability or warranty for this information). If you have questions about a medical condition or this instruction, always ask your healthcare professional. [address] any warranty or liability for your use of this information. Statins: Care Instructions  Your Care Instructions  Statins are medicines that lower your cholesterol and your risk for a heart attack and stroke. Cholesterol is a type of fat in your blood. If you have too much cholesterol, it can build up in blood vessels. This raises your risk of heart disease, heart attack, and stroke. Statins lower cholesterol by blocking how much cholesterol your body makes. This prevents cholesterol from building up in your blood vessels. This is called hardening of the arteries. It is the starting point for some heart and blood flow problems, such as heart disease. Statins may also reduce inflammation around the buildup (called plaque). This can lower the risk that the plaque will break apart and lead to a heart attack or stroke. A heart-healthy lifestyle is important for lowering your risk whether you take statins or not. This includes eating healthy foods, being active, staying at a healthy weight, and not smoking. You must take statins regularly for them to work well. If you stop, your cholesterol and your risk will go back up. Examples of statins include:  · Atorvastatin (Lipitor). · Lovastatin (Mevacor). · Pravastatin (Pravachol). · Simvastatin (Zocor). Statins interact with many medicines. So tell your doctor all of the other medicines that you take. These include prescription medicines, over-the-counter medicines, dietary supplements, and herbal products. Follow-up care is a key part of your treatment and safety.  Be sure to make and go to all appointments, and call your doctor if you are having problems. It's also a good idea to know your test results and keep a list of the medicines you take. How can you care for yourself at home? · Take statins exactly as your doctor tells you. High cholesterol has no symptoms. So it is easy to forget to take the pills. Try to make a system that reminds you to take them. · Do not take two or more medicines at the same time unless the doctor told you to. Statins can interact with other medicines. · Always tell your doctor if you think you are having a side effect. If side effects are a problem with one medicine, a different one may be used. · Keep making the lifestyle changes your doctor suggests. Eat heart-healthy foods, be active, don't smoke, and stay at a healthy weight. · Talk to your doctor about avoiding grapefruit juice if you take statins. Grapefruit juice can raise the level of this medicine in your blood. This could increase side effects. When should you call for help? Watch closely for changes in your health, and be sure to contact your doctor if:  · You have side effects of statins. These include:  ¨ Fatigue. ¨ Upset stomach. ¨ Gas. ¨ Constipation. ¨ Pain or cramps in the belly. ¨ Muscle aches. · You have any new symptoms or side effects. Where can you learn more? Go to http://germán-rishi.info/. Enter R358 in the search box to learn more about \"Statins: Care Instructions. \"  Current as of: June 30, 2016  Content Version: 11.2  © 8377-1216 [employer] ([employer]). Care instructions adapted under license by [employer] (which disclaims liability or warranty for this information). If you have questions about a medical condition or this instruction, always ask your healthcare professional. [place] [address] any warranty or liability for your use of this information.          Heart-Healthy Diet: Care Instructions  Your Care Instructions    A heart-healthy diet has lots of vegetables, fruits, nuts, beans, and whole grains, and is low in salt. It limits foods that are high in saturated fat, such as meats, cheeses, and fried foods. It may be hard to change your diet, but even small changes can lower your risk of heart attack and heart disease. Follow-up care is a key part of your treatment and safety. Be sure to make and go to all appointments, and call your doctor if you are having problems. It's also a good idea to know your test results and keep a list of the medicines you take. How can you care for yourself at home? Watch your portions  · Learn what a serving is. A \"serving\" and a \"portion\" are not always the same thing. Make sure that you are not eating larger portions than are recommended. For example, a serving of pasta is ½ cup. A serving size of meat is 2 to 3 ounces. A 3-ounce serving is about the size of a deck of cards. Measure serving sizes until you are good at [place]" them. Keep in mind that restaurants often serve portions that are 2 or 3 times the size of one serving. · To keep your energy level up and keep you from feeling hungry, eat often but in smaller portions. · Eat only the number of calories you need to stay at a healthy weight. If you need to lose weight, eat fewer calories than your body burns (through exercise and other physical activity). Eat more fruits and vegetables  · Eat a variety of fruit and vegetables every day. Dark green, deep orange, red, or yellow fruits and vegetables are especially good for you. Examples include spinach, carrots, peaches, and berries. · Keep carrots, celery, and other veggies handy for snacks. Buy fruit that is in season and store it where you can see it so that you will be tempted to eat it. · Cook dishes that have a lot of veggies in them, such as stir-fries and soups. Limit saturated and trans fat  · Read food labels, and try to avoid saturated and trans fats. They increase your risk of heart disease. Trans fat is found in many processed foods such as cookies and crackers. · Use olive or canola oil when you cook. Try cholesterol-lowering spreads, such as Benecol or Take Control. · Bake, broil, grill, or steam foods instead of frying them. · Choose lean meats instead of high-fat meats such as hot dogs and sausages. Cut off all visible fat when you prepare meat. · Eat fish, skinless poultry, and meat alternatives such as soy products instead of high-fat meats. Soy products, such as tofu, may be especially good for your heart. · Choose low-fat or fat-free milk and dairy products. Eat fish  · Eat at least two servings of fish a week. Certain fish, such as salmon and tuna, contain omega-3 fatty acids, which may help reduce your risk of heart attack. Eat foods high in fiber  · Eat a variety of grain products every day. Include whole-grain foods that have lots of fiber and nutrients. Examples of whole-grain foods include oats, whole wheat bread, and brown rice. · Buy whole-grain breads and cereals, instead of white bread or pastries. Limit salt and sodium  · Limit how much salt and sodium you eat to help lower your blood pressure. · Taste food before you salt it. Add only a little salt when you think you need it. With time, your taste buds will adjust to less salt. · Eat fewer snack items, fast foods, and other high-salt, processed foods. Check food labels for the amount of sodium in packaged foods. · Choose low-sodium versions of canned goods (such as soups, vegetables, and beans). Limit sugar  · Limit drinks and foods with added sugar. These include candy, desserts, and soda pop. Limit alcohol  · Limit alcohol to no more than 2 drinks a day for men and 1 drink a day for women. Too much alcohol can cause health problems. When should you call for help? Watch closely for changes in your health, and be sure to contact your doctor if:  · You would like help planning heart-healthy meals.   Where can you learn more?  Go to http://germán-rishi.info/. Enter V137 in the search box to learn more about \"Heart-Healthy Diet: Care Instructions. \"  Current as of: January 27, 2016  Content Version: 11.2  © 0440-4082 [employer]. Care instructions adapted under license by [employer] (which disclaims liability or warranty for this information). If you have questions about a medical condition or this instruction, always ask your healthcare professional. [place] [address] any warranty or liability for your use of this information. Advance Directives: Care Instructions  Your Care Instructions  An advance directive is a legal way to state your wishes at the end of your life. It tells your family and your doctor what to do if you can no longer say what you want. There are two main types of advance directives. You can change them any time that your wishes change. · A living will tells your family and your doctor your wishes about life support and other treatment. · A durable power of  for health care lets you name a person to make treatment decisions for you when you can't speak for yourself. This person is called a health care agent. If you do not have an advance directive, decisions about your medical care may be made by a doctor or a  who doesn't know you. It may help to think of an advance directive as a gift to the people who care for you. If you have one, they won't have to make tough decisions by themselves. Follow-up care is a key part of your treatment and safety. Be sure to make and go to all appointments, and call your doctor if you are having problems. It's also a good idea to know your test results and keep a list of the medicines you take. How can you care for yourself at home? · Discuss your wishes with your loved ones and your doctor. This way, there are no surprises. · Many states have a unique form.  Or you might use a universal form that has been approved by many states. This kind of form can sometimes be completed and stored online. Your electronic copy will then be available wherever you have a connection to the Internet. In most cases, doctors will respect your wishes even if you have a form from a different state. · You don't need a  to do an advance directive. But you may want to get legal advice. · Think about these questions when you prepare an advance directive:  ¨ Who do you want to make decisions about your medical care if you are not able to? Many people choose a family member or close friend. ¨ Do you know enough about life support methods that might be used? If not, talk to your doctor so you understand. ¨ What are you most afraid of that might happen? You might be afraid of having pain, losing your independence, or being kept alive by machines. ¨ Where would you prefer to die? Choices include your home, a hospital, or a nursing home. ¨ Would you like to have information about hospice care to support you and your family? ¨ Do you want to donate organs when you die? ¨ Do you want certain Sabianist practices performed before you die? If so, put your wishes in the advance directive. · Read your advance directive every year, and make changes as needed. When should you call for help? Be sure to contact your doctor if you have any questions. Where can you learn more? Go to http://germán-rishi.info/. Enter R264 in the search box to learn more about \"Advance Directives: Care Instructions. \"  Current as of: November 17, 2016  Content Version: 11.2  © 4714-3940 [employer]. Care instructions adapted under license by [employer] (which disclaims liability or warranty for this information).  If you have questions about a medical condition or this instruction, always ask your healthcare professional. [address] any warranty or liability for your use of this information.

## 2017-04-05 NOTE — PROGRESS NOTES
[address]        Name and  verified        Chief Complaint   Patient presents with    Annual Wellness Visit         Health Maintenance reviewed-discussed with patient. Advance Directives Care Planning Information given, patient acknowledged understanding.

## 2017-04-05 NOTE — ACP (ADVANCE CARE PLANNING)
Advance Care Planning    Length of ACP Conversation in minutes:  16 minutes    Authorized Decision Maker (if patient is incapable of making informed decisions): This person is: Other Legally Authorized Decision Maker which would be his current son Vesta Klein Tel ## noted      For Patients with Decision Making Capacity:   Values/Goals: Exploration of values, goals, and preferences if recovery is not expected, even with continued medical treatment in the event of:  Imminent death, Severe, permanent brain injury  \"In these circumstances, what matters most to you? \" patient in the presence of family member stated that care focused more on comfort and the quality of life than Care focused more on quantity (length) of life and wants to be DNR form given will sign and bring us a copy on the later date

## 2017-04-05 NOTE — MR AVS SNAPSHOT
Visit Information Date & Time Provider Department Dept. Phone Encounter #  
 4/5/2017  2:30 PM Jessica Alvarez MD [address] [place] [address] OFFICE-ANNEX [phone] [id number] Follow-up Instructions Return in about 6 months (around 10/5/2017), or if symptoms worsen or fail to improve. Upcoming Health Maintenance Date Due  
 GLAUCOMA SCREENING Q2Y 3/13/2016 MEDICARE YEARLY EXAM 8/6/2016 DTaP/Tdap/Td series (2 - Td) 4/11/2024 Allergies as of 4/5/2017  Review Complete On: 4/5/2017 By: Janessa Foss LPN No Known Allergies Current Immunizations  Reviewed on 8/6/2015 Name Date Influenza High Dose Vaccine PF 9/15/2016, 12/7/2015 Influenza Vaccine 4/11/2014 Influenza Vaccine PF 10/9/2013 Pneumococcal Conjugate (PCV-13) 8/6/2015 Pneumococcal Polysaccharide (PPSV-23) 4/11/2014 Tdap 4/11/2014 Not reviewed this visit You Were Diagnosed With   
  
 Codes Comments Routine general medical examination at a health care facility    -  Primary ICD-10-CM: Z00.00 ICD-9-CM: V70.0 Advance directive discussed with patient     ICD-10-CM: Z71.89 ICD-9-CM: V65.49 Screening for alcoholism     ICD-10-CM: Z13.89 ICD-9-CM: V79.1 Encounter for immunization     ICD-10-CM: A01 ICD-9-CM: V03.89 Advanced directives, counseling/discussion     ICD-10-CM: Z71.89 ICD-9-CM: V65.49 DNR (do not resuscitate)     ICD-10-CM: F89 ICD-9-CM: V49.86 Essential hypertension     ICD-10-CM: I10 
ICD-9-CM: 401.9 Hypercholesterolemia     ICD-10-CM: E78.00 ICD-9-CM: 272.0 Decreased hearing of both ears     ICD-10-CM: H91.93 
ICD-9-CM: 389.9 Encounter for Hemoccult screening     ICD-10-CM: Z12.11 ICD-9-CM: V76.51 Vitals BP Pulse Temp Resp Height(growth percentile) Weight(growth percentile) 146/79 78 97.9 °F (36.6 °C) (Oral) 14 6' (1.829 m) 206 lb 6.4 oz (93.6 kg) SpO2 BMI Smoking Status 95% 27.99 kg/m2 Former Smoker Vitals History BMI and BSA Data Body Mass Index Body Surface Area  
 27.99 kg/m 2 2.18 m 2 Preferred Pharmacy Pharmacy Name Phone [facility]/PHARMACY #[id number] [facility] [phone] Your Updated Medication List  
  
   
This list is accurate as of: 4/5/17  3:39 PM.  Always use your most recent med list.  
  
  
  
  
 aspirin 81 mg tablet Take 1 Tab by mouth daily. calcium-cholecalciferol (D3) tablet Commonly known as:  CALTRATE 600+D Take 1 Tab by mouth two (2) times a day. capsaicin 0.1 % topical cream  
Commonly known as:  CAPZASIN-HP Apply  to affected area three (3) times daily. chlorpheniramine 4 mg tablet Commonly known as:  ALLERGY RELIEF(CHLORPHENIRAMN) TAKE 1 TABLET BY MOUTH TWICE A DAY AS NEEDED FOR ALLERGIES OR RHINITIS * cyanocobalamin 1,000 mcg tablet TAKE 1 TABLET BY MOUTH EVERY DAY  
  
 * VITAMIN B-12 1,000 mcg tablet Generic drug:  cyanocobalamin ER  
TAKE 1 TABLET BY MOUTH EVERY DAY  
  
 famotidine 20 mg tablet Commonly known as:  PEPCID Take 1 Tab by mouth two (2) times a day. finasteride 5 mg tablet Commonly known as:  PROSCAR Take 1 Tab by mouth daily. Indications: SYMPTOMATIC BENIGN PROSTATIC HYPERPLASIA  
  
 fluticasone 50 mcg/actuation nasal spray Commonly known as:  Gabriel Chavarria 2 puffs daily  Indications: ALLERGIC CONJUNCTIVITIS, ALLERGIC RHINITIS  
  
 gabapentin 800 mg tablet Commonly known as:  NEURONTIN  
TAKE 1 TABLET BY MOUTH 3 TIMES A DAY HYDROcodone-ibuprofen 7.5-200 mg per tablet Commonly known as:  Jeraline Socorro Take 1 Tab by mouth nightly as needed for Pain. lisinopril 5 mg tablet Commonly known as:  PRINIVIL, ZESTRIL  
TAKE 1 TAB BY MOUTH DAILY. loratadine 10 mg tablet Commonly known as:  Arleta Pals Take 1 Tab by mouth daily. olopatadine 0.1 % ophthalmic solution Commonly known as:  PATANOL Administer 2 Drops to both eyes two (2) times a day. ondansetron 4 mg disintegrating tablet Commonly known as:  ZOFRAN ODT Take 1 Tab by mouth every eight (8) hours as needed for Nausea. permethrin 5 % topical cream  
Commonly known as:  ACTICIN  
apply sparingly as directed, apply sparingly from the forehead hairlines all over the entire body including the soles of the feet and between the toes, leave on for 12 hrs then rinse off, repeat the same in 3 days  
  
 polyethylene glycol 17 gram/dose powder Commonly known as:  Catalina Pile DISSOLVE 1 SCOOP IN THE WATER ONCE A DAY  
  
 psyllium packet Commonly known as:  METAMUCIL Take 1 Packet by mouth two (2) times a day. Hold for Loose bowl Movement  
  
 raNITIdine 150 mg tablet Commonly known as:  ZANTAC Take 150 mg by mouth two (2) times a day. tamsulosin 0.4 mg capsule Commonly known as:  FLOMAX TAKE 2 CAPS BY MOUTH DAILY. triamcinolone 0.5 % topical cream  
Commonly known as:  ARISTOCORT Apply  to affected area two (2) times a day. use thin layer  
  
 varicella zoster vacine live 19,400 unit/0.65 mL Susr injection Commonly known as:  varicella-zoster vacine live 1 Vial by SubCUTAneous route once for 1 dose. * Notice: This list has 2 medication(s) that are the same as other medications prescribed for you. Read the directions carefully, and ask your doctor or other care provider to review them with you. Prescriptions Printed Refills  
 varicella zoster vacine live (VARICELLA-ZOSTER VACINE LIVE) 19,400 unit/0.65 mL susr injection 0 Si Vial by SubCUTAneous route once for 1 dose. Class: Print Route: SubCUTAneous Prescriptions Sent to Pharmacy Refills  
 calcium-cholecalciferol, D3, (CALTRATE 600+D) tablet 11 Sig: Take 1 Tab by mouth two (2) times a day.   
 Class: Normal  
 Pharmacy: [facility]/pharmacy #[id number] - [address], [address] [place] [address] #: [phone] Route: Oral  
  
We Performed the Following ADVANCE CARE PLANNING FIRST 30 MINS [80981 CPT(R)] CBC W/O DIFF [98190 CPT(R)] CHOLESTEROL, HDL [id number] CPT(R)] CHOLESTEROL, TOTAL [30789 CPT(R)] METABOLIC PANEL, COMPREHENSIVE [61662 CPT(R)] OCCULT BLOOD, IMMUNOASSAY (FIT) [id number] CPT(R)] REFERRAL TO AUDIOLOGY [REF7 Custom] Comments:  
 Please evaluate patient for Decrease hearing TSH 3RD GENERATION [04673 CPT(R)] Follow-up Instructions Return in about 6 months (around 10/5/2017), or if symptoms worsen or fail to improve. Referral Information Referral ID Referred By Referred To  
  
 [id number] Adriana Spare Not Available Visits Status Start Date End Date 1 New Request 4/5/17 4/5/18 If your referral has a status of pending review or denied, additional information will be sent to support the outcome of this decision. Patient Instructions Well Visit, Over 72: Care Instructions Your Care Instructions Physical exams can help you stay healthy. Your doctor has checked your overall health and may have suggested ways to take good care of yourself. He or she also may have recommended tests. At home, you can help prevent illness with healthy eating, regular exercise, and other steps. Follow-up care is a key part of your treatment and safety. Be sure to make and go to all appointments, and call your doctor if you are having problems. It's also a good idea to know your test results and keep a list of the medicines you take. How can you care for yourself at home? · Reach and stay at a healthy weight. This will lower your risk for many problems, such as obesity, diabetes, heart disease, and high blood pressure. · Get at least 30 minutes of exercise on most days of the week.  Walking is a good choice. You also may want to do other activities, such as running, swimming, cycling, or playing tennis or team sports. · Do not smoke. Smoking can make health problems worse. If you need help quitting, talk to your doctor about stop-smoking programs and medicines. These can increase your chances of quitting for good. · Protect your skin from too much sun. When you're outdoors from 10 a.m. to 4 p.m., stay in the shade or cover up with clothing and a hat with a wide brim. Wear sunglasses that block UV rays. Even when it's cloudy, put broad-spectrum sunscreen (SPF 30 or higher) on any exposed skin. · See a dentist one or two times a year for checkups and to have your teeth cleaned. · Wear a seat belt in the car. · Limit alcohol to 2 drinks a day for men and 1 drink a day for women. Too much alcohol can cause health problems. Follow your doctor's advice about when to have certain tests. These tests can spot problems early. For men and women · Cholesterol. Your doctor will tell you how often to have this done based on your overall health and other things that can increase your risk for heart attack and stroke. · Blood pressure. Have your blood pressure checked during a routine doctor visit. Your doctor will tell you how often to check your blood pressure based on your age, your blood pressure results, and other factors. · Diabetes. Ask your doctor whether you should have tests for diabetes. · Vision. Experts recommend that you have yearly exams for glaucoma and other age-related eye problems. · Hearing. Tell your doctor if you notice any change in your hearing. You can have tests to find out how well you hear. · Colon cancer tests. Keep having colon cancer tests as your doctor recommends. You can have one of several types of tests. · Heart attack and stroke risk. At least every 4 to 6 years, you should have your risk for heart attack and stroke assessed.  Your doctor uses factors such as your age, blood pressure, cholesterol, and whether you smoke or have diabetes to show what your risk for a heart attack or stroke is over the next 10 years. · Osteoporosis. Talk to your doctor about whether you should have a bone density test to find out whether you have thinning bones. Also ask your doctor about whether you should take calcium and vitamin D supplements. For women · Pap test and pelvic exam. You may no longer need a Pap test. Talk with your doctor about whether to stop or continue to have Pap tests. · Breast exam and mammogram. Ask how often you should have a mammogram, which is an X-ray of your breasts. A mammogram can spot breast cancer before it can be felt and when it is easiest to treat. · Thyroid disease. Talk to your doctor about whether to have your thyroid checked as part of a regular physical exam. Women have an increased chance of a thyroid problem. For men · Prostate exam. Talk to your doctor about whether you should have a blood test (called a PSA test) for prostate cancer. Experts disagree on whether men should have this test. Some experts recommend that you discuss the benefits and risks of the test with your doctor. · Abdominal aortic aneurysm. Ask your doctor whether you should have a test to check for an aneurysm. You may need a test if you ever smoked or if your parent, brother, sister, or child has had an aneurysm. When should you call for help? Watch closely for changes in your health, and be sure to contact your doctor if you have any problems or symptoms that concern you. Where can you learn more? Go to http://germán-rishi.info/. Enter J845 in the search box to learn more about \"Well Visit, Over 65: Care Instructions. \" Current as of: July 19, 2016 Content Version: 11.2 © 3149-3051 [employer].  Care instructions adapted under license by [employer] (which disclaims liability or warranty for this information). If you have questions about a medical condition or this instruction, always ask your healthcare professional. [address] any warranty or liability for your use of this information. High Cholesterol: Care Instructions Your Care Instructions Cholesterol is a type of fat in your blood. It is needed for many body functions, such as making new cells. Cholesterol is made by your body. It also comes from food you eat. High cholesterol means that you have too much of the fat in your blood. This raises your risk of a heart attack and stroke. LDL and HDL are part of your total cholesterol. LDL is the \"bad\" cholesterol. High LDL can raise your risk for heart disease, heart attack, and stroke. HDL is the \"good\" cholesterol. It helps clear bad cholesterol from the body. High HDL is linked with a lower risk of heart disease, heart attack, and stroke. Your cholesterol levels help your doctor find out your risk for having a heart attack or stroke. You and your doctor can talk about whether you need to lower your risk and what treatment is best for you. A heart-healthy lifestyle along with medicines can help lower your cholesterol and your risk. The way you choose to lower your risk will depend on how high your risk is for heart attack and stroke. It will also depend on how you feel about taking medicines. Follow-up care is a key part of your treatment and safety. Be sure to make and go to all appointments, and call your doctor if you are having problems. It's also a good idea to know your test results and keep a list of the medicines you take. How can you care for yourself at home? · Eat a variety of foods every day. Good choices include fruits, vegetables, whole grains (like oatmeal), dried beans and peas, nuts and seeds, soy products (like tofu), and fat-free or low-fat dairy products.  
· Replace butter, margarine, and hydrogenated or partially hydrogenated oils with olive and canola oils. (Canola oil margarine without trans fat is fine.) · Replace red meat with fish, poultry, and soy protein (like tofu). · Limit processed and packaged foods like chips, crackers, and cookies. · Bake, broil, or steam foods. Don't brasher them. · Be physically active. Get at least 30 minutes of exercise on most days of the week. Walking is a good choice. You also may want to do other activities, such as running, swimming, cycling, or playing tennis or team sports. · Stay at a healthy weight or lose weight by making the changes in eating and physical activity listed above. Losing just a small amount of weight, even 5 to 10 pounds, can reduce your risk for having a heart attack or stroke. · Do not smoke. When should you call for help? Watch closely for changes in your health, and be sure to contact your doctor if: 
· You need help making lifestyle changes. · You have questions about your medicine. Where can you learn more? Go to http://germán-rishi.info/. Enter Y249 in the search box to learn more about \"High Cholesterol: Care Instructions. \" Current as of: January 27, 2016 Content Version: 11.2 © 3356-7515 [employer] Pupp[employer]. Care instructions adapted under license by [employer] - innovate | mentor | invest (which disclaims liability or warranty for this information). If you have questions about a medical condition or this instruction, always ask your healthcare professional. [place] [address] any warranty or liability for your use of this information. Statins: Care Instructions Your Care Instructions Statins are medicines that lower your cholesterol and your risk for a heart attack and stroke. Cholesterol is a type of fat in your blood. If you have too much cholesterol, it can build up in blood vessels. This raises your risk of heart disease, heart attack, and stroke.  
Statins lower cholesterol by blocking how much cholesterol your body makes. This prevents cholesterol from building up in your blood vessels. This is called hardening of the arteries. It is the starting point for some heart and blood flow problems, such as heart disease. Statins may also reduce inflammation around the buildup (called plaque). This can lower the risk that the plaque will break apart and lead to a heart attack or stroke. A heart-healthy lifestyle is important for lowering your risk whether you take statins or not. This includes eating healthy foods, being active, staying at a healthy weight, and not smoking. You must take statins regularly for them to work well. If you stop, your cholesterol and your risk will go back up. Examples of statins include: · Atorvastatin (Lipitor). · Lovastatin (Mevacor). · Pravastatin (Pravachol). · Simvastatin (Zocor). Statins interact with many medicines. So tell your doctor all of the other medicines that you take. These include prescription medicines, over-the-counter medicines, dietary supplements, and herbal products. Follow-up care is a key part of your treatment and safety. Be sure to make and go to all appointments, and call your doctor if you are having problems. It's also a good idea to know your test results and keep a list of the medicines you take. How can you care for yourself at home? · Take statins exactly as your doctor tells you. High cholesterol has no symptoms. So it is easy to forget to take the pills. Try to make a system that reminds you to take them. · Do not take two or more medicines at the same time unless the doctor told you to. Statins can interact with other medicines. · Always tell your doctor if you think you are having a side effect. If side effects are a problem with one medicine, a different one may be used. · Keep making the lifestyle changes your doctor suggests. Eat heart-healthy foods, be active, don't smoke, and stay at a healthy weight. · Talk to your doctor about avoiding grapefruit juice if you take statins. Grapefruit juice can raise the level of this medicine in your blood. This could increase side effects. When should you call for help? Watch closely for changes in your health, and be sure to contact your doctor if: 
· You have side effects of statins. These include: ¨ Fatigue. ¨ Upset stomach. ¨ Gas. ¨ Constipation. ¨ Pain or cramps in the belly. ¨ Muscle aches. · You have any new symptoms or side effects. Where can you learn more? Go to http://germán-risih.info/. Enter R358 in the search box to learn more about \"Statins: Care Instructions. \" Current as of: June 30, 2016 Content Version: 11.2 © 1742-2393 [employer] - Smart[employer]. Care instructions adapted under license by [employer] (which disclaims liability or warranty for this information). If you have questions about a medical condition or this instruction, always ask your healthcare professional. [place] [address] any warranty or liability for your use of this information. Heart-Healthy Diet: Care Instructions Your Care Instructions A heart-healthy diet has lots of vegetables, fruits, nuts, beans, and whole grains, and is low in salt. It limits foods that are high in saturated fat, such as meats, cheeses, and fried foods. It may be hard to change your diet, but even small changes can lower your risk of heart attack and heart disease. Follow-up care is a key part of your treatment and safety. Be sure to make and go to all appointments, and call your doctor if you are having problems. It's also a good idea to know your test results and keep a list of the medicines you take. How can you care for yourself at home? Watch your portions · Learn what a serving is. A \"serving\" and a \"portion\" are not always the same thing.  Make sure that you are not eating larger portions than are recommended. For example, a serving of pasta is ½ cup. A serving size of meat is 2 to 3 ounces. A 3-ounce serving is about the size of a deck of cards. Measure serving sizes until you are good at Manning" them. Keep in mind that restaurants often serve portions that are 2 or 3 times the size of one serving. · To keep your energy level up and keep you from feeling hungry, eat often but in smaller portions. · Eat only the number of calories you need to stay at a healthy weight. If you need to lose weight, eat fewer calories than your body burns (through exercise and other physical activity). Eat more fruits and vegetables · Eat a variety of fruit and vegetables every day. Dark green, deep orange, red, or yellow fruits and vegetables are especially good for you. Examples include spinach, carrots, peaches, and berries. · Keep carrots, celery, and other veggies handy for snacks. Buy fruit that is in season and store it where you can see it so that you will be tempted to eat it. · Cook dishes that have a lot of veggies in them, such as stir-fries and soups. Limit saturated and trans fat · Read food labels, and try to avoid saturated and trans fats. They increase your risk of heart disease. Trans fat is found in many processed foods such as cookies and crackers. · Use olive or canola oil when you cook. Try cholesterol-lowering spreads, such as Benecol or Take Control. · Bake, broil, grill, or steam foods instead of frying them. · Choose lean meats instead of high-fat meats such as hot dogs and sausages. Cut off all visible fat when you prepare meat. · Eat fish, skinless poultry, and meat alternatives such as soy products instead of high-fat meats. Soy products, such as tofu, may be especially good for your heart. · Choose low-fat or fat-free milk and dairy products. Eat fish · Eat at least two servings of fish a week.  Certain fish, such as salmon and tuna, contain omega-3 fatty acids, which may help reduce your risk of heart attack. Eat foods high in fiber · Eat a variety of grain products every day. Include whole-grain foods that have lots of fiber and nutrients. Examples of whole-grain foods include oats, whole wheat bread, and brown rice. · Buy whole-grain breads and cereals, instead of white bread or pastries. Limit salt and sodium · Limit how much salt and sodium you eat to help lower your blood pressure. · Taste food before you salt it. Add only a little salt when you think you need it. With time, your taste buds will adjust to less salt. · Eat fewer snack items, fast foods, and other high-salt, processed foods. Check food labels for the amount of sodium in packaged foods. · Choose low-sodium versions of canned goods (such as soups, vegetables, and beans). Limit sugar · Limit drinks and foods with added sugar. These include candy, desserts, and soda pop. Limit alcohol · Limit alcohol to no more than 2 drinks a day for men and 1 drink a day for women. Too much alcohol can cause health problems. When should you call for help? Watch closely for changes in your health, and be sure to contact your doctor if: 
· You would like help planning heart-healthy meals. Where can you learn more? Go to http://germán-rishi.info/. Enter V137 in the search box to learn more about \"Heart-Healthy Diet: Care Instructions. \" Current as of: January 27, 2016 Content Version: 11.2 © 2276-4148 [employer]. Care instructions adapted under license by [employer] (which disclaims liability or warranty for this information). If you have questions about a medical condition or this instruction, always ask your healthcare professional. [place] [address] any warranty or liability for your use of this information. Advance Directives: Care Instructions Your Care Instructions An advance directive is a legal way to state your wishes at the end of your life. It tells your family and your doctor what to do if you can no longer say what you want. There are two main types of advance directives. You can change them any time that your wishes change. · A living will tells your family and your doctor your wishes about life support and other treatment. · A durable power of  for health care lets you name a person to make treatment decisions for you when you can't speak for yourself. This person is called a health care agent. If you do not have an advance directive, decisions about your medical care may be made by a doctor or a  who doesn't know you. It may help to think of an advance directive as a gift to the people who care for you. If you have one, they won't have to make tough decisions by themselves. Follow-up care is a key part of your treatment and safety. Be sure to make and go to all appointments, and call your doctor if you are having problems. It's also a good idea to know your test results and keep a list of the medicines you take. How can you care for yourself at home? · Discuss your wishes with your loved ones and your doctor. This way, there are no surprises. · Many states have a unique form. Or you might use a universal form that has been approved by many states. This kind of form can sometimes be completed and stored online. Your electronic copy will then be available wherever you have a connection to the Internet. In most cases, doctors will respect your wishes even if you have a form from a different state. · You don't need a  to do an advance directive. But you may want to get legal advice. · Think about these questions when you prepare an advance directive: ¨ Who do you want to make decisions about your medical care if you are not able to? Many people choose a family member or close friend. ¨ Do you know enough about life support methods that might be used? If not, talk to your doctor so you understand. ¨ What are you most afraid of that might happen? You might be afraid of having pain, losing your independence, or being kept alive by machines. ¨ Where would you prefer to die? Choices include your home, a hospital, or a nursing home. ¨ Would you like to have information about hospice care to support you and your family? ¨ Do you want to donate organs when you die? ¨ Do you want certain Caodaism practices performed before you die? If so, put your wishes in the advance directive. · Read your advance directive every year, and make changes as needed. When should you call for help? Be sure to contact your doctor if you have any questions. Where can you learn more? Go to [email or web address]/. Enter R264 in the search box to learn more about \"Advance Directives: Care Instructions. \" Current as of: November 17, 2016 Content Version: 11.2 © 2797-1355 Solarcentury. Care instructions adapted under license by [employer] (which disclaims liability or warranty for this information). If you have questions about a medical condition or this instruction, always ask your healthcare professional. [place] [address] any warranty or liability for your use of this information. Introducing [facility] & HEALTH SERVICES! Dear Randi Britton: Thank you for requesting a Tego account. Our records indicate that you have previously registered for a Tego account but its currently inactive. Please call our Tego support line at [phone]. Additional Information If you have questions, please visit the Frequently Asked Questions section of the Tego website at https://Advanced [employer]ation [employer]. [employer] Dishcrawl/Over 40 Femaleshart/. Remember, Tego is NOT to be used for urgent needs. For medical emergencies, dial 911. Now available from your iPhone and Android! Please provide this summary of care documentation to your next provider. Your primary care clinician is listed as Surya Spencer. If you have any questions after today's visit, please call [phone].

## 2017-04-05 NOTE — PROGRESS NOTES
This is a Subsequent Medicare Annual Wellness Visit providing Personalized Prevention Plan Services (PPPS) (Performed 12 months after initial AWV and PPPS )    I have reviewed the patient's medical history in detail and updated the computerized patient record. History     Present for CPE, last Complete Physical exam was few yrs ago ,  Up todate w/ all vaccination except for shingles, last tetanus vaccine was in < 5yrs ago    . Last psa exam was normal and was in few yrs ago      last colonoscopy was normal and was many yrs Ago,   No past surgical hx,  last bone dexa scan was abnormal and was  2yrs Ago,      No family hx of breast cancer   no family hx of prostate cancer   no family hx of colon cancer, parent  of old age,not sexaully active , ++physically active,  compliant w/ meds, +++Rf needed for today for her meds.      Screening for fall   Medications causing fall and the risk for future fall screened specially if the continuation of some of the current meds continues is addressed,  Depression    the depression at this age addressed pt with improvig interests and enjoy to do things, not anxious not depressed, not wanted to heart self or others  BMI for his age  the  abnl BMI r/w'd and information given, bp was screened for abnormality,  The colon and prostate ca screening   the colon ca screening uptodate, including his prostate cancer screening is uptodate as well,  nl ldl in the most recent lab was admired, the daily use of 81mg aspirin emphasized,       Past Medical History:   Diagnosis Date    Acid reflux     Arthritis     right  left knees    Back pain, chronic 2014    GERD (gastroesophageal reflux disease)     Hip pain, left 2014    Hypercholesterolemia     Hypertension     Need for varicella vaccine 2014    Rash of entire body 9/15/2016    Restless leg syndrome       Past Surgical History:   Procedure Laterality Date    ABDOMEN SURGERY PROC UNLISTED  [address] gall bladder    ABDOMEN SURGERY PROC UNLISTED  15 yrs ago    right hernia repair    HX HEENT      EYE SURGERY 40 YRS AGO.  HX ORTHOPAEDIC      fluid removed both knees    HX ORTHOPAEDIC      bilateral knee replacements     Current Outpatient Prescriptions   Medication Sig Dispense Refill    VITAMIN B-12 1,000 mcg tablet TAKE 1 TABLET BY MOUTH EVERY DAY  6    polyethylene glycol (MIRALAX) 17 gram/dose powder DISSOLVE 1 SCOOP IN THE WATER ONCE A DAY  2    ergocalciferol (ERGOCALCIFEROL) 50,000 unit capsule TAKE ONE CAPSULE BY MOUTH EVERY WEEK. Pt needs to schedule an office visit for further refills 4 Cap 0    cyanocobalamin 1,000 mcg tablet TAKE 1 TABLET BY MOUTH EVERY DAY 30 Tab 6    loratadine (CLARITIN) 10 mg tablet Take 1 Tab by mouth daily. 30 Tab 4    lisinopril (PRINIVIL, ZESTRIL) 5 mg tablet TAKE 1 TAB BY MOUTH DAILY. 30 Tab 6    finasteride (PROSCAR) 5 mg tablet Take 1 Tab by mouth daily. Indications: SYMPTOMATIC BENIGN PROSTATIC HYPERPLASIA 30 Tab 6    chlorpheniramine (ALLERGY RELIEF,CHLORPHENIRAMN,) 4 mg tablet TAKE 1 TABLET BY MOUTH TWICE A DAY AS NEEDED FOR ALLERGIES OR RHINITIS 60 Tab 1    gabapentin (NEURONTIN) 800 mg tablet TAKE 1 TABLET BY MOUTH 3 TIMES A DAY 90 Tab 4    permethrin (ACTICIN) 5 % topical cream apply sparingly as directed, apply sparingly from the forehead hairlines all over the entire body including the soles of the feet and between the toes, leave on for 12 hrs then rinse off, repeat the same in 3 days 60 g 0    ranitidine (ZANTAC) 150 mg tablet Take 150 mg by mouth two (2) times a day.  fluticasone (FLONASE) 50 mcg/actuation nasal spray 2 puffs daily  Indications: ALLERGIC CONJUNCTIVITIS, ALLERGIC RHINITIS 1 Bottle 6    capsaicin 0.1 % topical cream Apply  to affected area three (3) times daily. 43 g 5    triamcinolone (ARISTOCORT) 0.5 % topical cream Apply  to affected area two (2) times a day.  use thin layer 45 g 0    olopatadine (PATANOL) 0.1 % ophthalmic solution Administer 2 Drops to both eyes two (2) times a day. 10 mL 11    aspirin 81 mg tablet Take 1 Tab by mouth daily. 30 Tab 6    tamsulosin (FLOMAX) 0.4 mg capsule TAKE 2 CAPS BY MOUTH DAILY. 60 Cap 6    famotidine (PEPCID) 20 mg tablet Take 1 Tab by mouth two (2) times a day. 60 Tab 0    ondansetron (ZOFRAN ODT) 4 mg disintegrating tablet Take 1 Tab by mouth every eight (8) hours as needed for Nausea. 10 Tab 0    psyllium (METAMUCIL) packet Take 1 Packet by mouth two (2) times a day. Hold for Loose bowl Movement 60 Packet 11    HYDROcodone-ibuprofen (VICOPROFEN) 7.5-200 mg per tablet Take 1 Tab by mouth nightly as needed for Pain. 27 Tab 0     No Known Allergies  Family History   Problem Relation Age of Onset    Heart Disease Sister      Social History   Substance Use Topics    Smoking status: Former Smoker     Years: 0.00     Quit date: 4/28/1970    Smokeless tobacco: Never Used    Alcohol use Yes      Comment: OCCASSIONALLY     Patient Active Problem List   Diagnosis Code    Left knee DJD M17.12    HTN (hypertension) I10    Hypercholesterolemia E78.00    Urinary frequency R35.0    Insomnia G47.00    Kidney function abnormal N28.9    Elevated PSA measurement R97.20    B12 deficiency E53.8    Vitamin D deficiency E55.9    AR (allergic rhinitis) J30.9    Acute bronchitis J20.9    Special screening for osteoporosis Z13.820    Tingling sensation R20.2    Hip pain, left M25.552    Back pain, chronic M54.9, G89.29    Need for varicella vaccine Z23    Chronic constipation K59.09    Rash of entire body R21     Needs refill for Vitamin-D, low salt diet, does cut his own grass, no ches tpain,         Depression Risk Factor Screening:     PHQ 2 / 9, over the last two weeks 9/15/2016   Little interest or pleasure in doing things Not at all   Feeling down, depressed or hopeless Not at all   Total Score PHQ 2 0     Alcohol Risk Factor Screening:    On any occasion during the past 3 months, have you had more than 4 drinks containing alcohol? No    Do you average more than 14 drinks per week? No      Functional Ability and Level of Safety:     Hearing Loss   moderate    Activities of Daily Living   Self-care. Requires assistance with: no ADLs    Fall Risk     Fall Risk Assessment, last 12 mths 9/15/2016   Able to walk? Yes   Fall in past 12 months? No     Abuse Screen   Patient is not abused    Review of Systems   Constitutional: negative  Eyes: negative  Ears, nose, mouth, throat, and face: positive for hearing loss  Respiratory: negative  Cardiovascular: negative  Gastrointestinal: negative  Genitourinary:negative  Integument/breast: negative  Hematologic/lymphatic: negative  Musculoskeletal:negative  Neurological: negative  Behavioral/Psych: negative  Endocrine: negative  Allergic/Immunologic: negative    Physical Examination     Evaluation of Cognitive Function:  Mood/affect:  happy  Appearance: age appropriate, well dressed and within normal Limits  Family member/caregiver input: daughter    Visit Vitals    /80 (BP 1 Location: Left arm, BP Patient Position: At rest)    Pulse 78    Temp 97.9 °F (36.6 °C) (Oral)    Resp 14    Ht 6' (1.829 m)    Wt 206 lb 6.4 oz (93.6 kg)    SpO2 95%    BMI 27.99 kg/m2     General:  Alert, cooperative, no distress, appears stated age. Head:  Normocephalic, without obvious abnormality, atraumatic. Eyes:  Conjunctivae/corneas clear. PERRL, EOMs intact. Fundi benign   Ears:  Normal TMs and external ear canals both ears. Nose: Nares normal. Septum midline. Mucosa normal. No drainage or sinus tenderness. Throat: Lips, mucosa, and tongue normal. Teeth and gums normal.   Neck: Supple, symmetrical, trachea midline, no adenopathy, thyroid: no enlargement/tenderness/nodules, no carotid bruit and no JVD. Back:   Symmetric, no curvature. ROM normal. No CVA tenderness. Lungs:   Clear to auscultation bilaterally.    Chest wall:  No tenderness or deformity. Heart:  Regular rate and rhythm, S1, S2 normal, no murmur, click, rub or gallop. Abdomen:   Soft, non-tender. Bowel sounds normal. No masses,  No organomegaly. Genitalia:  Normal male without lesion, discharge or tenderness. Rectal:  Normal tone, normal prostate, no masses or tenderness  Guaiac negative stool. Extremities: Extremities normal, atraumatic, no cyanosis or edema. Pulses: 2+ and symmetric all extremities. Skin: Skin color, texture, turgor normal. No rashes or lesions   Lymph nodes: Cervical, supraclavicular, and axillary nodes normal.   Neurologic: CNII-XII intact. Normal strength, sensation and reflexes throughout. Patient Care Team:  Yara Glover MD as PCP - [facility])    Advice/Referrals/Counseling   Education and counseling provided:    Are appropriate based on today's review and evaluation  End-of-Life planning (with patient's consent): Initial ACP discussion, pt wants the Brother [id number] as SDM,  Pneumococcal Vaccuptodateine, uptodate  Influenza Vaccine, uptodate  Hepatitis B Vaccine, declined  Prostate cancer screening tests not indicated  Colorectal cancer screening tests, ordered today  Cardiovascular screening blood test, addressed  Bone mass measurement (DEXA), notordered await for approval  Screening for glaucoma, done  Diabetes screening test, done today, no hx of it  Medical nutrition therapy for individuals with diabetes or renal disease, addressed info given  Diabetes outpatient self-management training services, informed      Assessment/Plan   Karlie Vidales was seen today for annual wellness visit.     Diagnoses and all orders for this visit:    Routine general medical examination at a health care facility  -     CBC W/O DIFF    Advance directive discussed with patient  -     ADVANCE CARE PLANNING FIRST 27 MINS    Screening for alcoholism    Encounter for immunization  -     varicella zoster vacine live (VARICELLA-ZOSTER VACINE LIVE) 19,400 unit/0.65 mL susr injection; 1 Vial by SubCUTAneous route once for 1 dose. Advanced directives, counseling/discussion    DNR (do not resuscitate)    Essential hypertension  -     CBC W/O DIFF  -     METABOLIC PANEL, COMPREHENSIVE  -     TSH 3RD GENERATION  -     CHOLESTEROL, TOTAL  -     CHOLESTEROL, HDL    Hypercholesterolemia  -     CBC W/O DIFF  -     METABOLIC PANEL, COMPREHENSIVE  -     TSH 3RD GENERATION  -     CHOLESTEROL, TOTAL  -     CHOLESTEROL, HDL    Decreased hearing of both ears  -     REFERRAL TO AUDIOLOGY  -     CBC W/O DIFF  -     METABOLIC PANEL, COMPREHENSIVE  -     TSH 3RD GENERATION  -     CHOLESTEROL, TOTAL  -     CHOLESTEROL, HDL    Encounter for Hemoccult screening  -     OCCULT BLOOD, IMMUNOASSAY (FIT)    Other orders  -     calcium-cholecalciferol, D3, (CALTRATE 600+D) tablet; Take 1 Tab by mouth two (2) times a day.     HTN controlled, no change of bp meds at this time,  Discussed sodium restriction, high k rich diet,  maintaining ideal body weight and regular exercise program such as daily walking 30 min perday 4-5 times per week,  medication compliance advised, was told to call back for rfs or of any concern      The continuity of the care  Pt was told if for any reason, the pt's future Office appointments and the concerns not addressed, pt should obtain the name of the corresponding tel representatives and call us back or send us a letter for future investigation, pt at this visit, is physically functional with gait which is nl and nicely independent and walks w/out mobility device and, in addition mentaly is functional,  very Alert and oriented ,

## 2017-06-13 PROBLEM — K21.9 GASTROESOPHAGEAL REFLUX DISEASE WITHOUT ESOPHAGITIS: Status: ACTIVE | Noted: 2017-01-01

## 2017-06-13 NOTE — MR AVS SNAPSHOT
Visit Information Date & Time Provider Department Dept. Phone Encounter #  
 6/13/2017  8:15 AM MD Alexys Simental OFFICE-ANNEX [phone] [id number] Follow-up Instructions Return in about 3 months (around 9/13/2017), or if symptoms worsen or fail to improve. Your Appointments 7/3/2017  9:30 AM  
ROUTINE CARE with MD Alexys Simental OFFICE-[facility] ([facility]) Appt Note: ulcer [address] [place] [phone]  
[id number] [phone]  
  
    
 10/5/2017  3:00 PM  
ROUTINE CARE with Beto Morales MD  
[facility] OFFICE-ANN[facility] ([facility]) Appt Note: 6 mnth fu  
 [address] [place] [address]  
[phone] Upcoming Health Maintenance Date Due  
 GLAUCOMA SCREENING Q2Y 3/13/2016 INFLUENZA AGE 9 TO ADULT 8/1/2017 MEDICARE YEARLY EXAM 4/6/2018 DTaP/Tdap/Td series (2 - Td) 4/11/2024 Allergies as of 6/13/2017  Review Complete On: 6/13/2017 By: Shilpa Shoemaker LPN No Known Allergies Current Immunizations  Reviewed on 8/6/2015 Name Date Influenza High Dose Vaccine PF 9/15/2016, 12/7/2015 Influenza Vaccine 4/11/2014 Influenza Vaccine PF 10/9/2013 Pneumococcal Conjugate (PCV-13) 8/6/2015 Pneumococcal Polysaccharide (PPSV-23) 4/11/2014 Tdap 4/11/2014 Not reviewed this visit You Were Diagnosed With   
  
 Codes Comments Lower abdominal pain    -  Primary ICD-10-CM: R10.30 ICD-9-CM: 789.09 Screening for glaucoma     ICD-10-CM: Z13.5 ICD-9-CM: V80.1 Gastroesophageal reflux disease without esophagitis     ICD-10-CM: K21.9 ICD-9-CM: 530.81 Vitals BP Pulse Temp Resp Height(growth percentile) Weight(growth percentile)  108/78 (BP 1 Location: Left arm, BP Patient Position: At rest) 91 97.4 °F (36.3 °C) (Oral) 14 6' (1.829 m) 196 lb 12.8 oz (89.3 kg) SpO2 BMI Smoking Status 96% 26.69 kg/m2 Former Smoker Vitals History BMI and BSA Data Body Mass Index Body Surface Area  
 26.69 kg/m 2 2.13 m 2 Preferred Pharmacy Pharmacy Name Phone [id number] [facility] [phone] Your Updated Medication List  
  
   
This list is accurate as of: 6/13/17  8:49 AM.  Always use your most recent med list.  
  
  
  
  
 aspirin 81 mg tablet Take 1 Tab by mouth daily. calcium-cholecalciferol (D3) tablet Commonly known as:  CALTRATE 600+D Take 1 Tab by mouth two (2) times a day. capsaicin 0.1 % topical cream  
Commonly known as:  CAPZASIN-HP Apply  to affected area three (3) times daily. chlorpheniramine 4 mg tablet Commonly known as:  ALLERGY RELIEF(CHLORPHENIRAMN) TAKE 1 TABLET BY MOUTH TWICE A DAY AS NEEDED FOR ALLERGIES OR RHINITIS * cyanocobalamin 1,000 mcg tablet TAKE 1 TABLET BY MOUTH EVERY DAY  
  
 * VITAMIN B-12 1,000 mcg tablet Generic drug:  cyanocobalamin ER  
TAKE 1 TABLET BY MOUTH EVERY DAY  
  
 famotidine 20 mg tablet Commonly known as:  PEPCID Take 1 Tab by mouth two (2) times a day. finasteride 5 mg tablet Commonly known as:  PROSCAR Take 1 Tab by mouth daily. Indications: SYMPTOMATIC BENIGN PROSTATIC HYPERPLASIA  
  
 fluticasone 50 mcg/actuation nasal spray Commonly known as:  Romel Bumpers 2 puffs daily  Indications: ALLERGIC CONJUNCTIVITIS, ALLERGIC RHINITIS  
  
 gabapentin 800 mg tablet Commonly known as:  NEURONTIN  
TAKE 1 TABLET BY MOUTH 3 TIMES A DAY HYDROcodone-ibuprofen 7.5-200 mg per tablet Commonly known as:  Lubertha Goods Take 1 Tab by mouth nightly as needed for Pain.  
  
 hydrocortisone 2.5 % rectal cream  
Commonly known as:  ANUSOL-HC Insert  into rectum four (4) times daily. lisinopril 5 mg tablet Commonly known as:  PRINIVIL, ZESTRIL  
TAKE 1 TAB BY MOUTH DAILY. loratadine 10 mg tablet Commonly known as:  Manual Me Take 1 Tab by mouth daily. olopatadine 0.1 % ophthalmic solution Commonly known as:  PATANOL Administer 2 Drops to both eyes two (2) times a day. ondansetron 4 mg disintegrating tablet Commonly known as:  ZOFRAN ODT Take 1 Tab by mouth every eight (8) hours as needed for Nausea. pantoprazole 40 mg tablet Commonly known as:  PROTONIX Take 1 Tab by mouth daily. One tab 30min before meal twice daily  Indications: Duodenal Ulcer, gastroesophageal reflux disease, HEARTBURN, Upper GI Bleed  
  
 permethrin 5 % topical cream  
Commonly known as:  ACTICIN  
apply sparingly as directed, apply sparingly from the forehead hairlines all over the entire body including the soles of the feet and between the toes, leave on for 12 hrs then rinse off, repeat the same in 3 days  
  
 polyethylene glycol 17 gram/dose powder Commonly known as:  Baltazar Ejz DISSOLVE 1 SCOOP IN THE WATER ONCE A DAY  
  
 psyllium packet Commonly known as:  METAMUCIL Take 1 Packet by mouth two (2) times a day. Hold for Loose bowl Movement  
  
 raNITIdine 150 mg tablet Commonly known as:  ZANTAC Take 150 mg by mouth two (2) times a day. sucralfate 100 mg/mL suspension Commonly known as:  Marney Slot Take 5 mL by mouth four (4) times daily. Indications: Duodenal Ulcer  
  
 tamsulosin 0.4 mg capsule Commonly known as:  FLOMAX TAKE 2 CAPS BY MOUTH DAILY. * Notice: This list has 2 medication(s) that are the same as other medications prescribed for you. Read the directions carefully, and ask your doctor or other care provider to review them with you. Prescriptions Printed Refills  
 pantoprazole (PROTONIX) 40 mg tablet 0 Sig: Take 1 Tab by mouth daily.  One tab 30min before meal twice daily Indications: Duodenal Ulcer, gastroesophageal reflux disease, HEARTBURN, Upper GI Bleed Class: Print Route: Oral  
 sucralfate (CARAFATE) 100 mg/mL suspension 2 Sig: Take 5 mL by mouth four (4) times daily. Indications: Duodenal Ulcer Class: Print Route: Oral  
  
Prescriptions Sent to Pharmacy Refills  
 hydrocortisone (ANUSOL-HC) 2.5 % rectal cream 6 Sig: Insert  into rectum four (4) times daily. Class: Normal  
 Pharmacy: [address] #: [phone] Route: Rectal  
  
We Performed the Following AMYLASE [id number] CPT(R)] CBC W/O DIFF [35331 CPT(R)] LIPASE [id number] CPT(R)] METABOLIC PANEL, COMPREHENSIVE [68560 CPT(R)] REFERRAL TO GASTROENTEROLOGY [CUT00 Custom] Comments:  
 Black stools X  3 days. REFERRAL TO OPTOMETRY [id number] Custom] Comments:  
 Please evaluate patient for glaucoma. Follow-up Instructions Return in about 3 months (around 9/13/2017), or if symptoms worsen or fail to improve. Referral Information Referral ID Referred By Referred To  
  
 [id number] [facility] [address] [facility] [address] [place], [address] [facility] Visits Status Start Date End Date 1 New Request 6/13/17 6/13/18 If your referral has a status of pending review or denied, additional information will be sent to support the outcome of this decision. Referral ID Referred By Referred To  
 [id number] GURPREET SONG MD  
   [address]  
   Gloria Busch, [address] Phone: [phone] Fax: [phone] Visits Status Start Date End Date 1 New Request 6/13/17 6/13/18 If your referral has a status of pending review or denied, additional information will be sent to support the outcome of this decision. Introducing [facility] & HEALTH SERVICES! Dear Zuleima Ramirez: Thank you for requesting a Expert Planet account. Our records indicate that you have previously registered for a Expert Planet account but its currently inactive. Please call our Expert Planet support line at [phone]. Additional Information If you have questions, please visit the Frequently Asked Questions section of the Expert Planet website at [email or web address] Remember, Expert Planet is NOT to be used for urgent needs. For medical emergencies, dial 911. Now available from your iPhone and Android! Please provide this summary of care documentation to your next provider. Your primary care clinician is listed as Anton Dorado. If you have any questions after today's visit, please call [phone].

## 2017-06-13 NOTE — PROGRESS NOTES
[address]      Name and  verified      Chief Complaint   Patient presents with    Melena     X 3 days patient stated    Abdominal Pain     X 3 days       Health Maintenance reviewed-discussed with patient.

## 2017-06-13 NOTE — PROGRESS NOTES
HISTORY OF PRESENT ILLNESS  Kuldeep Farias Sr. is a 80 y.o. male. HPI   Present with pmhx of htn, djd, decrease hearing b/l bph, no hx of CAD, currently on Zantac, not taking NSAID,s with the c/o of black stool, no hx of ETOH, stating that he had the same problems in 1950's,  stating that he was experiencing some epigastric abd discomfort recently for which he started taking Pepto-bismol, stating that he has not taking it for the last couple of days but still having black stool and is not going away,         Current Outpatient Prescriptions   Medication Sig Dispense Refill    chlorpheniramine (ALLERGY RELIEF,CHLORPHENIRAMN,) 4 mg tablet TAKE 1 TABLET BY MOUTH TWICE A DAY AS NEEDED FOR ALLERGIES OR RHINITIS 60 Tab 1    gabapentin (NEURONTIN) 800 mg tablet TAKE 1 TABLET BY MOUTH 3 TIMES A DAY 90 Tab 4    VITAMIN B-12 1,000 mcg tablet TAKE 1 TABLET BY MOUTH EVERY DAY  6    polyethylene glycol (MIRALAX) 17 gram/dose powder DISSOLVE 1 SCOOP IN THE WATER ONCE A DAY  2    calcium-cholecalciferol, D3, (CALTRATE 600+D) tablet Take 1 Tab by mouth two (2) times a day. 60 Tab 11    cyanocobalamin 1,000 mcg tablet TAKE 1 TABLET BY MOUTH EVERY DAY 30 Tab 6    tamsulosin (FLOMAX) 0.4 mg capsule TAKE 2 CAPS BY MOUTH DAILY. 60 Cap 6    loratadine (CLARITIN) 10 mg tablet Take 1 Tab by mouth daily. 30 Tab 4    lisinopril (PRINIVIL, ZESTRIL) 5 mg tablet TAKE 1 TAB BY MOUTH DAILY. 30 Tab 6    finasteride (PROSCAR) 5 mg tablet Take 1 Tab by mouth daily. Indications: SYMPTOMATIC BENIGN PROSTATIC HYPERPLASIA 30 Tab 6    permethrin (ACTICIN) 5 % topical cream apply sparingly as directed, apply sparingly from the forehead hairlines all over the entire body including the soles of the feet and between the toes, leave on for 12 hrs then rinse off, repeat the same in 3 days 60 g 0    famotidine (PEPCID) 20 mg tablet Take 1 Tab by mouth two (2) times a day.  60 Tab 0    ondansetron (ZOFRAN ODT) 4 mg disintegrating tablet Take 1 Tab by mouth every eight (8) hours as needed for Nausea. 10 Tab 0    ranitidine (ZANTAC) 150 mg tablet Take 150 mg by mouth two (2) times a day.  fluticasone (FLONASE) 50 mcg/actuation nasal spray 2 puffs daily  Indications: ALLERGIC CONJUNCTIVITIS, ALLERGIC RHINITIS 1 Bottle 6    psyllium (METAMUCIL) packet Take 1 Packet by mouth two (2) times a day. Hold for Loose bowl Movement 60 Packet 11    HYDROcodone-ibuprofen (VICOPROFEN) 7.5-200 mg per tablet Take 1 Tab by mouth nightly as needed for Pain. 30 Tab 0    capsaicin 0.1 % topical cream Apply  to affected area three (3) times daily. 43 g 5    triamcinolone (ARISTOCORT) 0.5 % topical cream Apply  to affected area two (2) times a day. use thin layer 45 g 0    olopatadine (PATANOL) 0.1 % ophthalmic solution Administer 2 Drops to both eyes two (2) times a day. 10 mL 11    aspirin 81 mg tablet Take 1 Tab by mouth daily. 30 Tab 6     No Known Allergies  Past Medical History:   Diagnosis Date    Acid reflux     Arthritis     right  left knees    Back pain, chronic 4/11/2014    Decreased hearing of both ears 4/5/2017    DNR (do not resuscitate) 4/5/2017    GERD (gastroesophageal reflux disease)     Hip pain, left 4/11/2014    Hypercholesterolemia     Hypertension     Need for varicella vaccine 4/11/2014    Rash of entire body 9/15/2016    Restless leg syndrome      Past Surgical History:   Procedure Laterality Date    ABDOMEN SURGERY PROC UNLISTED  1980's    gall bladder    ABDOMEN SURGERY PROC UNLISTED  15 yrs ago    right hernia repair    HX HEENT      EYE SURGERY 40 YRS AGO.     HX ORTHOPAEDIC      fluid removed both knees    HX ORTHOPAEDIC      bilateral knee replacements     Family History   Problem Relation Age of Onset    Heart Disease Sister      Social History   Substance Use Topics    Smoking status: Former Smoker     Years: 0.00     Quit date: 4/28/1970    Smokeless tobacco: Never Used    Alcohol use Yes      Comment: OCCASSIONALLY Lab Results  Component Value Date/Time   WBC 6.9 04/05/2017 03:43 PM   HGB 14.0 04/05/2017 03:43 PM   HCT 40.7 04/05/2017 03:43 PM   PLATELET 658 40/97/4349 03:43 PM   MCV 96 04/05/2017 03:43 PM     Lab Results  Component Value Date/Time   Hemoglobin A1c 5.7 08/06/2015 01:28 PM   Hemoglobin A1c 5.6 04/11/2014 12:20 PM   Hemoglobin A1c 5.3 12/01/2011 01:13 PM   Glucose 85 04/05/2017 03:43 PM   LDL, calculated 154 09/16/2016 11:03 AM   Creatinine 1.64 04/05/2017 03:43 PM      Lab Results  Component Value Date/Time   GFR est non-AA 38 04/05/2017 03:43 PM   GFR est AA 43 04/05/2017 03:43 PM   Creatinine 1.64 04/05/2017 03:43 PM   BUN 25 04/05/2017 03:43 PM   Sodium 140 04/05/2017 03:43 PM   Potassium 4.5 04/05/2017 03:43 PM   Chloride 99 04/05/2017 03:43 PM   CO2 25 04/05/2017 03:43 PM        Review of Systems   Constitutional: Negative for chills and fever. HENT: Negative for ear pain and nosebleeds. Eyes: Negative for blurred vision, pain and discharge. Respiratory: Negative for shortness of breath. Cardiovascular: Negative for chest pain and leg swelling. Gastrointestinal: Positive for abdominal pain and heartburn. Negative for constipation, diarrhea, nausea and vomiting. Genitourinary: Negative for frequency. Musculoskeletal: Negative for joint pain. Skin: Negative for itching and rash. Neurological: Negative for headaches. Psychiatric/Behavioral: Negative for depression. The patient is not nervous/anxious. Physical Exam   Constitutional: He is oriented to person, place, and time. He appears well-developed and well-nourished. HENT:   Head: Normocephalic and atraumatic. Mouth/Throat: No oropharyngeal exudate. Eyes: Conjunctivae and EOM are normal.   Neck: Normal range of motion. Neck supple. Cardiovascular: Normal rate, regular rhythm and normal heart sounds. No murmur heard. Pulmonary/Chest: Effort normal and breath sounds normal. No respiratory distress.    Abdominal: Soft. Bowel sounds are normal. He exhibits no distension. There is no rebound. Musculoskeletal: He exhibits no edema or tenderness. Neurological: He is alert and oriented to person, place, and time. Skin: Skin is warm. No erythema. Psychiatric: He has a normal mood and affect. His behavior is normal.   Nursing note and vitals reviewed. ASSESSMENT and PLAN  Chemo Franco was seen today for melena and abdominal pain. Diagnoses and all orders for this visit:    Lower abdominal pain  -     REFERRAL TO GASTROENTEROLOGY  -     REFERRAL TO OPTOMETRY  -     CBC W/O DIFF  -     pantoprazole (PROTONIX) 40 mg tablet; Take 1 Tab by mouth daily. One tab 30min before meal twice daily  Indications: Duodenal Ulcer, gastroesophageal reflux disease, HEARTBURN, Upper GI Bleed  -     sucralfate (CARAFATE) 100 mg/mL suspension; Take 5 mL by mouth four (4) times daily. Indications: Duodenal Ulcer  -     hydrocortisone (ANUSOL-HC) 2.5 % rectal cream; Insert  into rectum four (4) times daily.  -     AMYLASE  -     LIPASE  -     METABOLIC PANEL, COMPREHENSIVE    Screening for glaucoma  -     REFERRAL TO GASTROENTEROLOGY  -     REFERRAL TO OPTOMETRY  -     CBC W/O DIFF  -     pantoprazole (PROTONIX) 40 mg tablet; Take 1 Tab by mouth daily. One tab 30min before meal twice daily  Indications: Duodenal Ulcer, gastroesophageal reflux disease, HEARTBURN, Upper GI Bleed  -     sucralfate (CARAFATE) 100 mg/mL suspension; Take 5 mL by mouth four (4) times daily. Indications: Duodenal Ulcer  -     hydrocortisone (ANUSOL-HC) 2.5 % rectal cream; Insert  into rectum four (4) times daily.  -     AMYLASE  -     LIPASE  -     METABOLIC PANEL, COMPREHENSIVE    Gastroesophageal reflux disease without esophagitis  -     REFERRAL TO GASTROENTEROLOGY  -     REFERRAL TO OPTOMETRY  -     CBC W/O DIFF  -     pantoprazole (PROTONIX) 40 mg tablet; Take 1 Tab by mouth daily.  One tab 30min before meal twice daily  Indications: Duodenal Ulcer, gastroesophageal reflux disease, HEARTBURN, Upper GI Bleed  -     sucralfate (CARAFATE) 100 mg/mL suspension; Take 5 mL by mouth four (4) times daily. Indications: Duodenal Ulcer  -     hydrocortisone (ANUSOL-HC) 2.5 % rectal cream; Insert  into rectum four (4) times daily.  -     AMYLASE  -     LIPASE  -     METABOLIC PANEL, COMPREHENSIVE    Other orders  -     CKD REPORT    April results nl cbc, no FIT test, cont with current meds stop Pepto bismol, no Nsaids, no fatty acids, no overeating and laying down,   Anti-reflux measures such as raising the head of the bed, avoiding tight clothing or belts, avoiding eating late at night and not lying down shortly after mealtime, overeating and achieving weight loss,   are discussed. Also advised on avoiding ASA, NSAID's, caffeine,alcohol and tobacco. OTC H2 blockers and/or antacids are often very helpful for PRN use. She was told to alert me if there are persistent symptoms, dysphagia, weight loss or GI bleeding. meds compliance and side effect advised.

## 2017-06-28 NOTE — IP AVS SNAPSHOT
2700 [facility] [address] [place] [address] 
[phone] Patient: Flor Cross Sr. MRN: [id number] HPU:7/39/2345 You are allergic to the following No active allergies Recent Documentation Smoking Status Former Smoker Emergency Contacts Name Discharge Info Relation Home Work Mobile [facility] DISCHARGE CAREGIVER [3] Daughter [21]   [phone] [facility]  Child [2] [phone] About your hospitalization You were admitted on:  June 28, 2017 You last received care in the:  [facility] ENDOSCOPY You were discharged on:  June 28, 2017 Unit phone number:  [phone] Why you were hospitalized Your primary diagnosis was:  Not on File Providers Seen During Your Hospitalizations Provider Role Specialty Primary office phone Kyaw Tello MD Attending Provider Gastroenterology [phone] Your Primary Care Physician (PCP) Primary Care Physician Office Phone Office Fax Archana Jacobson [phone] Follow-up Information None Your Appointments Thursday July 06, 2017  1:30 PM EDT  
CT ABD PELV WO CONT with [facility] CT ER 1 [facility] RAD CT ([address]) [address]  
[phone] NON-CONTRAST STUDY: 1. The patient should not eat solid food four hours before the appointment but should be encouraged to drink clear liquids. 2. Bring any non [facility] facility films/images pertaining to the area of interest with you on the day of appointment. 3. Check in at registration at least 30 minutes before appt time unless you were instructed to do otherwise. 4. If you have to drink oral contrast please pick it up any weekday prior to your appointment, if you cannot please check in 2 hrs  before appt time.   
  
    
Patient should report to outpatient registration (Medical Office Building [facility]), 30 minutes prior to the appointment time. (NOT FOR MRI)  After the time of 4:30pm, please go to Admitting (Main Entrance, 1st office on the left) to be registered until 7:00pm (closing). After 7pm pt goes to ER registration On Saturday during the hours of 7:00am - 3:00pm patients can be registered for outpatient services at Admitting. After 3pm go to ER registration. On Sunday pt goes to ER registration only. Current Discharge Medication List  
  
CONTINUE these medications which have NOT CHANGED Dose & Instructions Dispensing Information Comments Morning Noon Evening Bedtime  
 aspirin 81 mg tablet Your last dose was: Your next dose is:    
   
   
 Dose:  81 mg Take 1 Tab by mouth daily. Quantity:  30 Tab Refills:  6  
     
   
   
   
  
 calcium-cholecalciferol (D3) tablet Commonly known as:  CALTRATE 600+D Your last dose was: Your next dose is:    
   
   
 Dose:  1 Tab Take 1 Tab by mouth two (2) times a day. Quantity:  60 Tab Refills:  11  
     
   
   
   
  
 capsaicin 0.1 % topical cream  
Commonly known as:  CAPZASIN-HP Your last dose was: Your next dose is:    
   
   
 Apply  to affected area three (3) times daily. Quantity:  43 g Refills:  5  
     
   
   
   
  
 chlorpheniramine 4 mg tablet Commonly known as:  ALLERGY RELIEF(CHLORPHENIRAMN) Your last dose was: Your next dose is: TAKE 1 TABLET BY MOUTH TWICE A DAY AS NEEDED FOR ALLERGIES OR RHINITIS Quantity:  60 Tab Refills:  1  
     
   
   
   
  
 * cyanocobalamin 1,000 mcg tablet Your last dose was: Your next dose is: TAKE 1 TABLET BY MOUTH EVERY DAY Quantity:  30 Tab Refills:  6  
     
   
   
   
  
 * VITAMIN B-12 1,000 mcg tablet Generic drug:  cyanocobalamin ER Your last dose was: Your next dose is:  TAKE 1 TABLET BY MOUTH EVERY DAY  
 Refills:  6  
     
   
   
   
  
 famotidine 20 mg tablet Commonly known as:  PEPCID Your last dose was: Your next dose is:    
   
   
 Dose:  20 mg Take 1 Tab by mouth two (2) times a day. Quantity:  60 Tab Refills:  0  
     
   
   
   
  
 finasteride 5 mg tablet Commonly known as:  PROSCAR Your last dose was: Your next dose is:    
   
   
 Dose:  5 mg Take 1 Tab by mouth daily. Indications: SYMPTOMATIC BENIGN PROSTATIC HYPERPLASIA Quantity:  30 Tab Refills:  6  
     
   
   
   
  
 fluticasone 50 mcg/actuation nasal spray Commonly known as:  Princess Andrew Your last dose was: Your next dose is:    
   
   
 2 puffs daily  Indications: ALLERGIC CONJUNCTIVITIS, ALLERGIC RHINITIS Quantity:  1 Bottle Refills:  6  
     
   
   
   
  
 gabapentin 800 mg tablet Commonly known as:  NEURONTIN Your last dose was: Your next dose is: TAKE 1 TABLET BY MOUTH 3 TIMES A DAY Quantity:  90 Tab Refills:  4 HYDROcodone-ibuprofen 7.5-200 mg per tablet Commonly known as:  Thersa half-way Your last dose was: Your next dose is:    
   
   
 Dose:  1 Tab Take 1 Tab by mouth nightly as needed for Pain. Quantity:  30 Tab Refills:  0  
     
   
   
   
  
 hydrocortisone 2.5 % rectal cream  
Commonly known as:  ANUSOL-HC Your last dose was: Your next dose is: Insert  into rectum four (4) times daily. Quantity:  30 g Refills:  6  
     
   
   
   
  
 lisinopril 5 mg tablet Commonly known as:  Minus Salk Your last dose was: Your next dose is: TAKE 1 TAB BY MOUTH DAILY. Quantity:  30 Tab Refills:  6  
     
   
   
   
  
 loratadine 10 mg tablet Commonly known as:  Kevin Chano Your last dose was: Your next dose is:    
   
   
 Dose:  10 mg Take 1 Tab by mouth daily. Quantity:  30 Tab Refills:  4 olopatadine 0.1 % ophthalmic solution Commonly known as:  PATANOL Your last dose was: Your next dose is:    
   
   
 Dose:  2 Drop Administer 2 Drops to both eyes two (2) times a day. Quantity:  10 mL Refills:  11  
     
   
   
   
  
 ondansetron 4 mg disintegrating tablet Commonly known as:  ZOFRAN ODT Your last dose was: Your next dose is:    
   
   
 Dose:  4 mg Take 1 Tab by mouth every eight (8) hours as needed for Nausea. Quantity:  10 Tab Refills:  0  
     
   
   
   
  
 pantoprazole 40 mg tablet Commonly known as:  PROTONIX Your last dose was: Your next dose is:    
   
   
 Dose:  40 mg Take 1 Tab by mouth daily. One tab 30min before meal twice daily  Indications: Duodenal Ulcer, gastroesophageal reflux disease, HEARTBURN, Upper GI Bleed Quantity:  60 Tab Refills:  0  
     
   
   
   
  
 permethrin 5 % topical cream  
Commonly known as:  ACTICIN Your last dose was: Your next dose is:    
   
   
 apply sparingly as directed, apply sparingly from the forehead hairlines all over the entire body including the soles of the feet and between the toes, leave on for 12 hrs then rinse off, repeat the same in 3 days Quantity:  60 g Refills:  0  
     
   
   
   
  
 polyethylene glycol 17 gram/dose powder Commonly known as:  Ulysses Mauricio Your last dose was: Your next dose is:    
   
   
 DISSOLVE 1 SCOOP IN THE WATER ONCE A DAY Refills:  2  
     
   
   
   
  
 psyllium packet Commonly known as:  METAMUCIL Your last dose was: Your next dose is:    
   
   
 Dose:  1 Packet Take 1 Packet by mouth two (2) times a day. Hold for Loose bowl Movement Quantity:  60 Packet Refills:  11  
     
   
   
   
  
 raNITIdine 150 mg tablet Commonly known as:  ZANTAC Your last dose was:     
   
Your next dose is:    
   
   
 Dose:  150 mg  
 Take 150 mg by mouth two (2) times a day. Refills:  0  
     
   
   
   
  
 sucralfate 100 mg/mL suspension Commonly known as:  Camelia Ling Your last dose was: Your next dose is:    
   
   
 Dose:  1 tsp Take 5 mL by mouth four (4) times daily. Indications: Duodenal Ulcer Quantity:  414 mL Refills:  2  
     
   
   
   
  
 tamsulosin 0.4 mg capsule Commonly known as:  FLOMAX Your last dose was: Your next dose is: TAKE 2 CAPS BY MOUTH DAILY. Quantity:  60 Cap Refills:  6  
     
   
   
   
  
 * Notice: This list has 2 medication(s) that are the same as other medications prescribed for you. Read the directions carefully, and ask your doctor or other care provider to review them with you. Discharge Instructions [address] 
217 [facility] [address], [address] 
[phone] DISCHARGE INSTRUCTIONS Layton Sweet 
[id number] 
7/29/1931 DISCOMFORT: 
Sore throat- throat lozenges or warm salt water gargle 
redness at IV site- apply warm compress to area; if redness or soreness persist- contact your physician Gaseous discomfort- walking, belching will help relieve any discomfort You may not operate a vehicle for 12 hours You may not engage in an occupation involving machinery or appliances for rest of today You may not drink alcoholic beverages for at least 12 hours Avoid making any critical decisions for at least 24 hour DIET You may eat and drink after you leave. You may resume your regular diet  however -  remember your colon is empty and a heavy meal will produce gas. Avoid these foods:  vegetables, fried / greasy foods, carbonated drinks ACTIVITY You may resume your normal daily activities Spend the remainder of the day resting -  avoid any strenuous activity. CALL M.D. ANY SIGN OF Increasing pain, nausea, vomiting Abdominal distension (swelling) New increased bleeding (oral or rectal) Fever (chills) Pain in chest area Bloody discharge from nose or mouth Shortness of breath Follow-up Instructions: 
 Call Dr. Michelle Gayle for any questions or problems. If we took a biopsy please call the office within 2 weeks to discuss your                             pathology results. Telephone # [phone] Continue same medications. Discharge Orders None Introducing [facility]! Dear Raffy Zarate: Thank you for requesting a [employer] account. Our records indicate that you have previously registered for a [employer] account but its currently inactive. Please call our [employer] support line at [phone]. Additional Information If you have questions, please visit the Frequently Asked Questions section of the [employer] website at [email or web address] Remember, [employer] is NOT to be used for urgent needs. For medical emergencies, dial 911. Now available from your iPhone and Android! General Information Please provide this summary of care documentation to your next provider. Patient Signature:  ____________________________________________________________ Date:  ____________________________________________________________  
  
April Keene Provider Signature:  ____________________________________________________________ Date:  ____________________________________________________________

## 2017-06-28 NOTE — PROCEDURES
[address]   217 [facility] [address], [address]  [phone]                            NAME:  Annette Javier Sr.   :   1931   MRN:   [id number]     Date/Time:  2017 1:01 PM    Esophagogastroduodenoscopy (EGD) Procedure Note    :  Ashok Ruiz MD    Referring Provider:  Sol Marin MD    Anethesia/Sedation:  MAC anesthesia    Procedure Details     After infom consent was obtained for the procedure, with all risks and benefits of procedure explained the patient was taken to the endoscopy suite and placed in the left lateral decubitus position. Following sequential administration of sedation as per above, the [id number] gastroscope was inserted into the mouth and advanced under direct vision to second portion of the duodenum. A careful inspection was made as the gastroscope was withdrawn, including a retroflexed view of the proximal stomach; findings and interventions are described below. 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 Mass was seen in the gastric cardia and extending into the fundus. Mass was about 3-4 cm in size. There was patchy congestion and erythema in the mucosa of gastric body and antrum  Duodenum/jejunum:normal      Therapies:  biopsy of stomach 1. Gastric antrum and body; 2. Cardia mass    Specimens: biopsy of stomach 1. Gastric antrum and body; 2. Cardia mass           EBL: None    Complications:   None; patient tolerated the procedure well. Impression:    See Postoperative diagnosis above    Recommendations:  -Continue acid suppression. , -Await pathology. , -No NSAIDS, -CT scan abdomen, pelvis and chest    Discharge disposition:  Home in the company of  when able to ambulate    Ashok Ruiz MD

## 2017-06-28 NOTE — ANESTHESIA PREPROCEDURE EVALUATION
Anesthetic History   No history of anesthetic complications            Review of Systems / Medical History  Patient summary reviewed, nursing notes reviewed and pertinent labs reviewed    Pulmonary  Within defined limits                 Neuro/Psych   Within defined limits           Cardiovascular    Hypertension              Exercise tolerance: >4 METS     GI/Hepatic/Renal     GERD          Comments: melana  Hb 13 Endo/Other        Arthritis     Other Findings            Physical Exam    Airway  Mallampati: I  TM Distance: > 6 cm  Neck ROM: normal range of motion   Mouth opening: Normal     Cardiovascular    Rhythm: regular  Rate: normal         Dental         Pulmonary  Breath sounds clear to auscultation               Abdominal  Abdominal exam normal       Other Findings            Anesthetic Plan    ASA: 2  Anesthesia type: MAC          Induction: Intravenous  Anesthetic plan and risks discussed with: Patient

## 2017-06-28 NOTE — ROUTINE PROCESS
Tamiko Rater Sr.  7/29/1931  [id number]    Situation:  Verbal report received from: Logan  Procedure: Procedure(s):  ESOPHAGOGASTRODUODENOSCOPY (EGD)  ESOPHAGOGASTRODUODENAL (EGD) BIOPSY    Background:    Preoperative diagnosis: Melena  Postoperative diagnosis: 1. GE junction mass  2. abnormal mucosa antrum and body    :  Dr. Yifan Kidd  Assistant(s): Endoscopy Technician-1: Pattie aGlloway  Endoscopy RN-1: Alcon Rutledge RN    Specimens:   ID Type Source Tests Collected by Time Destination   1 : abnormal mucosa antrum and body biopsy Preservative   Nirmal Velasco MD 6/28/2017 1253 Pathology   2 : GE junction mass biopsy Preservative   Nirmal Velasco MD 6/28/2017 1254 Pathology     H. Pylori  no    Assessment:  Intra-procedure medications   Anesthesia gave intra-procedure sedation and medications, see anesthesia flow sheet yes    Intravenous fluids: NS@ KVO     Vital signs stable     Abdominal assessment: round and soft     Recommendation:  Discharge patient per MD order.   Return to floor  Family or Friend   Permission to share finding with family or friend yes

## 2017-06-28 NOTE — ANESTHESIA POSTPROCEDURE EVALUATION
Post-Anesthesia Evaluation and Assessment    Patient: Jie Knowles Sr. MRN: [id number]  SSN: [id number]    YOB: 1931  Age: 80 y.o. Sex: male       Cardiovascular Function/Vital Signs  Visit Vitals    /85    Pulse (!) 56    Temp 36.8 °C (98.3 °F)    Resp 9    SpO2 94%       Patient is status post MAC anesthesia for Procedure(s):  ESOPHAGOGASTRODUODENOSCOPY (EGD)  ESOPHAGOGASTRODUODENAL (EGD) BIOPSY. Nausea/Vomiting: None    Postoperative hydration reviewed and adequate. Pain:  Pain Scale 1: Visual (06/28/17 1309)  Pain Intensity 1: 0 (06/28/17 1309)   Managed    Neurological Status: At baseline    Mental Status and Level of Consciousness: Arousable    Pulmonary Status:   O2 Device: Room air (06/28/17 1309)   Adequate oxygenation and airway patent    Complications related to anesthesia: None    Post-anesthesia assessment completed.  No concerns    Signed By: Clara Pyle MD     June 28, 2017

## 2017-06-28 NOTE — PROGRESS NOTES

## 2017-06-28 NOTE — DISCHARGE INSTRUCTIONS
118 [facility].  [address]  [phone]                     [facility] 9881  [id number]  7/29/1931    DISCOMFORT:  Sore throat- throat lozenges or warm salt water gargle  redness at IV site- apply warm compress to area; if redness or soreness persist- contact your physician  Gaseous discomfort- walking, belching will help relieve any discomfort  You may not operate a vehicle for 12 hours  You may not engage in an occupation involving machinery or appliances for rest of today  You may not drink alcoholic beverages for at least 12 hours  Avoid making any critical decisions for at least 24 hour  DIET  You may eat and drink after you leave. You may resume your regular diet - however -  remember your colon is empty and a heavy meal will produce gas. Avoid these foods:  vegetables, fried / greasy foods, carbonated drinks    ACTIVITY  You may resume your normal daily activities   Spend the remainder of the day resting -  avoid any strenuous activity. CALL M.D. ANY SIGN OF   Increasing pain, nausea, vomiting  Abdominal distension (swelling)  New increased bleeding (oral or rectal)  Fever (chills)  Pain in chest area  Bloody discharge from nose or mouth  Shortness of breath    Follow-up Instructions:   Call Dr. Lissett Read for any questions or problems. If we took a biopsy please call the office within 2 weeks to discuss your                             pathology results. Telephone # [phone]        Continue same medications.

## 2017-07-18 NOTE — MR AVS SNAPSHOT
Visit Information Date & Time Provider Department Dept. Phone Encounter #  
 7/18/2017  3:00 PM MD Urbano Cummings [id number] [phone] [id number] Follow-up Instructions Return in about 2 weeks (around 8/1/2017). Routing History Your Appointments 7/27/2017 10:45 AM  
ESTABLISHED PATIENT with MD Urbano Cummings [id number] ([facility]) Appt Note: ep/ f/u after scan on tuesday [address] [place] [address] [facility] [phone]  
[facility]ly [facility], later [facility] 996 [phone]  
  
    
 7/28/2017  1:00 PM  
New Patient with Keith Montejo MD  
130 [facility] Oncology at [facility]) Appt Note: New Patient - Dr. Kieth Crowe junction adenocarcinoma [address] [place] [address] [facility]  
[phone]  
  
   
 [address] [facility] [address]  
  
    
 9/13/2017 11:00 AM  
ROUTINE CARE with Phyllis Smalls MD  
[address] [place] [address] OFFICE-ANNEX ([facility]) Appt Note: 3 mo f/u; r/s  
 [address] [facility] [phone]  
[phone] [phone]  
  
    
 10/5/2017  3:00 PM  
ROUTINE CARE with Phyllis Smalls MD  
[facility] OFFICE-ANNEX ([facility]) Appt Note: 6 mnth fu  
 [address] [facility] [phone]  
[phone] Upcoming Health Maintenance Date Due  
 GLAUCOMA SCREENING Q2Y 3/13/2016 INFLUENZA AGE 9 TO ADULT 8/1/2017 MEDICARE YEARLY EXAM 4/6/2018 DTaP/Tdap/Td series (2 - Td) 4/11/2024 Allergies as of 7/18/2017  Review Complete On: 7/18/2017 By: Jeffrey Fowler No Known Allergies Current Immunizations  Reviewed on 8/6/2015 Name Date Influenza High Dose Vaccine PF 9/15/2016, 12/7/2015 Influenza Vaccine 4/11/2014 Influenza Vaccine PF 10/9/2013 Pneumococcal Conjugate (PCV-13) 8/6/2015 Pneumococcal Polysaccharide (PPSV-23) 4/11/2014 Tdap 4/11/2014 Not reviewed this visit You Were Diagnosed With   
  
 Codes Comments GE junction carcinoma ([facility] 75.)    -  Primary ICD-10-CM: C16.0 ICD-9-CM: 151.0 Vitals BP Pulse Temp Resp Height(growth percentile) Weight(growth percentile) 136/80 (BP 1 Location: Left arm, BP Patient Position: Sitting) 85 98 °F (36.7 °C) (Oral) 18 6' (1.829 m) 192 lb (87.1 kg) SpO2 BMI Smoking Status 98% 26.04 kg/m2 Former Smoker BMI and BSA Data Body Mass Index Body Surface Area 26.04 kg/m 2 2.1 m 2 Preferred Pharmacy Pharmacy Name Phone [phone] [facility] [phone] Your Updated Medication List  
  
   
This list is accurate as of: 7/18/17 11:59 PM.  Always use your most recent med list.  
  
  
  
  
 aspirin 81 mg tablet Take 1 Tab by mouth daily. calcium-cholecalciferol (D3) tablet Commonly known as:  CALTRATE 600+D Take 1 Tab by mouth two (2) times a day. capsaicin 0.1 % topical cream  
Commonly known as:  CAPZASIN-HP Apply  to affected area three (3) times daily. chlorpheniramine 4 mg tablet Commonly known as:  ALLERGY RELIEF(CHLORPHENIRAMN) TAKE 1 TABLET BY MOUTH TWICE A DAY AS NEEDED FOR ALLERGIES OR RHINITIS * cyanocobalamin 1,000 mcg tablet TAKE 1 TABLET BY MOUTH EVERY DAY  
  
 * VITAMIN B-12 1,000 mcg tablet Generic drug:  cyanocobalamin ER  
TAKE 1 TABLET BY MOUTH EVERY DAY  
  
 famotidine 20 mg tablet Commonly known as:  PEPCID Take 1 Tab by mouth two (2) times a day. finasteride 5 mg tablet Commonly known as:  PROSCAR Take 1 Tab by mouth daily. Indications: SYMPTOMATIC BENIGN PROSTATIC HYPERPLASIA  
  
 fluticasone 50 mcg/actuation nasal spray Commonly known as:  Eileen Garza 2 puffs daily  Indications: ALLERGIC CONJUNCTIVITIS, ALLERGIC RHINITIS  
  
 gabapentin 800 mg tablet Commonly known as:  NEURONTIN  
TAKE 1 TABLET BY MOUTH 3 TIMES A DAY HYDROcodone-ibuprofen 7.5-200 mg per tablet Commonly known as:  Charm Kales Take 1 Tab by mouth nightly as needed for Pain.  
  
 hydrocortisone 2.5 % rectal cream  
Commonly known as:  ANUSOL-HC Insert  into rectum four (4) times daily. lisinopril 5 mg tablet Commonly known as:  PRINIVIL, ZESTRIL  
TAKE 1 TAB BY MOUTH DAILY. loratadine 10 mg tablet Commonly known as:  Lynette Case Take 1 Tab by mouth daily. olopatadine 0.1 % ophthalmic solution Commonly known as:  PATANOL Administer 2 Drops to both eyes two (2) times a day. ondansetron 4 mg disintegrating tablet Commonly known as:  ZOFRAN ODT Take 1 Tab by mouth every eight (8) hours as needed for Nausea. pantoprazole 40 mg tablet Commonly known as:  PROTONIX Take 1 Tab by mouth daily. One tab 30min before meal twice daily  Indications: Duodenal Ulcer, gastroesophageal reflux disease, HEARTBURN, Upper GI Bleed  
  
 permethrin 5 % topical cream  
Commonly known as:  ACTICIN  
apply sparingly as directed, apply sparingly from the forehead hairlines all over the entire body including the soles of the feet and between the toes, leave on for 12 hrs then rinse off, repeat the same in 3 days  
  
 polyethylene glycol 17 gram/dose powder Commonly known as:  Court Hugger DISSOLVE 1 SCOOP IN THE WATER ONCE A DAY  
  
 psyllium packet Commonly known as:  METAMUCIL Take 1 Packet by mouth two (2) times a day. Hold for Loose bowl Movement  
  
 raNITIdine 150 mg tablet Commonly known as:  ZANTAC Take 150 mg by mouth two (2) times a day. sucralfate 100 mg/mL suspension Commonly known as:  Lafonda Rail Take 5 mL by mouth four (4) times daily. Indications: Duodenal Ulcer  
  
 tamsulosin 0.4 mg capsule Commonly known as:  FLOMAX TAKE 2 CAPS BY MOUTH DAILY. * Notice: This list has 2 medication(s) that are the same as other medications prescribed for you. Read the directions carefully, and ask your doctor or other care provider to review them with you. We Performed the Following REFERRAL TO ONCOLOGY [XVW68 Custom] Comments:  
 Please evaluate patient for new diagnosis of GE junction adenocarcinoma. Follow-up Instructions Return in about 2 weeks (around 8/1/2017). To-Do List   
 Around 07/19/2017 Imaging:  PET/CT TUMOR IMAGE SKULL THIGH W (INI)   
  
 07/25/2017 12:00 PM  
  Appointment with [facility] PET 1 at [facility] RAD PET ([phone]) 1. Patient should arrive 30 minutes early to register. Patient's entire visit to the hospital will last about 2 Hours. 2.  Eat a low carb/high protein diet the evening before your procedure. Stay away from food and drink that contains sugars the night before and ESPECIALLY the day of the test. 3.  Do Not eat 4 hours prior to your procedure. The patient may drink all the water they want, up until the time of their appointment. Encourage patient to be well hydrated. Coffee is ok, as long as an artificial sweetener is used instead of sugar. 4. Take meds with water or saltine crackers if food required. 5.  Do not exercise the morning of your procedure. 6.  If you take insulin, it must be taken at least 4 hours before your procedure. 7.  You should bring medications for pain, anxiety, or claustrophobia if you need them. If you take medications for anxiety or claustrophobia, a ride needs to come with you. 8.  Materials for this procedure are ordered in advance and are time-sensitive. If you need to cancel or reschedule, you must call [phone] at least 48 hours prior to your appointment time. 9.  Bring recent CT scan results from other facilities with you.   Please have patients report to:  [facility]: ER Registration SFM: [address] [facility], Radiation/Oncology Department (left of the [facility]) MRM: OP Registration MOB 1. Referral Information Referral ID Referred By Referred To  
  
 [id number] AZUL NEWBERRY MD   
   [address] Leonardo Hale Phone: [phone] Fax: [phone] Visits Status Start Date End Date 1 Closed 7/18/17 7/18/18 If your referral has a status of pending review or denied, additional information will be sent to support the outcome of this decision. Introducing [facility]! Dear Catarino Cadet: Thank you for requesting a [employer] account. Our records indicate that you have previously registered for a [employer] account but its currently inactive. Please call our [employer] support line at [phone]. Additional Information If you have questions, please visit the Frequently Asked Questions section of the [employer] website at [email or web address] Remember, [employer] is NOT to be used for urgent needs. For medical emergencies, dial 911. Now available from your iPhone and Android! Please provide this summary of care documentation to your next provider. Your primary care clinician is listed as Wolfgang Hawley. If you have any questions after today's visit, please call [phone].

## 2017-07-19 PROBLEM — C16.0 GE JUNCTION CARCINOMA (HCC): Status: ACTIVE | Noted: 2017-01-01

## 2017-07-19 NOTE — PROGRESS NOTES
[id number] [facility] Surgery History and Physical    Chief Complaint: GE junction adenocarcinoma    History of Present Illness:      Jus Levy Sr. is a 80 y.o. male who was kindly referred by Dr. Sabiha Donahue for a newly diagnosed GE junction adenocarcinoma. The patient states he has had epigastric abdominal pain after taking PO for the past 6-7 months that is worsening. He states it is less severe with soft foods but now he even has pain with liquids. This has led to poor PO intake and a 20 lb weight loss in the past few months. He also describes increasing fatigue. Prior to this, he was very active for his age, including using a push lawnmower to [address] his lawn. He is limited by arthritis in his knees but denies any issues with dyspnea on exertion or chest pain. He remains active. The patient saw Dr. Laureen Riojas due to his pain and performed an EGD on 6/28/2017 that showed a 3-4 cm friable mass beginning just distal to the GE junction and extending down into the cardia/fundus. 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 He subsequently had a CT scan of the C/A/P on 7/6/17 that showed:  1. There is mildly prominent rectal retention. 2. 4.3 cm ascending aortic aneurysm, coronary artery disease, left and sigmoid  colon diverticulosis, right renal atrophy, and additional incidental findings as  above. 3. No acute findings or other findings to correlate with weight loss or blood in  Stool. The patient is referred to me for surgical opinion and further workup.       Past Medical History:   Diagnosis Date    Acid reflux     Arthritis     right  left knees    Back pain, chronic 4/11/2014    Decreased hearing of both ears 4/5/2017    DNR (do not resuscitate) 4/5/2017    Gastroesophageal reflux disease without esophagitis 6/13/2017    GERD (gastroesophageal reflux disease)     Hip pain, left 4/11/2014    Hypercholesterolemia     Hypertension     Need for varicella vaccine 4/11/2014    Rash of entire body 9/15/2016    Restless leg syndrome        Past Surgical History:   Procedure Laterality Date    ABDOMEN SURGERY PROC UNLISTED  1980's    gall bladder    ABDOMEN SURGERY PROC UNLISTED  15 yrs ago    right hernia repair    HX HEENT      EYE SURGERY 40 YRS AGO.  HX ORTHOPAEDIC      fluid removed both knees    HX ORTHOPAEDIC      bilateral knee replacements       Social History     Social History    Marital status:      Spouse name: N/A    Number of children: N/A    Years of education: N/A     Occupational History    Not on file. Social History Main Topics    Smoking status: Former Smoker     Years: 0.00     Quit date: 4/28/1970    Smokeless tobacco: Never Used    Alcohol use Yes      Comment: OCCASSIONALLY    Drug use: No    Sexual activity: Not Currently     Other Topics Concern    Not on file     Social History Narrative       Family History   Problem Relation Age of Onset    Heart Disease Sister          Current Outpatient Prescriptions:     pantoprazole (PROTONIX) 40 mg tablet, Take 1 Tab by mouth daily. One tab 30min before meal twice daily  Indications: Duodenal Ulcer, gastroesophageal reflux disease, HEARTBURN, Upper GI Bleed, Disp: 60 Tab, Rfl: 0    finasteride (PROSCAR) 5 mg tablet, Take 1 Tab by mouth daily. Indications: SYMPTOMATIC BENIGN PROSTATIC HYPERPLASIA, Disp: 30 Tab, Rfl: 6    lisinopril (PRINIVIL, ZESTRIL) 5 mg tablet, TAKE 1 TAB BY MOUTH DAILY. , Disp: 30 Tab, Rfl: 6    chlorpheniramine (ALLERGY RELIEF,CHLORPHENIRAMN,) 4 mg tablet, TAKE 1 TABLET BY MOUTH TWICE A DAY AS NEEDED FOR ALLERGIES OR RHINITIS, Disp: 60 Tab, Rfl: 1    sucralfate (CARAFATE) 100 mg/mL suspension, Take 5 mL by mouth four (4) times daily.  Indications: Duodenal Ulcer, Disp: 414 mL, Rfl: 2    gabapentin (NEURONTIN) 800 mg tablet, TAKE 1 TABLET BY MOUTH 3 TIMES A DAY, Disp: 90 Tab, Rfl: 4    VITAMIN B-12 1,000 mcg tablet, TAKE 1 TABLET BY MOUTH EVERY DAY, Disp: , Rfl: 6    polyethylene glycol (MIRALAX) 17 gram/dose powder, DISSOLVE 1 SCOOP IN THE WATER ONCE A DAY, Disp: , Rfl: 2    calcium-cholecalciferol, D3, (CALTRATE 600+D) tablet, Take 1 Tab by mouth two (2) times a day., Disp: 60 Tab, Rfl: 11    cyanocobalamin 1,000 mcg tablet, TAKE 1 TABLET BY MOUTH EVERY DAY, Disp: 30 Tab, Rfl: 6    tamsulosin (FLOMAX) 0.4 mg capsule, TAKE 2 CAPS BY MOUTH DAILY. , Disp: 60 Cap, Rfl: 6    loratadine (CLARITIN) 10 mg tablet, Take 1 Tab by mouth daily. , Disp: 30 Tab, Rfl: 4    ranitidine (ZANTAC) 150 mg tablet, Take 150 mg by mouth two (2) times a day., Disp: , Rfl:     fluticasone (FLONASE) 50 mcg/actuation nasal spray, 2 puffs daily  Indications: ALLERGIC CONJUNCTIVITIS, ALLERGIC RHINITIS, Disp: 1 Bottle, Rfl: 6    aspirin 81 mg tablet, Take 1 Tab by mouth daily. , Disp: 30 Tab, Rfl: 6    hydrocortisone (ANUSOL-HC) 2.5 % rectal cream, Insert  into rectum four (4) times daily. , Disp: 30 g, Rfl: 6    permethrin (ACTICIN) 5 % topical cream, apply sparingly as directed, apply sparingly from the forehead hairlines all over the entire body including the soles of the feet and between the toes, leave on for 12 hrs then rinse off, repeat the same in 3 days, Disp: 60 g, Rfl: 0    famotidine (PEPCID) 20 mg tablet, Take 1 Tab by mouth two (2) times a day., Disp: 60 Tab, Rfl: 0    ondansetron (ZOFRAN ODT) 4 mg disintegrating tablet, Take 1 Tab by mouth every eight (8) hours as needed for Nausea., Disp: 10 Tab, Rfl: 0    psyllium (METAMUCIL) packet, Take 1 Packet by mouth two (2) times a day. Hold for Loose bowl Movement, Disp: 60 Packet, Rfl: 11    HYDROcodone-ibuprofen (VICOPROFEN) 7.5-200 mg per tablet, Take 1 Tab by mouth nightly as needed for Pain., Disp: 30 Tab, Rfl: 0    capsaicin 0.1 % topical cream, Apply  to affected area three (3) times daily. , Disp: 43 g, Rfl: 5    olopatadine (PATANOL) 0.1 % ophthalmic solution, Administer 2 Drops to both eyes two (2) times a day., Disp: 10 mL, Rfl: 11    No Known Allergies    ROS   Constitutional: 20 lb weight loss, fatigue  Ears, Nose, Mouth, Throat, and Face: pain with swallowing  Respiratory: negative  Cardiovascular: negative  Gastrointestinal: See HPI  Genitourinary:negative  Integument/Breast: negative  Hematologic/Lymphatic: negative  Behavioral/Psychiatric: negative  Allergic/Immunologic: negative      Physical Exam:     Visit Vitals    /80 (BP 1 Location: Left arm, BP Patient Position: Sitting)    Pulse 85    Temp 98 °F (36.7 °C) (Oral)    Resp 18    Ht 6' (1.829 m)    Wt 192 lb (87.1 kg)    SpO2 98%    BMI 26.04 kg/m2       General - alert and oriented, no apparent distress  HEENT - NC/AT. No scleral icterus  Pulm - CTAB, normal inspiratory effort  CV - RRR, no M/R/G  Abd - soft, NT, ND. No palpable masses or organomegaly. Ext - warm, well perfused, no edema  Lymphatics - no cervical, supraclavicular or inguinal adenopathy noted. Skin - supple, no rashes  Psychiatric - normal affect, good mood    Labs  Results for Tyshawn Holliday SR. (MRN [id number]) as of 7/19/2017 14:46   Ref. Range 6/13/2017 08:53   WBC Latest Ref Range: 3.4 - 10.8 x10E3/uL 7.1   RBC Latest Ref Range: 4.14 - 5.80 x10E6/uL 4.20   HGB Latest Ref Range: 12.6 - 17.7 g/dL 13.8   HCT Latest Ref Range: 37.5 - 51.0 % 40.7   MCV Latest Ref Range: 79 - 97 fL 97   MCH Latest Ref Range: 26.6 - 33.0 pg 32.9   MCHC Latest Ref Range: 31.5 - 35.7 g/dL 33.9   RDW Latest Ref Range: 12.3 - 15.4 % 13.3   PLATELET Latest Ref Range: 150 - 379 x10E3/uL 233     Results for Rickie AHN SR. (MRN [id number]) as of 7/19/2017 14:46   Ref.  Range 6/13/2017 08:53   Sodium Latest Ref Range: 134 - 144 mmol/L 137   Potassium Latest Ref Range: 3.5 - 5.2 mmol/L 4.4   Chloride Latest Ref Range: 96 - 106 mmol/L 97   CO2 Latest Ref Range: 18 - 29 mmol/L 23   Glucose Latest Ref Range: 65 - 99 mg/dL 99   BUN Latest Ref Range: 8 - 27 mg/dL 21   Creatinine Latest Ref Range: 0.76 - 1.27 mg/dL 1.68 (H)   BUN/Creatinine ratio Latest Ref Range: 10 - 24  13   Calcium Latest Ref Range: 8.6 - 10.2 mg/dL 10.0   GFR est non-AA Latest Ref Range: >59 mL/min/1.73 36 (L)   GFR est AA Latest Ref Range: >59 mL/min/1.73 42 (L)   Bilirubin, total Latest Ref Range: 0.0 - 1.2 mg/dL 0.6   Protein, total Latest Ref Range: 6.0 - 8.5 g/dL 6.9   Albumin Latest Ref Range: 3.5 - 4.7 g/dL 4.5   A-G Ratio Latest Ref Range: 1.2 - 2.2  1.9   ALT (SGPT) Latest Ref Range: 0 - 44 IU/L 19   AST Latest Ref Range: 0 - 40 IU/L 16   Alk. phosphatase Latest Ref Range: 39 - 117 IU/L 123 (H)   Amylase Latest Ref Range: 31 - 124 U/L 86   Lipase Latest Ref Range: 0 - 59 U/L 40     Imaging  CT C/A/P- See HPI    I have reviewed and agree with all of the pertinent images    Assessment:     Eddy Wyman Sr. is a 80 y.o. 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 No evidence of metastatic disease on CT C/A/P. Recommendations:     1. I have discussed with the patient his diagnosis of cancer and that Siewart II GE junction adenocarcinomas are typically treated at esophageal cancers. I would like to further stage this with a PET/CT to look for evidence of metastatic disease and with EUS for T and N stage evaluation. Based upon size seen on endoscopy, this will likely be appropriate for neoadjuvant therapy prior to further consideration of surgical resection. I will order the PET and add the patient on for tumor board discussion. He will follow up with me after this is completed to discuss further treatment plans. I will also go ahead and refer him to medical oncology to help expedite his treatment and workup. 30 mins of time was spent with the patient of which > 50% of the time involved face-to-face counseling of the patient regarding the proposed treatment plan.       Amber Ladd MD  7/19/2017    CC: MD Dr. Selwyn Ghotra Dr. Roberta Flores

## 2017-07-24 NOTE — PROGRESS NOTES
Bedside and Verbal shift change report given to rivera,rn (oncoming nurse) by Rafita Milligan (offgoing nurse). Report included the following information SBAR.

## 2017-07-24 NOTE — DISCHARGE INSTRUCTIONS
118 [facility].  [address] [facility] [address] [facility]  [phone]                     [place] [address]  [id number]  7/29/1931    DISCOMFORT:  Sore throat-  warm salt water gargle  redness at IV site- apply warm compress to area; if redness or soreness persist- contact your physician  Gaseous discomfort- walking, belching will help relieve any discomfort  You may not operate a vehicle for 12 hours  You may not engage in an occupation involving machinery or appliances for rest of today  You may not drink alcoholic beverages for at least 12 hours  Avoid making any critical decisions for at least 24 hour  DIET  You may eat and drink after you leave. You may resume your regular diet - however -  remember your colon is empty and a heavy meal will produce gas. Avoid these foods:  vegetables, fried / greasy foods, carbonated drinks    ACTIVITY  You may resume your normal daily activities   Spend the remainder of the day resting -  avoid any strenuous activity. CALL M.D. ANY SIGN OF   Increasing pain, nausea, vomiting  Abdominal distension (swelling)  New increased bleeding (oral or rectal)  Fever (chills)  Pain in chest area  Bloody discharge from nose or mouth  Shortness of breath    Follow-up Instructions:   Call Dr. Zaina Ham for any questions or problems. Telephone # [phone]        Continue same medications.

## 2017-07-24 NOTE — ROUTINE PROCESS
Disha Rey .  7/29/1931  [id number]    Situation:  Verbal report received from: Fern Singh  Procedure: Procedure(s):  RADIAL UPPER ENDOSCOPIC ULTRASOUND (EUS)   ESOPHAGOGASTRODUODENOSCOPY (EGD)    Background:    Preoperative diagnosis: GASTROESOPHAGEAL JUNCTION   ADENOCARCINOMA   Postoperative diagnosis: 1.- Gastric Cardia Mass      :  Dr. Inge Corado  Assistant(s): Endoscopy Technician-1: Alek Edmondson  Endoscopy RN-1: Ravinder De Leon RN    Specimens: * No specimens in log *  H. Pylori  no    Assessment:  Intra-procedure medications   Anesthesia gave intra-procedure sedation and medications, see anesthesia flow sheet yes    Intravenous fluids: NS@ KVO     Vital signs stable     Abdominal assessment: round and soft     Recommendation:  Discharge patient per MD order.     Family or Friend   Permission to share finding with family or friend yes

## 2017-07-24 NOTE — IP AVS SNAPSHOT
2700 [facility] [address] [place] [address] 
[phone] Patient: El Asha Lee MRN: [id number] NYW:[birth year] You are allergic to the following No active allergies Recent Documentation Height Weight BMI Smoking Status 1.829 m 83.6 kg 25.01 kg/m2 Former Smoker Emergency Contacts Name Discharge Info Relation Home Work [facility] DISCHARGE CAREGIVER [3] Daughter [21]   [phone] [facility]  Child [2] [phone] About your hospitalization You were admitted on:  July 24, 2017 You last received care in the:  [facility] ENDOSCOPY You were discharged on:  July 24, 2017 Unit phone number:  [phone] Why you were hospitalized Your primary diagnosis was:  Not on File Providers Seen During Your Hospitalizations Provider Role Specialty Primary office phone Karly Ceja MD Attending Provider Gastroenterology [phone] Your Primary Care Physician (PCP) Primary Care Physician Office Phone Office Fax Virl Copper [phone] Follow-up Information None Your Appointments Tuesday July 25, 2017 12:00 PM EDT  
YANA PET/CT TMR IMG SKULL THIGH with [facility] PET 1 [facility] RAD PET ([address]) [address]  
[phone] 1. Patient should arrive 30 minutes early to register. Patient's entire visit to the hospital will last about 2 Hours. 2.  Eat a low carb/high protein diet the evening before your procedure. Stay away from food and drink that contains sugars the night before and ESPECIALLY the day of the test. 3.  Do Not eat 4 hours prior to your procedure. The patient may drink all the water they want, up until the time of their appointment. Encourage patient to be well hydrated. Coffee is ok, as long as an artificial sweetener is used instead of sugar.  4. Take meds with water or saltine crackers if food required. 5.  Do not exercise the morning of your procedure. 6.  If you take insulin, it must be taken at least 4 hours before your procedure. 7.  You should bring medications for pain, anxiety, or claustrophobia if you need them. If you take medications for anxiety or claustrophobia, a ride needs to come with you. 8.  Materials for this procedure are ordered in advance and are time-sensitive. If you need to cancel or reschedule, you must call [phone] at least 48 hours prior to your appointment time. 9.  Bring recent CT scan results from other facilities with you. Please have patients report to:  [facility]: ER Registration SFM: [facility], Radiation/Oncology Department (left of the fireplace) MRM: OP Registration MOB 1. Patient should report to Emergency Department registration 30 minutes prior to the appointment time unless instructed otherwise. Thursday July 27, 2017 10:45 AM EDT  
ESTABLISHED PATIENT with MD Urbano Gaspar 137 560 [facility] 217 [facility] [address] [address]  
[phone] Friday July 28, 2017  1:00 PM EDT New Patient with Darylene Pike, MD  
[address] [place] [address] Oncology at [facility]) 217 [facility] [address] 1400 [address] [place] [address]  
[phone] Current Discharge Medication List  
  
CONTINUE these medications which have NOT CHANGED Dose & Instructions Dispensing Information Comments Morning Noon Evening Bedtime  
 aspirin 81 mg tablet Your last dose was: Your next dose is:    
   
   
 Dose:  81 mg Take 1 Tab by mouth daily. Quantity:  30 Tab Refills:  6  
     
   
   
   
  
 calcium-cholecalciferol (D3) tablet Commonly known as:  CALTRATE 600+D Your last dose was: Your next dose is:    
   
   
 Dose:  1 Tab Take 1 Tab by mouth two (2) times a day. Quantity:  60 Tab Refills:  11  
     
   
   
   
  
 capsaicin 0.1 % topical cream  
Commonly known as:  CAPZASIN-HP Your last dose was: Your next dose is:    
   
   
 Apply  to affected area three (3) times daily. Quantity:  43 g Refills:  5  
     
   
   
   
  
 chlorpheniramine 4 mg tablet Commonly known as:  ALLERGY RELIEF(CHLORPHENIRAMN) Your last dose was: Your next dose is: TAKE 1 TABLET BY MOUTH TWICE A DAY AS NEEDED FOR ALLERGIES OR RHINITIS Quantity:  60 Tab Refills:  1  
     
   
   
   
  
 * cyanocobalamin 1,000 mcg tablet Your last dose was: Your next dose is: TAKE 1 TABLET BY MOUTH EVERY DAY Quantity:  30 Tab Refills:  6  
     
   
   
   
  
 * VITAMIN B-12 1,000 mcg tablet Generic drug:  cyanocobalamin ER Your last dose was: Your next dose is: TAKE 1 TABLET BY MOUTH EVERY DAY Refills:  6  
     
   
   
   
  
 famotidine 20 mg tablet Commonly known as:  PEPCID Your last dose was: Your next dose is:    
   
   
 Dose:  20 mg Take 1 Tab by mouth two (2) times a day. Quantity:  60 Tab Refills:  0  
     
   
   
   
  
 finasteride 5 mg tablet Commonly known as:  PROSCAR Your last dose was: Your next dose is:    
   
   
 Dose:  5 mg Take 1 Tab by mouth daily. Indications: SYMPTOMATIC BENIGN PROSTATIC HYPERPLASIA Quantity:  30 Tab Refills:  6  
     
   
   
   
  
 fluticasone 50 mcg/actuation nasal spray Commonly known as:  Leafy Few Your last dose was: Your next dose is:    
   
   
 2 puffs daily  Indications: ALLERGIC CONJUNCTIVITIS, ALLERGIC RHINITIS Quantity:  1 Bottle Refills:  6  
     
   
   
   
  
 gabapentin 800 mg tablet Commonly known as:  NEURONTIN Your last dose was: Your next dose is: TAKE 1 TABLET BY MOUTH 3 TIMES A DAY Quantity:  90 Tab Refills:  4 HYDROcodone-ibuprofen 7.5-200 mg per tablet Commonly known as:  Rendell Marie Your last dose was: Your next dose is:    
   
   
 Dose:  1 Tab Take 1 Tab by mouth nightly as needed for Pain. Quantity:  30 Tab Refills:  0  
     
   
   
   
  
 hydrocortisone 2.5 % rectal cream  
Commonly known as:  ANUSOL-HC Your last dose was: Your next dose is: Insert  into rectum four (4) times daily. Quantity:  30 g Refills:  6  
     
   
   
   
  
 lisinopril 5 mg tablet Commonly known as:  Cayey Rm Your last dose was: Your next dose is: TAKE 1 TAB BY MOUTH DAILY. Quantity:  30 Tab Refills:  6  
     
   
   
   
  
 loratadine 10 mg tablet Commonly known as:  Gaile Chimera Your last dose was: Your next dose is:    
   
   
 Dose:  10 mg Take 1 Tab by mouth daily. Quantity:  30 Tab Refills:  4  
     
   
   
   
  
 olopatadine 0.1 % ophthalmic solution Commonly known as:  PATANOL Your last dose was: Your next dose is:    
   
   
 Dose:  2 Drop Administer 2 Drops to both eyes two (2) times a day. Quantity:  10 mL Refills:  11  
     
   
   
   
  
 ondansetron 4 mg disintegrating tablet Commonly known as:  ZOFRAN ODT Your last dose was: Your next dose is:    
   
   
 Dose:  4 mg Take 1 Tab by mouth every eight (8) hours as needed for Nausea. Quantity:  10 Tab Refills:  0  
     
   
   
   
  
 pantoprazole 40 mg tablet Commonly known as:  PROTONIX Your last dose was: Your next dose is:    
   
   
 Dose:  40 mg Take 1 Tab by mouth daily. One tab 30min before meal twice daily  Indications: Duodenal Ulcer, gastroesophageal reflux disease, HEARTBURN, Upper GI Bleed Quantity:  60 Tab Refills:  0  
     
   
   
   
  
 permethrin 5 % topical cream  
Commonly known as:  ACTICIN Your last dose was: Your next dose is: apply sparingly as directed, apply sparingly from the forehead hairlines all over the entire body including the soles of the feet and between the toes, leave on for 12 hrs then rinse off, repeat the same in 3 days Quantity:  60 g Refills:  0  
     
   
   
   
  
 polyethylene glycol 17 gram/dose powder Commonly known as:  Lawerance Amas Your last dose was: Your next dose is:    
   
   
 DISSOLVE 1 SCOOP IN THE WATER ONCE A DAY Refills:  2  
     
   
   
   
  
 psyllium packet Commonly known as:  METAMUCIL Your last dose was: Your next dose is:    
   
   
 Dose:  1 Packet Take 1 Packet by mouth two (2) times a day. Hold for Loose bowl Movement Quantity:  60 Packet Refills:  11  
     
   
   
   
  
 raNITIdine 150 mg tablet Commonly known as:  ZANTAC Your last dose was: Your next dose is:    
   
   
 Dose:  150 mg Take 150 mg by mouth two (2) times a day. Refills:  0  
     
   
   
   
  
 sucralfate 100 mg/mL suspension Commonly known as:  Rana Dangelo Your last dose was: Your next dose is:    
   
   
 Dose:  1 tsp Take 5 mL by mouth four (4) times daily. Indications: Duodenal Ulcer Quantity:  414 mL Refills:  2  
     
   
   
   
  
 tamsulosin 0.4 mg capsule Commonly known as:  FLOMAX Your last dose was: Your next dose is: TAKE 2 CAPS BY MOUTH DAILY. Quantity:  60 Cap Refills:  6  
     
   
   
   
  
 * Notice: This list has 2 medication(s) that are the same as other medications prescribed for you. Read the directions carefully, and ask your doctor or other care provider to review them with you. Discharge Instructions Zelda Judge. 
[address] [place] [address],  [address]  
[phone] DISCHARGE INSTRUCTIONS Gris Magdaleno 
[id number] 
7/29/1931 DISCOMFORT: 
Sore throat-  warm salt water gargle redness at IV site- apply warm compress to area; if redness or soreness persist- contact your physician Gaseous discomfort- walking, belching will help relieve any discomfort You may not operate a vehicle for 12 hours You may not engage in an occupation involving machinery or appliances for rest of today You may not drink alcoholic beverages for at least 12 hours Avoid making any critical decisions for at least 24 hour DIET You may eat and drink after you leave. You may resume your regular diet  however -  remember your colon is empty and a heavy meal will produce gas. Avoid these foods:  vegetables, fried / greasy foods, carbonated drinks ACTIVITY You may resume your normal daily activities Spend the remainder of the day resting -  avoid any strenuous activity. CALL M.D. ANY SIGN OF Increasing pain, nausea, vomiting Abdominal distension (swelling) New increased bleeding (oral or rectal) Fever (chills) Pain in chest area Bloody discharge from nose or mouth Shortness of breath Follow-up Instructions: 
 Call Dr. Shania Victoria for any questions or problems. Telephone # [phone] Continue same medications. Discharge Orders None Introducing [facility]! Keysha Gómez introduces PeerReach patient portal. Now you can access parts of your medical record, email your doctor's office, and request medication refills online. 1. In your internet browser, go to [email or web address] 2. Click on the First Time User? Click Here link in the Sign In box. You will see the New Member Sign Up page. 3. Enter your PeerReach Access Code exactly as it appears below. You will not need to use this code after youve completed the sign-up process. If you do not sign up before the expiration date, you must request a new code. · PeerReach Access Code: [email or web address] Expires: 10/21/2017  2:34 PM 
 
 4. Enter the last four digits of your Social Security Number (xxxx) and Date of Birth (mm/dd/yyyy) as indicated and click Submit. You will be taken to the next sign-up page. 5. Create a Jagex ID. This will be your Jagex login ID and cannot be changed, so think of one that is secure and easy to remember. 6. Create a Jagex password. You can change your password at any time. 7. Enter your Password Reset Question and Answer. This can be used at a later time if you forget your password. 8. Enter your e-mail address. You will receive e-mail notification when new information is available in [address] 9. Click Sign Up. You can now view and download portions of your medical record. 10. Click the Download Summary menu link to download a portable copy of your medical information. If you have questions, please visit the Frequently Asked Questions section of the Jagex website. Remember, Jagex is NOT to be used for urgent needs. For medical emergencies, dial 911. Now available from your iPhone and Android! General Information Please provide this summary of care documentation to your next provider. Patient Signature:  ____________________________________________________________ Date:  ____________________________________________________________  
  
[facility] Provider Signature:  ____________________________________________________________ Date:  ____________________________________________________________

## 2017-07-24 NOTE — ANESTHESIA POSTPROCEDURE EVALUATION
Post-Anesthesia Evaluation and Assessment    Patient: Erlinda Camacho Sr. MRN: [id number]  SSN: [id number]    YOB: 1931  Age: 80 y.o. Sex: male       Cardiovascular Function/Vital Signs  Visit Vitals    /88    Pulse 89    Temp 36.8 °C (98.2 °F)    Resp 14    Ht 6' (1.829 m)    Wt 83.6 kg (184 lb 6 oz)    SpO2 96%    BMI 25.01 kg/m2       Patient is status post MAC anesthesia for Procedure(s):  RADIAL UPPER ENDOSCOPIC ULTRASOUND (EUS)   ESOPHAGOGASTRODUODENOSCOPY (EGD). Nausea/Vomiting: None    Postoperative hydration reviewed and adequate. Pain:  Pain Scale 1: Numeric (0 - 10) (07/24/17 1521)  Pain Intensity 1: 3 (07/24/17 1521)   Managed    Neurological Status: At baseline    Mental Status and Level of Consciousness: Arousable    Pulmonary Status:   O2 Device: CO2 nasal cannula (07/24/17 1637)   Adequate oxygenation and airway patent    Complications related to anesthesia: None    Post-anesthesia assessment completed.  No concerns    Signed By: Nathalie Jj MD     July 24, 2017

## 2017-07-24 NOTE — PROCEDURES
[address]  [address] Pablo Maciel, [address]  [phone]                                Endoscopic Ultrasound    NAME:  Werner Sylvester Sr.   :   1931   MRN:   [id number]       Date/Time:  2017   Procedure Type: Radial Upper EUS    Indications: Gastric mass    Pre-operative Diagnosis: see indication above    Post-operative Diagnosis:  See findings below    : Arely Andersen MD    Referring Provider: Wes Mendenhall MD    Anethesia/Sedation:  MAC anesthesia      Procedure Details   After infom consent was obtained for the procedure, with all risks and benefits of procedure explained the patient was taken to the endoscopy suite and placed in the left lateral decubitus position. Following sequential administration of sedation as per above, the radial echoendoscope was inserted into the mouth and advanced under direct vision to esophagus. A careful inspection was made as the gastroscope was withdrawn, including a retroflexed view of the proximal stomach; findings and interventions are described below. Findings:     Endoscopic:   Esophagus: Mucosa was normal. 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 Stomach: Ulcerated, stenosing mass was seen immediately distal to the GE junction. Mass was extending into the fundus. Rest of the stomach was normal.    Duodenum/jejunum: normal    Ultrasound:   Esophagus: The EUS scope could not be negotiated across the GE junction due to stenosis and friability. Hence adequate T staging of the gastric mass could be adequately evaluated. Stomach: not examined   Pancreas: not examined              Lymph Node: no mediastinal adenopathy        Specimen Removed:  None    Complications: None. EBL:  None.     Interventions: none      Recommendations:   -Await PET scan  -Await upcoming Oncology consultation  -EGD with stent placement if he has worsening dysphagia    Arely Andersen MD  2017  4:44 PM

## 2017-07-24 NOTE — PROGRESS NOTES

## 2017-07-27 NOTE — MR AVS SNAPSHOT
Visit Information Date & Time Provider Department Dept. Phone Encounter #  
 2017 10:45 AM Canelo Broussardkaitlyn 137 [phone] [id number] Follow-up Instructions Return if symptoms worsen or fail to improve. Routing History Your Appointments 2017  1:00 PM  
New Patient with Alexey Sands MD  
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  
[phone]  
  
   
 [address] [facility] [address]  
  
    
 2017 11:00 AM  
ROUTINE CARE with Jaylon Najera MD  
69 [facility] OFFICE-ANNEX ([address]) Appt Note: 3 mo f/u; r/s  
 [address] [facility] [address] [address]  
[phone] [address] [address]  
  
    
 10/5/2017  3:00 PM  
ROUTINE CARE with Jaylon Najera MD  
[facility] OFFICE-ANNEX ([address]) Appt Note: 6 mnth fu  
 [address] [facility] [address] [address]  
[phone] Upcoming Health Maintenance Date Due  
 GLAUCOMA SCREENING Q2Y 3/13/2016 INFLUENZA AGE 9 TO ADULT 2017 MEDICARE YEARLY EXAM 2018 DTaP/Tdap/Td series (2 - Td) 2024 Allergies as of 2017  Review Complete On: 2017 By: Samira Scott MD  
 No Known Allergies Current Immunizations  Reviewed on 2015 Name Date Influenza High Dose Vaccine PF 9/15/2016, 2015 Influenza Vaccine 2014 Influenza Vaccine PF 10/9/2013 Pneumococcal Conjugate (PCV-13) 2015 Pneumococcal Polysaccharide (PPSV-23) 2014 Tdap 2014 Not reviewed this visit You Were Diagnosed With   
  
 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  
  
   
This list is accurate as of: 7/27/17  6:21 PM.  Always use your most recent med list.  
  
  
  
  
 aspirin 81 mg tablet Take 1 Tab by mouth daily. calcium-cholecalciferol (D3) tablet Commonly known as:  CALTRATE 600+D Take 1 Tab by mouth two (2) times a day. capsaicin 0.1 % topical cream  
Commonly known as:  CAPZASIN-HP Apply  to affected area three (3) times daily. chlorpheniramine 4 mg tablet Commonly known as:  ALLERGY RELIEF(CHLORPHENIRAMN) TAKE 1 TABLET BY MOUTH TWICE A DAY AS NEEDED FOR ALLERGIES OR RHINITIS * cyanocobalamin 1,000 mcg tablet TAKE 1 TABLET BY MOUTH EVERY DAY  
  
 * VITAMIN B-12 1,000 mcg tablet Generic drug:  cyanocobalamin ER  
TAKE 1 TABLET BY MOUTH EVERY DAY  
  
 famotidine 20 mg tablet Commonly known as:  PEPCID Take 1 Tab by mouth two (2) times a day. finasteride 5 mg tablet Commonly known as:  PROSCAR Take 1 Tab by mouth daily. Indications: SYMPTOMATIC BENIGN PROSTATIC HYPERPLASIA  
  
 fluticasone 50 mcg/actuation nasal spray Commonly known as:  Viky Cisneros 2 puffs daily  Indications: ALLERGIC CONJUNCTIVITIS, ALLERGIC RHINITIS  
  
 gabapentin 800 mg tablet Commonly known as:  NEURONTIN  
TAKE 1 TABLET BY MOUTH 3 TIMES A DAY HYDROcodone-ibuprofen 7.5-200 mg per tablet Commonly known as:  Nolan Gentle Take 1 Tab by mouth nightly as needed for Pain. hydrocortisone 2.5 % rectal cream  
Commonly known as:  ANUSOL-HC Insert  into rectum four (4) times daily. lisinopril 5 mg tablet Commonly known as:  PRINIVIL, ZESTRIL  
TAKE 1 TAB BY MOUTH DAILY. loratadine 10 mg tablet Commonly known as:  Gene Marie Take 1 Tab by mouth daily. olopatadine 0.1 % ophthalmic solution Commonly known as:  PATANOL Administer 2 Drops to both eyes two (2) times a day. ondansetron 4 mg disintegrating tablet Commonly known as:  ZOFRAN ODT Take 1 Tab by mouth every eight (8) hours as needed for Nausea. OTHER Daughter states patient is taking polyglycol  
  
 pantoprazole 40 mg tablet Commonly known as:  PROTONIX Take 1 Tab by mouth daily. One tab 30min before meal twice daily  Indications: Duodenal Ulcer, gastroesophageal reflux disease, HEARTBURN, Upper GI Bleed  
  
 permethrin 5 % topical cream  
Commonly known as:  ACTICIN  
apply sparingly as directed, apply sparingly from the forehead hairlines all over the entire body including the soles of the feet and between the toes, leave on for 12 hrs then rinse off, repeat the same in 3 days  
  
 polyethylene glycol 17 gram/dose powder Commonly known as:  Santosh Holster DISSOLVE 1 SCOOP IN THE WATER ONCE A DAY  
  
 psyllium packet Commonly known as:  METAMUCIL Take 1 Packet by mouth two (2) times a day. Hold for Loose bowl Movement  
  
 raNITIdine 150 mg tablet Commonly known as:  ZANTAC Take 150 mg by mouth two (2) times a day. sucralfate 100 mg/mL suspension Commonly known as:  Anya  Take 5 mL by mouth four (4) times daily. Indications: Duodenal Ulcer  
  
 tamsulosin 0.4 mg capsule Commonly known as:  FLOMAX TAKE 2 CAPS BY MOUTH DAILY. * Notice: This list has 2 medication(s) that are the same as other medications prescribed for you. Read the directions carefully, and ask your doctor or other care provider to review them with you. Follow-up Instructions Return if symptoms worsen or fail to improve. Introducing [facility] & [facility]! [address] introduces YouScan patient portal. Now you can access parts of your medical record, email your doctor's office, and request medication refills online. 1. In your internet browser, go to [email or web address] 2. Click on the First Time User? Click Here link in the Sign In box. You will see the New Member Sign Up page. 3. Enter your YouScan Access Code exactly as it appears below. You will not need to use this code after youve completed the sign-up process. If you do not sign up before the expiration date, you must request a new code. · YouScan Access Code: [email or web address] Expires: 10/21/2017  2:34 PM 
 
4. Enter the last four digits of your Social Security Number (xxxx) and Date of Birth (mm/dd/yyyy) as indicated and click Submit. You will be taken to the next sign-up page. 5. Create a YouScan ID. This will be your YouScan login ID and cannot be changed, so think of one that is secure and easy to remember. 6. Create a YouScan password. You can change your password at any time. 7. Enter your Password Reset Question and Answer. This can be used at a later time if you forget your password. 8. Enter your e-mail address. You will receive e-mail notification when new information is available in [address] 9. Click Sign Up. You can now view and download portions of your medical record. 10. Click the Download Summary menu link to download a portable copy of your medical information. If you have questions, please visit the Frequently Asked Questions section of the YouScan website. Remember, YouScan is NOT to be used for urgent needs. For medical emergencies, dial 911. Now available from your iPhone and Android! Please provide this summary of care documentation to your next provider. Your primary care clinician is listed as Sandra Eid. If you have any questions after today's visit, please call [phone].

## 2017-07-27 NOTE — PROGRESS NOTES
[address] [facility] Surgery      Clinic Note - Follow up    [address] returns for scheduled follow up today. He had a PET/CT and attempted EUS since our last visit to complete staging. His PET showed no evidence of metastatic disease. Unfortunately his tumor prevented passage of the EUS scope and thus it was not able to be completed. The patient states he continues to have pain with eating and is having more issues with constipation. He continues to lose weight. He is considering not wanting to have surgery due to the scope of surgery that would be required. He has no other new complaints. Objective     Visit Vitals    /84 (BP 1 Location: Right arm, BP Patient Position: Sitting)    Pulse 82    Temp 98 °F (36.7 °C) (Oral)    Resp 16    Ht 6' (1.829 m)    Wt 188 lb (85.3 kg)    SpO2 96%    BMI 25.5 kg/m2         PE  GEN - Awake, alert, communicating appropriately. NAD  Pulm - CTAB  CV - RRR  Abd - soft, NT, ND  Ext - warm, well perfused. Labs  None    7/25/17 PET/CT: IMPRESSION: Increased tracer activity in the gastroesophageal junction and  stomach as described above compatible with the known neoplasm. No PET avid  evidence of metastatic disease. I have reviewed the films and agree with this impression. Assessment     Alysia Tapia is a 80 y. 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 He has no evidence of metastatic disease. He is losing weight and malnourished. He is also constipated. Plan     GE junction adenocarcinoma: the patient will see Dr. Mateus Harry of Oncology tomorrow. He is considering pursuing definitive chemotherapy/radiation rather than surgery due to the extensive nature of surgery that would be required. He will discuss this further with Dr. Mateus Harry. He does appear at this time to have resectable disease but neoadjuvant therapy would be preferred in this case.   If he decides to proceed with chemotherapy, I will be happy to place a port for him. PO intolerance: he is eating but continues to lose weight. He will likely require intervention for this. He is interested in a stent that was offered by Dr. Rachel Narvaez. Alternatively, a PEG, G-tube or J-tube would be options if needed for nutrition. Constipation: I have recommended an enema and laxative regimen for this. He is taking daily miralax currently with little relief. I will plan to see him back as needed or for port placement. 30 mins of time was spent with the patient of which > 50% of the time involved face-to-face counseling of the patient regarding the proposed treatment plan.       Lorir Brenner MD  7/27/2017    CC: MD Dr. Bon Monteiro Dr.

## 2017-07-27 NOTE — PROGRESS NOTES
1. Have you been to the ER, urgent care clinic since your last visit? Hospitalized since your last visit? No    2. Have you seen or consulted any other health care providers outside of the [facility] since your last visit? Include any pap smears or colon screening. No     Patient is here with his daughter. Daughter states they are here to discuss PET Scan results. Daughter also states they were unable to do the EUS because they couldn't get past the tumor.

## 2017-07-28 NOTE — PROGRESS NOTES
[facility] info on weekly carbo/taxol and Chemotherapy and You booklet given to patient and family for review. Aware of need to schedule chemo teach next week. Support given.

## 2017-07-28 NOTE — PATIENT INSTRUCTIONS
Stop all aspirin,motrin, and aleve until after stent placed. Make appointment for chemo teach next week by calling [phone].

## 2017-07-28 NOTE — PROGRESS NOTES
Hematology/Oncology Consult    REASON FOR CONSULT: GE junction adenocarcinoma  REQUESTED BY: Dr. Carli Magdaleno: Mr. Jeanette Espinoza is a 80 y.o. 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 He has had a dull epigastric abdominal pain ×6-7 months with progressive worsening. Pian does not radiate. He thought this is due to PUD and made dietary changes, without improvement. Pain was aggravated by solid food and gradually progressed to intolerance for liquids. He also reported a 20 pound weight loss over the past several months with fatigue. He was seen by GI Dr. Daljit Feldman who performed an EGD on 6/28/17. Findings were notable for a 3-4 cm friable mass distal to the GE junction and extending down the cardia. 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 CT chest abdomen and pelvis done on 7/6/17 did not show any evidence of metastatic disease. His PET scan on 7/25/17 showed no evidence of metastatic disease. Unfortunately his tumor prevented passage of the EUS scope and thus it was not able to be completed. Now pain is constant and worse on eating or drinking, it radiates to his right. He has had no CP, SOB. He has been constipated, last BM was was yesterday. BMs are dark. He occasionally has emesis , no nausea, no bleeding. He has had trouble swallowing. Denies HA, rashes, vision changes. He has been active and independent all his life, however lately been staying with his daughter. He has stable neuropathy in his feet and takes Neurontin. Quit smoking 30 years ago. No ETOH.        Past Medical History:   Diagnosis Date    Acid reflux     Arthritis     right  left knees    Back pain, chronic 4/11/2014    Cancer ([facility] [address] 2017    esophegeal    Decreased hearing of both ears 4/5/2017    DNR (do not resuscitate) 4/5/2017    Gastroesophageal reflux disease without esophagitis 6/13/2017  GERD (gastroesophageal reflux disease)     Hip pain, left 4/11/2014    Hypercholesterolemia     Hypertension     Need for varicella vaccine 4/11/2014    Rash of entire body 9/15/2016    Restless leg syndrome        Past Surgical History:   Procedure Laterality Date    ABDOMEN SURGERY PROC UNLISTED  1980's    gall bladder    ABDOMEN SURGERY PROC UNLISTED  15 yrs ago    right hernia repair    HX HEENT      EYE SURGERY 40 YRS AGO.  HX ORTHOPAEDIC      fluid removed both knees    HX ORTHOPAEDIC  2010    bilateral knee replacements       No Known Allergies    Current Outpatient Prescriptions   Medication Sig Dispense Refill    OTHER Daughter states patient is taking polyglycol      loratadine (CLARITIN) 10 mg tablet Take 1 Tab by mouth daily. 30 Tab 4    pantoprazole (PROTONIX) 40 mg tablet Take 1 Tab by mouth daily. One tab 30min before meal twice daily  Indications: Duodenal Ulcer, gastroesophageal reflux disease, HEARTBURN, Upper GI Bleed 60 Tab 0    finasteride (PROSCAR) 5 mg tablet Take 1 Tab by mouth daily. Indications: SYMPTOMATIC BENIGN PROSTATIC HYPERPLASIA 30 Tab 6    lisinopril (PRINIVIL, ZESTRIL) 5 mg tablet TAKE 1 TAB BY MOUTH DAILY. 30 Tab 6    chlorpheniramine (ALLERGY RELIEF,CHLORPHENIRAMN,) 4 mg tablet TAKE 1 TABLET BY MOUTH TWICE A DAY AS NEEDED FOR ALLERGIES OR RHINITIS 60 Tab 1    sucralfate (CARAFATE) 100 mg/mL suspension Take 5 mL by mouth four (4) times daily. Indications: Duodenal Ulcer 414 mL 2    hydrocortisone (ANUSOL-HC) 2.5 % rectal cream Insert  into rectum four (4) times daily. 30 g 6    gabapentin (NEURONTIN) 800 mg tablet TAKE 1 TABLET BY MOUTH 3 TIMES A DAY 90 Tab 4    VITAMIN B-12 1,000 mcg tablet TAKE 1 TABLET BY MOUTH EVERY DAY  6    polyethylene glycol (MIRALAX) 17 gram/dose powder DISSOLVE 1 SCOOP IN THE WATER ONCE A DAY  2    calcium-cholecalciferol, D3, (CALTRATE 600+D) tablet Take 1 Tab by mouth two (2) times a day.  60 Tab 11    cyanocobalamin 1,000 mcg tablet TAKE 1 TABLET BY MOUTH EVERY DAY 30 Tab 6    tamsulosin (FLOMAX) 0.4 mg capsule TAKE 2 CAPS BY MOUTH DAILY. 60 Cap 6    permethrin (ACTICIN) 5 % topical cream apply sparingly as directed, apply sparingly from the forehead hairlines all over the entire body including the soles of the feet and between the toes, leave on for 12 hrs then rinse off, repeat the same in 3 days 60 g 0    famotidine (PEPCID) 20 mg tablet Take 1 Tab by mouth two (2) times a day. 60 Tab 0    ondansetron (ZOFRAN ODT) 4 mg disintegrating tablet Take 1 Tab by mouth every eight (8) hours as needed for Nausea. 10 Tab 0    ranitidine (ZANTAC) 150 mg tablet Take 150 mg by mouth two (2) times a day.  fluticasone (FLONASE) 50 mcg/actuation nasal spray 2 puffs daily  Indications: ALLERGIC CONJUNCTIVITIS, ALLERGIC RHINITIS 1 Bottle 6    psyllium (METAMUCIL) packet Take 1 Packet by mouth two (2) times a day. Hold for Loose bowl Movement 60 Packet 11    HYDROcodone-ibuprofen (VICOPROFEN) 7.5-200 mg per tablet Take 1 Tab by mouth nightly as needed for Pain. 30 Tab 0    capsaicin 0.1 % topical cream Apply  to affected area three (3) times daily. 43 g 5    olopatadine (PATANOL) 0.1 % ophthalmic solution Administer 2 Drops to both eyes two (2) times a day. 10 mL 11    aspirin 81 mg tablet Take 1 Tab by mouth daily.  27 Tab 6       Social History     Social History    Marital status:      Spouse name: N/A    Number of children: N/A    Years of education: N/A     Social History Main Topics    Smoking status: Former Smoker     Years: 0.00     Quit date: 4/28/1970    Smokeless tobacco: Never Used    Alcohol use Yes      Comment: OCCASSIONALLY    Drug use: No    Sexual activity: Not Currently     Other Topics Concern    Not on file     Social History Narrative       Family History   Problem Relation Age of Onset    Heart Disease Sister        ROS  A 12 point review of systems was obtained and is negative except as listed in HPI.  ECOG PS is 1    Physical Examination:   Visit Vitals    Ht 6' (1.829 m)     General appearance - Tired, and in no distress  Mental status - oriented to person, place, and time  Mouth - mucous membranes moist, pharynx normal without lesions  Neck - supple, no significant adenopathy  Lymphatics - no palpable lymphadenopathy, no hepatosplenomegaly  Chest - clear to auscultation, no wheezes, rales or rhonchi, symmetric air entry  Heart - normal rate, regular rhythm, normal S1, S2, no murmurs, rubs, clicks or gallops  Abdomen - soft,tender epigastrium  Back exam - full range of motion, no tenderness, palpable spasm or pain on motion  Neurological - normal speech, no focal findings or movement disorder noted  Musculoskeletal - no joint tenderness, deformity or swelling  Extremities - peripheral pulses normal, no pedal edema, no clubbing or cyanosis  Skin - normal coloration and turgor, no rashes, no suspicious skin lesions noted    LABS  Lab Results   Component Value Date/Time    WBC 7.1 06/13/2017 08:53 AM    HGB 13.8 06/13/2017 08:53 AM    HCT 40.7 06/13/2017 08:53 AM    PLATELET 639 80/79/1816 08:53 AM    MCV 97 06/13/2017 08:53 AM    ABS. NEUTROPHILS 5.3 06/06/2016 05:59 AM     Lab Results   Component Value Date/Time    Sodium 137 06/13/2017 08:53 AM    Potassium 4.4 06/13/2017 08:53 AM    Chloride 97 06/13/2017 08:53 AM    CO2 23 06/13/2017 08:53 AM    Glucose 99 06/13/2017 08:53 AM    BUN 21 06/13/2017 08:53 AM    Creatinine 1.68 06/13/2017 08:53 AM    GFR est AA 42 06/13/2017 08:53 AM    GFR est non-AA 36 06/13/2017 08:53 AM    Calcium 10.0 06/13/2017 08:53 AM     Lab Results   Component Value Date/Time    AST (SGOT) 16 06/13/2017 08:53 AM    Alk. phosphatase 123 06/13/2017 08:53 AM    Protein, total 6.9 06/13/2017 08:53 AM    Albumin 4.5 06/13/2017 08:53 AM    Globulin 3.6 06/07/2016 07:06 AM    A-G Ratio 1.9 06/13/2017 08:53 AM       PATHOLOGY     FINAL PATHOLOGIC DIAGNOSIS   1.  Stomach, antrum and body, biopsy   Antral-type mucosa with intestinal metaplasia and mild chronic superficial gastritis   No H. pylori organisms identified by H&E   2. Esophagus, GE junction, biopsy   Poorly differentiated adenocarcinoma with signet ring cell features       IMAGING  PET CT 7/25/17     IMPRESSION  IMPRESSION: Increased tracer activity in the gastroesophageal junction and  stomach as described above compatible with the known neoplasm. No PET avid  evidence of metastatic disease.     ASSESSMENT  Mr. Luis Carlos Torres is a 80 y.o. male with  1. 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 EUS could not be completed due to mechanical interference from the mass hence accurate staging is not available. 2. Dysphagia  3. Abdominal pain  4. CKD Stage III    DISCUSSION  Reviewed pathology and staging. He has declined curative surgery, given his age, that is a reasonable decision. We discussed chemoradiation with the goal of improving both local and systemic control of disease. Went over treatment per the phase III CROSS trial which compared outcomes for potentially resectable GEJ Ca with chemoXRT vs Sx. Chemotherapy used in this study was weekly Carboplatin and Taxol. At a median follow-up of 32 months, overall survival was significantly better with preoperative chemoradiotherapy (HR for death 0.657, 95% CI 0.495-0.871, three-year survival rate 58 versus 44 percent). The survival benefit persisted with longer (median 84 month) follow-up (five-year survival 47 versus 33 percent, HR for death 0.67, 95% CI 0.51-0.87    We discussed the chemotherapy regimen, it's logistics, and potential toxicities in detail. Potential side effects include, but are not limited to, nausea, vomiting, diarrhea, taste changes, myelosuppression, infection, fatigue, allergic reactions, rash, edema, neuropathy, and rarely, death.  The patient asked several well thought out questions which I answered to the best of my ability and to their apparent satisfaction. We discussed options for his dysphagia and pain- which include POEG placement vs Esophageal stent. He opted for the latter. Understands complications associated with both. PLAN    - Refer to Dr. Park Castelan for stent placement, met with dietician today  - Port placement per Dr. Emmett Hernandez  - D/W Dr. Lorelei Walters with Rad Onc- Referral made  - Plan to start Cycle 1 in the next week if all the above done    Discussed with Dr. Sonia Turk and patients daughter. Durga Ruff MD    Mr. Alfredito Antonio has a reminder for a \"due or due soon\" health maintenance. I have asked that he contact his primary care provider for follow-up on this health maintenance.

## 2017-07-28 NOTE — MR AVS SNAPSHOT
Visit Information Date & Time Provider Department Dept. Phone Encounter #  
 7/28/2017  1:00 PM Kaitlyn Fowler MD [facility] Oncology at [address] [id number] Your Appointments 8/8/2017  2:00 PM  
Follow Up with Kaitlyn Fowler MD  
[facility] Oncology at [facility]) Appt Note: 10 day f/u- GE junction carcinoma [address] [facility] [address] [place] [address]  
[phone]  
  
   
 [address] [facility] [address]  
  
    
 9/13/2017 11:00 AM  
ROUTINE CARE with Wolfgang Hawley MD  
[address] [place] [address] OFFICE-ANNEX ([address]) Appt Note: 3 mo f/u; r/s  
 [address] [facility] [address] [address]  
[phone] [address] [address]  
  
    
 10/5/2017  3:00 PM  
ROUTINE CARE with Wolfgang Hawley MD  
[facility] OFFICE-ANNEX ([address]) Appt Note: 6 mnth fu  
 [address] [facility] [address] [address]  
[phone] Upcoming Health Maintenance Date Due  
 GLAUCOMA SCREENING Q2Y 3/13/2016 INFLUENZA AGE 9 TO ADULT 8/1/2017 MEDICARE YEARLY EXAM 4/6/2018 DTaP/Tdap/Td series (2 - Td) 4/11/2024 Allergies as of 7/28/2017  Review Complete On: 7/28/2017 By: Howie Crenshaw No Known Allergies Current Immunizations  Reviewed on 8/6/2015 Name Date Influenza High Dose Vaccine PF 9/15/2016, 12/7/2015 Influenza Vaccine 4/11/2014 Influenza Vaccine PF 10/9/2013 Pneumococcal Conjugate (PCV-13) 8/6/2015 Pneumococcal Polysaccharide (PPSV-23) 4/11/2014 Tdap 4/11/2014 Not reviewed this visit You Were Diagnosed With   
  
 Codes Comments GE junction carcinoma ([facility] [address]    -  Primary ICD-10-CM: C16.0 ICD-9-CM: 151.0 Vitals BP Pulse Temp Resp Height(growth percentile) Weight(growth percentile) 106/79 89 97 °F (36.1 °C) 20 6' (1.829 m) 183 lb 3.2 oz (83.1 kg) SpO2 BMI Smoking Status 97% 24.85 kg/m2 Former Smoker Vitals History BMI and BSA Data Body Mass Index Body Surface Area  
 24.85 kg/m 2 2.05 m 2 Preferred Pharmacy Pharmacy Name Phone [id number] [facility] [phone] Your Updated Medication List  
  
   
This list is accurate as of: 7/28/17  2:43 PM.  Always use your most recent med list.  
  
  
  
  
 calcium-cholecalciferol (D3) tablet Commonly known as:  CALTRATE 600+D Take 1 Tab by mouth two (2) times a day. capsaicin 0.1 % topical cream  
Commonly known as:  CAPZASIN-HP Apply  to affected area three (3) times daily. chlorpheniramine 4 mg tablet Commonly known as:  ALLERGY RELIEF(CHLORPHENIRAMN) TAKE 1 TABLET BY MOUTH TWICE A DAY AS NEEDED FOR ALLERGIES OR RHINITIS * cyanocobalamin 1,000 mcg tablet TAKE 1 TABLET BY MOUTH EVERY DAY  
  
 * VITAMIN B-12 1,000 mcg tablet Generic drug:  cyanocobalamin ER  
TAKE 1 TABLET BY MOUTH EVERY DAY  
  
 famotidine 20 mg tablet Commonly known as:  PEPCID Take 1 Tab by mouth two (2) times a day. finasteride 5 mg tablet Commonly known as:  PROSCAR Take 1 Tab by mouth daily. Indications: SYMPTOMATIC BENIGN PROSTATIC HYPERPLASIA  
  
 fluticasone 50 mcg/actuation nasal spray Commonly known as:  Leafy Few 2 puffs daily  Indications: ALLERGIC CONJUNCTIVITIS, ALLERGIC RHINITIS  
  
 gabapentin 800 mg tablet Commonly known as:  NEURONTIN  
TAKE 1 TABLET BY MOUTH 3 TIMES A DAY  
  
 hydrocortisone 2.5 % rectal cream  
Commonly known as:  ANUSOL-HC Insert  into rectum four (4) times daily. lisinopril 5 mg tablet Commonly known as:  PRINIVIL, ZESTRIL  
TAKE 1 TAB BY MOUTH DAILY. loratadine 10 mg tablet Commonly known as:  Reginald Moulder Take 1 Tab by mouth daily. olopatadine 0.1 % ophthalmic solution Commonly known as:  PATANOL Administer 2 Drops to both eyes two (2) times a day. ondansetron 4 mg disintegrating tablet Commonly known as:  ZOFRAN ODT Take 1 Tab by mouth every eight (8) hours as needed for Nausea. OTHER Daughter states patient is taking polyglycol  
  
 pantoprazole 40 mg tablet Commonly known as:  PROTONIX Take 1 Tab by mouth daily. One tab 30min before meal twice daily  Indications: Duodenal Ulcer, gastroesophageal reflux disease, HEARTBURN, Upper GI Bleed  
  
 permethrin 5 % topical cream  
Commonly known as:  ACTICIN  
apply sparingly as directed, apply sparingly from the forehead hairlines all over the entire body including the soles of the feet and between the toes, leave on for 12 hrs then rinse off, repeat the same in 3 days  
  
 polyethylene glycol 17 gram/dose powder Commonly known as:  Santosh Holster DISSOLVE 1 SCOOP IN THE WATER ONCE A DAY  
  
 psyllium packet Commonly known as:  METAMUCIL Take 1 Packet by mouth two (2) times a day. Hold for Loose bowl Movement  
  
 raNITIdine 150 mg tablet Commonly known as:  ZANTAC Take 150 mg by mouth two (2) times a day. sucralfate 100 mg/mL suspension Commonly known as:  Anya  Take 5 mL by mouth four (4) times daily. Indications: Duodenal Ulcer  
  
 tamsulosin 0.4 mg capsule Commonly known as:  FLOMAX TAKE 2 CAPS BY MOUTH DAILY. traMADol 50 mg tablet Commonly known as:  ULTRAM  
Take 1 Tab by mouth every six (6) hours as needed for Pain. Max Daily Amount: 200 mg. Indications: Pain * Notice: This list has 2 medication(s) that are the same as other medications prescribed for you. Read the directions carefully, and ask your doctor or other care provider to review them with you. Prescriptions Printed Refills  
 traMADol (ULTRAM) 50 mg tablet 0 Sig: Take 1 Tab by mouth every six (6) hours as needed for Pain. Max Daily Amount: 200 mg. Indications: Pain Class: Print  Route: Oral  
  
 To-Do List   
 07/31/2017 Outpatient Referral:  REFERRAL TO RADIATION ONCOLOGY Referral Information Referral ID Referred By Referred To  
  
 [id number] Lamont Zamora MD   
   [address] [facility] [address], [place] Phone: [phone] Fax: [phone] Visits Status Start Date End Date 1 New Request 7/28/17 7/28/18 If your referral has a status of pending review or denied, additional information will be sent to support the outcome of this decision. Patient Instructions Stop all aspirin,motrin, and aleve until after stent placed. Make appointment for chemo teach next week by calling [phone]. Introducing [facility]! Haris Noel introduces [employer] patient portal. Now you can access parts of your medical record, email your doctor's office, and request medication refills online. 1. In your internet browser, go to [email or web address] 2. Click on the First Time User? Click Here link in the Sign In box. You will see the New Member Sign Up page. 3. Enter your [employer] Access Code exactly as it appears below. You will not need to use this code after youve completed the sign-up process. If you do not sign up before the expiration date, you must request a new code. · [employer] Access Code: [email or web address] Expires: 10/21/2017  2:34 PM 
 
4. Enter the last four digits of your Social Security Number (xxxx) and Date of Birth (mm/dd/yyyy) as indicated and click Submit. You will be taken to the next sign-up page. 5. Create a [employer] ID. This will be your [employer] login ID and cannot be changed, so think of one that is secure and easy to remember. 6. Create a [employer] password. You can change your password at any time. 7. Enter your Password Reset Question and Answer. This can be used at a later time if you forget your password. 8. Enter your e-mail address.  You will receive e-mail notification when new information is available in [facility]. 9. Click Sign Up. You can now view and download portions of your medical record. 10. Click the Download Summary menu link to download a portable copy of your medical information. If you have questions, please visit the Frequently Asked Questions section of the [facility] website. Remember, [facility] is NOT to be used for urgent needs. For medical emergencies, dial 911. Now available from your iPhone and Android! Please provide this summary of care documentation to your next provider. Your primary care clinician is listed as Sandra Ritter. If you have any questions after today's visit, please call [phone].

## 2017-07-31 PROBLEM — R52 PAIN: Status: ACTIVE | Noted: 2017-01-01

## 2017-07-31 PROBLEM — R13.19 ESOPHAGEAL DYSPHAGIA: Status: ACTIVE | Noted: 2017-01-01

## 2017-07-31 NOTE — PROGRESS NOTES

## 2017-07-31 NOTE — ROUTINE PROCESS
Joy Chen .  7/29/1931  [id number]    Situation:  Verbal report received from: Angie Covington RN  Procedure: Procedure(s):  ESOPHAGOGASTRODUODENOSCOPY (EGD)  ENDOSCOPY WITH PROSTHESIS OR STENT PLACEMENT    Background:    Preoperative diagnosis: Esophageal CA  Postoperative diagnosis: 1.- G E Junction Tumor  2.- Stent Placement    :  Dr. Mer Toney  Assistant(s): Endoscopy Technician-1: Jeanann Ormond  Endoscopy RN-1: Maye Mane RN    Specimens: * No specimens in log *  H. Pylori  no    Assessment:  Intra-procedure medications     Anesthesia gave intra-procedure sedation and medications, see anesthesia flow sheet yes    Intravenous fluids: NS@ KVO     Vital signs stable     Abdominal assessment: round and soft     Recommendation:  Discharge patient per MD order.     Family or Friend   Permission to share finding with family or friend yes

## 2017-07-31 NOTE — H&P
Shantelle [address]  [address] [place]  [place], [address]  [phone]                                History and Physical     NAME: Werner Sylvester Sr.   :  1931   MRN:  [id number]     HPI:  The patient was seen and examined. Past Surgical History:   Procedure Laterality Date    ABDOMEN SURGERY PROC UNLISTED      gall bladder    ABDOMEN SURGERY PROC UNLISTED  15 yrs ago    right hernia repair    HX HEENT      EYE SURGERY 40 YRS AGO.     HX ORTHOPAEDIC      fluid removed both knees    HX ORTHOPAEDIC  2010    bilateral knee replacements     Past Medical History:   Diagnosis Date    Acid reflux     Arthritis     right  left knees    Back pain, chronic 2014    Cancer ([address]     esophegeal    Decreased hearing of both ears 2017    DNR (do not resuscitate) 2017    Gastroesophageal reflux disease without esophagitis 2017    GERD (gastroesophageal reflux disease)     Hip pain, left 2014    Hypercholesterolemia     Hypertension     Need for varicella vaccine 2014    Rash of entire body 9/15/2016    Restless leg syndrome      Social History   Substance Use Topics    Smoking status: Former Smoker     Years: 0.00     Quit date: 1970    Smokeless tobacco: Never Used    Alcohol use Yes      Comment: OCCASSIONALLY     No Known Allergies  Family History   Problem Relation Age of Onset    Heart Disease Sister      Current Facility-Administered Medications   Medication Dose Route Frequency    0.9% sodium chloride infusion  50 mL/hr IntraVENous CONTINUOUS    sodium chloride (NS) flush 5-10 mL  5-10 mL IntraVENous Q8H    sodium chloride (NS) flush 5-10 mL  5-10 mL IntraVENous PRN    midazolam (VERSED) injection 0.25-10 mg  0.25-10 mg IntraVENous Multiple    fentaNYL citrate (PF) injection 100 mcg  100 mcg IntraVENous MULTIPLE DOSE GIVEN    naloxone (NARCAN) injection 0.4 mg  0.4 mg IntraVENous Multiple    flumazenil (ROMAZICON) 0.1 mg/mL injection 0.2 mg  0.2 mg IntraVENous Multiple    simethicone (MYLICON) 45ZT/6.3TW oral drops 80 mg  1.2 mL Oral Multiple    atropine injection 0.5 mg  0.5 mg IntraVENous ONCE PRN    EPINEPHrine (ADRENALIN) 0.1 mg/mL syringe 1 mg  1 mg Endoscopically ONCE PRN         PHYSICAL EXAM:  General: WD, WN. Alert, cooperative, no acute distress    HEENT: NC, Atraumatic. PERRLA, EOMI. Anicteric sclerae. Lungs:  CTA Bilaterally. No Wheezing/Rhonchi/Rales. Heart:  Regular  rhythm,  No murmur, No Rubs, No Gallops  Abdomen: Soft, Non distended, Non tender.  +Bowel sounds, no HSM  Extremities: No c/c/e  Neurologic:  CN 2-12 gi, Alert and oriented X 3. No acute neurological distress   Psych:   Good insight. Not anxious nor agitated. The heart, lungs and mental status were satisfactory for the administration of MAC sedation and for the procedure.       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

## 2017-07-31 NOTE — DISCHARGE INSTRUCTIONS
[address] [facility].  [address] [place] [phone]                     [place] [facility] 9881  [id number]  7/29/1931    DISCOMFORT:  Sore throat- throat lozenges or warm salt water gargle  redness at IV site- apply warm compress to area; if redness or soreness persist- contact your physician  Gaseous discomfort- walking, belching will help relieve any discomfort  You may not operate a vehicle for 12 hours  You may not engage in an occupation involving machinery or appliances for rest of today  You may not drink alcoholic beverages for at least 12 hours  Avoid making any critical decisions for at least 24 hour  DIET  You may eat and drink after you leave. You may resume your regular diet - however -  remember your colon is empty and a heavy meal will produce gas. Avoid these foods:  vegetables, fried / greasy foods, carbonated drinks    ACTIVITY  You may resume your normal daily activities   Spend the remainder of the day resting -  avoid any strenuous activity. CALL M.D. ANY SIGN OF   Increasing pain, nausea, vomiting  Abdominal distension (swelling)  New increased bleeding (oral or rectal)  Fever (chills)  Pain in chest area  Bloody discharge from nose or mouth  Shortness of breath    Follow-up Instructions:   Call Dr. Imani Fang for any questions or problems. If we took a biopsy please call the office within 2 weeks to discuss your                             pathology results. Telephone # [phone]        Continue same medications.

## 2017-07-31 NOTE — TELEPHONE ENCOUNTER
Call placed to patients daughter, HIPAA verified. Patients daughter updated on appointment with radiation oncology, Dr. Kieran Mike on 8/3/17 at 1 am. Verbalized understanding and thanked for call.

## 2017-07-31 NOTE — PROCEDURES
118 [facility].  [address] [facility] 140 [facility], [address]  [phone]                            NAME:  Ric Inman Sr.   :   1931   MRN:   [id number]     Date/Time:  2017 4:11 PM    Esophagogastroduodenoscopy (EGD) Procedure Note    :  Corine Ibrahim MD    Referring Provider:  Magdy Church MD    Anethesia/Sedation:  MAC anesthesia    Procedure Details     After infom consent was obtained for the procedure, with all risks and benefits of procedure explained the patient was taken to the endoscopy suite and placed in the left lateral decubitus position. Following sequential administration of sedation as per above, the [id number] gastroscope was inserted into the mouth and advanced under direct vision to stomach. A careful inspection was made as the gastroscope was withdrawn, including a retroflexed view of the proximal stomach; findings and interventions are described below. Findings:  Esophagus: A tight stricture was seen immediately distal to the GE junction. This was traversed with slight difficulty. Stomach: An ulcerated, circumferential, stenosing and friable mass was seen immediately distal to the GE junction. Mass was seen in the gastric cardia and extending into the fundus. Mass was about 3-4 cm in size. There was patchy congestion and erythema in the mucosa of gastric body and antrum    Therapies:  A stiff shaft Dreamwire 0.035 in was placed under direct visualization in the antrum. A metallic yusuf was placed at the site of the stricture. A fully  covered [employer] stent 105 cm in length and 23 mm in diameter was placed under fluoroscopic guidance over the guidewire. Stent was in good position. Specimens: none           EBL: None    Complications:   None; patient tolerated the procedure well. Impression:    See Postoperative diagnosis above    Recommendations:  -Continue acid suppression. , -Follow symptoms. ,   -Esophageal stent diet (Diet chart provided)  -Follow up with Oncology and Radiation Onc as scheduled  -Follow up with me in 6-8 weeks    Discharge disposition:  Home in the company of  when able to ambulate    Jaswant Bañuelos MD

## 2017-07-31 NOTE — IP AVS SNAPSHOT
[address] 
[phone] Patient: Tamiko Mccalin Sr. MRN: [id number] You are allergic to the following No active allergies Recent Documentation Smoking Status Former Smoker Emergency Contacts Name Discharge Info Relation Home Work Mobile [facility] DISCHARGE CAREGIVER [3] Daughter [21] [phone] [facility]  Child [2] [phone] About your hospitalization You were admitted on:  July 31, 2017 You last received care in the:  [facility] ENDOSCOPY You were discharged on:  July 31, 2017 Unit phone number:  [phone] Why you were hospitalized Your primary diagnosis was:  Not on File Providers Seen During Your Hospitalizations Provider Role Specialty Primary office phone Sparkle Duarte MD Attending Provider Gastroenterology [phone] Your Primary Care Physician (PCP) Primary Care Physician Office Phone Office Fax Maralysia Jennifer [phone] Follow-up Information None Your Appointments Wednesday August 02, 2017 INFUSAPORT INSERTION with Mari Villarreal MD  
[facility] SURGERY (RI OR PRE ASSESSMENT) [address]  
[phone] Thursday August 03, 2017  8:30 AM EDT  
(Arrive by 6:30 AM) RADIATION ONCOLOGY with RAD ONC THERAPY [facility] RAD THERAPY AT [facility]) [address]  
[phone] Tuesday August 08, 2017  2:00 PM EDT Follow Up with Sherlyn Medrano MD  
[facility] MIGUEL Oncology at [facility]) 217 [facility] 209 [address] [place] [address]  
[phone] Wednesday September 13, 2017 11:00 AM EDT ROUTINE CARE with Van Shetty MD  
[facility] OFFICE-ANNEX ([facility]) [address] [facility] [address] [phone]  
[phone] Current Discharge Medication List  
  
CONTINUE these medications which have NOT CHANGED Dose & Instructions Dispensing Information Comments Morning Noon Evening Bedtime  
 calcium-cholecalciferol (D3) tablet Commonly known as:  CALTRATE 600+D Your last dose was: Your next dose is:    
   
   
 Dose:  1 Tab Take 1 Tab by mouth two (2) times a day. Quantity:  60 Tab Refills:  11  
     
   
   
   
  
 capsaicin 0.1 % topical cream  
Commonly known as:  CAPZASIN-HP Your last dose was: Your next dose is:    
   
   
 Apply  to affected area three (3) times daily. Quantity:  43 g Refills:  5  
     
   
   
   
  
 chlorpheniramine 4 mg tablet Commonly known as:  ALLERGY RELIEF(CHLORPHENIRAMN) Your last dose was: Your next dose is: TAKE 1 TABLET BY MOUTH TWICE A DAY AS NEEDED FOR ALLERGIES OR RHINITIS Quantity:  60 Tab Refills:  1  
     
   
   
   
  
 famotidine 20 mg tablet Commonly known as:  PEPCID Your last dose was: Your next dose is:    
   
   
 Dose:  20 mg Take 1 Tab by mouth two (2) times a day. Quantity:  60 Tab Refills:  0  
     
   
   
   
  
 finasteride 5 mg tablet Commonly known as:  PROSCAR Your last dose was: Your next dose is:    
   
   
 Dose:  5 mg Take 1 Tab by mouth daily. Indications: SYMPTOMATIC BENIGN PROSTATIC HYPERPLASIA Quantity:  30 Tab Refills:  6  
     
   
   
   
  
 fluticasone 50 mcg/actuation nasal spray Commonly known as:  Caroline Fischer Your last dose was: Your next dose is:    
   
   
 2 puffs daily  Indications: ALLERGIC CONJUNCTIVITIS, ALLERGIC RHINITIS Quantity:  1 Bottle Refills:  6  
     
   
   
   
  
 gabapentin 800 mg tablet Commonly known as:  NEURONTIN Your last dose was: Your next dose is: TAKE 1 TABLET BY MOUTH 3 TIMES A DAY Quantity:  90 Tab Refills:  4  
     
   
   
   
  
 hydrocortisone 2.5 % rectal cream  
Commonly known as:  ANUSOL-HC Your last dose was: Your next dose is: Insert  into rectum four (4) times daily. Quantity:  30 g Refills:  6  
     
   
   
   
  
 lisinopril 5 mg tablet Commonly known as:  Therisa Semen Your last dose was: Your next dose is: TAKE 1 TAB BY MOUTH DAILY. Quantity:  30 Tab Refills:  6  
     
   
   
   
  
 loratadine 10 mg tablet Commonly known as:  Lea Fleischer Your last dose was: Your next dose is:    
   
   
 Dose:  10 mg Take 1 Tab by mouth daily. Quantity:  30 Tab Refills:  4  
     
   
   
   
  
 olopatadine 0.1 % ophthalmic solution Commonly known as:  PATANOL Your last dose was: Your next dose is:    
   
   
 Dose:  2 Drop Administer 2 Drops to both eyes two (2) times a day. Quantity:  10 mL Refills:  11  
     
   
   
   
  
 ondansetron 4 mg disintegrating tablet Commonly known as:  ZOFRAN ODT Your last dose was: Your next dose is:    
   
   
 Dose:  4 mg Take 1 Tab by mouth every eight (8) hours as needed for Nausea. Quantity:  10 Tab Refills:  0  
     
   
   
   
  
 OTHER Your last dose was: Your next dose is:    
   
   
 Daughter states patient is taking polyglycol Refills:  0  
     
   
   
   
  
 pantoprazole 40 mg tablet Commonly known as:  PROTONIX Your last dose was: Your next dose is:    
   
   
 Dose:  40 mg Take 1 Tab by mouth daily. One tab 30min before meal twice daily  Indications: Duodenal Ulcer, gastroesophageal reflux disease, HEARTBURN, Upper GI Bleed Quantity:  60 Tab Refills:  0  
     
   
   
   
  
 permethrin 5 % topical cream  
Commonly known as:  ACTICIN Your last dose was: Your next dose is: apply sparingly as directed, apply sparingly from the forehead hairlines all over the entire body including the soles of the feet and between the toes, leave on for 12 hrs then rinse off, repeat the same in 3 days Quantity:  60 g Refills:  0  
     
   
   
   
  
 polyethylene glycol 17 gram/dose powder Commonly known as:  Cara Mule Your last dose was: Your next dose is:    
   
   
 DISSOLVE 1 SCOOP IN THE WATER ONCE A DAY Refills:  2  
     
   
   
   
  
 raNITIdine 150 mg tablet Commonly known as:  ZANTAC Your last dose was: Your next dose is:    
   
   
 Dose:  150 mg Take 150 mg by mouth two (2) times a day. Refills:  0  
     
   
   
   
  
 sucralfate 100 mg/mL suspension Commonly known as:  [place] Your last dose was: Your next dose is:    
   
   
 Dose:  1 tsp Take 5 mL by mouth four (4) times daily. Indications: Duodenal Ulcer Quantity:  414 mL Refills:  2  
     
   
   
   
  
 tamsulosin 0.4 mg capsule Commonly known as:  FLOMAX Your last dose was: Your next dose is: TAKE 2 CAPS BY MOUTH DAILY. Quantity:  60 Cap Refills:  6  
     
   
   
   
  
 traMADol 50 mg tablet Commonly known as:  ULTRAM  
   
Your last dose was: Your next dose is:    
   
   
 Dose:  50 mg Take 1 Tab by mouth every six (6) hours as needed for Pain. Max Daily Amount: 200 mg. Indications: Pain Quantity:  120 Tab Refills:  0  
     
   
   
   
  
 VITAMIN B-12 1,000 mcg tablet Generic drug:  cyanocobalamin ER Your last dose was: Your next dose is: TAKE 1 TABLET BY MOUTH EVERY DAY Refills:  6 Discharge Instructions Zelda Judge. 
[address] [place] [address],  [address]  
[phone] DISCHARGE INSTRUCTIONS Donavan Romberg 
[id number] 
7/29/1931 DISCOMFORT: 
 Sore throat- throat lozenges or warm salt water gargle 
redness at IV site- apply warm compress to area; if redness or soreness persist- contact your physician Gaseous discomfort- walking, belching will help relieve any discomfort You may not operate a vehicle for 12 hours You may not engage in an occupation involving machinery or appliances for rest of today You may not drink alcoholic beverages for at least 12 hours Avoid making any critical decisions for at least 24 hour DIET You may eat and drink after you leave. You may resume your regular diet  however -  remember your colon is empty and a heavy meal will produce gas. Avoid these foods:  vegetables, fried / greasy foods, carbonated drinks ACTIVITY You may resume your normal daily activities Spend the remainder of the day resting -  avoid any strenuous activity. CALL M.D. ANY SIGN OF Increasing pain, nausea, vomiting Abdominal distension (swelling) New increased bleeding (oral or rectal) Fever (chills) Pain in chest area Bloody discharge from nose or mouth Shortness of breath Follow-up Instructions: 
 Call Dr. Víctor Gallagher for any questions or problems. If we took a biopsy please call the office within 2 weeks to discuss your                             pathology results. Telephone # [phone] Continue same medications. Discharge Orders None Introducing [facility] HE[facility]! [facility] [employer] patient portal. Now you can access parts of your medical record, email your doctor's office, and request medication refills online. 1. In your internet browser, go to [email or web address] 2. Click on the First Time User? Click Here link in the Sign In box. You will see the New Member Sign Up page. 3. Enter your [employer] Access Code exactly as it appears below. You will not need to use this code after youve completed the sign-up process.  If you do not sign up before the expiration date, you must request a new code. · [employer] Access Code: [email or web address] Expires: 10/21/2017  2:34 PM 
 
4. Enter the last four digits of your Social Security Number (xxxx) and Date of Birth (mm/dd/yyyy) as indicated and click Submit. You will be taken to the next sign-up page. 5. Create a [employer] ID. This will be your [employer] login ID and cannot be changed, so think of one that is secure and easy to remember. 6. Create a [employer] password. You can change your password at any time. 7. Enter your Password Reset Question and Answer. This can be used at a later time if you forget your password. 8. Enter your e-mail address. You will receive e-mail notification when new information is available in [address] 9. Click Sign Up. You can now view and download portions of your medical record. 10. Click the Download Summary menu link to download a portable copy of your medical information. If you have questions, please visit the Frequently Asked Questions section of the [employer] website. Remember, [employer] is NOT to be used for urgent needs. For medical emergencies, dial 911. Now available from your iPhone and Android! General Information Please provide this summary of care documentation to your next provider. Patient Signature:  ____________________________________________________________ Date:  ____________________________________________________________  
  
Emily Molina Provider Signature:  ____________________________________________________________ Date:  ____________________________________________________________

## 2017-07-31 NOTE — ANESTHESIA PREPROCEDURE EVALUATION
Anesthetic History   No history of anesthetic complications            Review of Systems / Medical History  Patient summary reviewed, nursing notes reviewed and pertinent labs reviewed    Pulmonary  Within defined limits                 Neuro/Psych   Within defined limits           Cardiovascular    Hypertension              Exercise tolerance: >4 METS     GI/Hepatic/Renal     GERD          Comments: gastrocardia mass Endo/Other        Arthritis and cancer     Other Findings            Physical Exam    Airway  Mallampati: I  TM Distance: > 6 cm  Neck ROM: normal range of motion   Mouth opening: Normal     Cardiovascular    Rhythm: regular  Rate: normal         Dental  No notable dental hx       Pulmonary  Breath sounds clear to auscultation               Abdominal  Abdominal exam normal       Other Findings            Anesthetic Plan    ASA: 3  Anesthesia type: MAC          Induction: Intravenous  Anesthetic plan and risks discussed with: Patient

## 2017-08-01 NOTE — ANESTHESIA POSTPROCEDURE EVALUATION
Post-Anesthesia Evaluation and Assessment    Patient: Ric Inman Sr. MRN: [id number]  SSN: [id number]    YOB: 1931  Age: 80 y.o. Sex: male       Cardiovascular Function/Vital Signs  Visit Vitals    BP (!) 160/95    Pulse 82    Temp 37 °C (98.6 °F)    Resp 18    SpO2 95%       Patient is status post MAC anesthesia for Procedure(s):  ESOPHAGOGASTRODUODENOSCOPY (EGD)  ENDOSCOPY WITH PROSTHESIS OR STENT PLACEMENT. Nausea/Vomiting: None    Postoperative hydration reviewed and adequate. Pain:  Pain Scale 1: Numeric (0 - 10) (07/31/17 1640)  Pain Intensity 1: 0 (07/31/17 1640)   Managed    Neurological Status: At baseline    Mental Status and Level of Consciousness: Arousable    Pulmonary Status:   O2 Device: Room air (07/31/17 1640)   Adequate oxygenation and airway patent    Complications related to anesthesia: None    Post-anesthesia assessment completed.  No concerns    Signed By: Chelle Hart MD     August 1, 2017

## 2017-08-02 NOTE — BRIEF OP NOTE
BRIEF OPERATIVE NOTE    Date of Procedure: 8/2/2017   Preoperative Diagnosis: GE JUNCTION CANCER  Postoperative Diagnosis: GE JUNCTION CANCER  Procedure(s):  Infusaport  Insertion with Fluoroscopy and Ultrasound Guidance  Surgeon(s) and Role:     * Jus Gibbs MD - Primary         Assistant Staff:       Surgical Staff:  Circ-1: Melissa Finley, RN  Scrub RN-1: Carli Huynh RN  Event Time In   Incision Start 0920   Incision Close 1005     Anesthesia: MAC   Estimated Blood Loss: 5 mL  Specimens: * No specimens in log *   Findings: IJ visualized on ultrasound and dark venous blood obtained on needle access to vein. Port positioned in [facility] with fluoroscopy. Port flushed and withdrew easily at completion of case. Complications: None  Implants:   Implant Name Type Inv.  Item Serial No.  Lot No. LRB No. Used Action   PORT INTERMED 8FR POWERPORT --  - SNA   PORT INTERMED 8FR POWERPORT --  NA [employer] [id number] 1 Implanted

## 2017-08-02 NOTE — OP NOTES
[address] [facility] [place] [address]   OP NOTE       Name:  Yumi Hernandez   MR#:  [id number]   :  1931   Account #:  [de-identified]    Surgery Date:  2017   Date of Adm:  2017       PREOPERATIVE DIAGNOSIS: Gastroesophageal junction cancer. POSTOPERATIVE DIAGNOSIS: Gastroesophageal junction cancer. PROCEDURES PERFORMED: Insertion of Port-A-Cath with   fluoroscopic and ultrasound guidance. SURGEON: Omar Espinoza MD.    ANESTHESIA: Monitored care. ESTIMATED BLOOD LOSS: 5 mL. FINDINGS: The patient's right IJ was visualized on ultrasound and   dark venous blood was obtained on needle access to the vein. The port   was positioned in the superior vena cava on fluoroscopy. At the   completion of the case the port flushed and withdrew easily. INDICATIONS: The patient is an 25-year-old gentleman who was   referred to me for a new diagnosis of gastroesophageal junction   cancer. The patient has plans for undergoing treatment with   chemotherapy and thus a port was requested for delivering his   chemotherapy. He presents today for placement of this Port-A-Cath. A   complete discussion of risks, benefits, and alternatives to surgery were   had with the patient and his daughter. They were both in agreement to   proceed and informed consent was obtained. DESCRIPTION OF PROCEDURE: After informed consent was   obtained, the patient was brought back to the operating room. He was   placed under anesthesia monitored care. He was then prepped and   draped in the usual sterile fashion. A proper time-out was performed. At the completion of this the patient was placed in Trendelenburg   positioning with his chin turned to the left. Using a sterile ultrasound,   we identified the right internal jugular vein and carotid artery. The vein   was easily compressible. We then injected the skin and subcutaneous   tissues adjacent to the vein with local anesthetic.  A needle was then   passed through the skin using ultrasound guidance into the internal   jugular vein. Dark venous blood was easily aspirated. We placed a   wire through this down into the superior vena cava. The placement of   the wire was confirmed with fluoroscopy. We then clipped the wire to   the drape and turned our attention to creation of the port pocket. I did   neglect to mention the needle was removed after the wire was placed. Approximately 1 fingerbreadth below the costal margin in the   deltopectoral groove on the right side, we injected the skin and   subcutaneous tissues with local anesthetic, and then made a 2.5 cm   incision using a scalpel. This was carried down to the muscular fascia   overlying the pectoralis muscle and then blunt dissection was used to   create a pocket anterior to the fascia large enough for the placement of   an 8-Martiniquais Port-A-Cath. Once the pocket was created, we placed 3   stay sutures which were 2-0 Prolene sutures in appropriate locations   and clipped these out of the way. We then passed the catheter, which   had previously been flushed with heparinized saline through the pocket   incision on the chest and the subcutaneous tissues over the clavicle   and out through an 11 blade, created a stab incision in the base where   the wire was clipped in place. We then passed the introducer sheath   and dilator over the wire and into the superior vena cava. This position   was confirmed on fluoroscopy as well. The wire and dilator were then   removed. We then passed a catheter down through the introducer   sheath and tore away the introducer sheath. The catheter was then   pulled back to the appropriate distance to where the tip of the catheter   was in the superior vena cava while using fluoroscopy for guidance. We then connected the catheter to the port at the level of the pump   pocket after the catheter was cut to size. This was clipped in place   using the securing cap. The port was then secured to the fascia using   the previously placed stay sutures and placed into the pump pocket   cavity. Final fluoroscopic view was obtained to ensure that there was   no kinking and the catheter was appropriately positioned and this   appeared appropriate. We then flushed the catheter using a Santillan   needle and heparinized saline and this flushed and went through   easily. A heparin lock was placed using 1.5 mL of strong heparin saline   solution. The incision was then closed in 2 layers using interrupted 3-0   Vicryl suture to reapproximate the deep dermis and subcutaneous   tissue, followed by a 4-0 Monocryl subcuticular stitch. Dermabond skin   adhesive was placed over the incision. A single 4-0 Monocryl   interrupted stitch was placed at the stab incision in the neck, as well as   Dermabond skin adhesives. The patient was then awakened and taken   to the postanesthesia care unit in stable condition. All needle and   instrument counts were correct at the completion of the case. I was   present and scrubbed throughout the entirety of the case. There were   no immediate complications. SPECIMENS REMOVED: None. COMPLICATIONS: None. IMPLANTS: 8-Turkmen Bard power port. DISPOSITION: PACU.         MD TRESSA Joseph / David   D:  08/02/2017   10:27   T:  08/02/2017   11:10   Job #:  [id number]

## 2017-08-02 NOTE — H&P
Date of Surgery Update:  Selvin Donohue Sr. was seen and examined. History and physical has been reviewed. The patient has been examined. There have been no significant clinical changes since the completion of the originally dated History and Physical.  He recently underwent esophageal stent placement and is eating a little better now with less pain. He plans for definitive chemotherapy/radiation rather than plans for future surgical resection of his GE junction tumor. Plan for port placement today for chemotherapy. A complete discussion of the risks, benefits and alternatives to surgery were discussed with the patient who was keen to proceed. Signed By: Tariq Wolff MD     August 2, 2017 8:38 AM         Please note from the office and include the additional information below:    Past Medical History  Past Medical History:   Diagnosis Date    Acid reflux     Arthritis     right  left knees    Back pain, chronic 4/11/2014    Cancer ([facility] [address] 2017    esophegeal    Decreased hearing of both ears 4/5/2017    DNR (do not resuscitate) 4/5/2017    Gastroesophageal reflux disease without esophagitis 6/13/2017    GERD (gastroesophageal reflux disease)     Hip pain, left 4/11/2014    Hypercholesterolemia     Hypertension     Need for varicella vaccine 4/11/2014    Rash of entire body 9/15/2016    Restless leg syndrome         Past Surgical History  Past Surgical History:   Procedure Laterality Date    ABDOMEN SURGERY PROC UNLISTED  1980's    gall bladder    ABDOMEN SURGERY PROC UNLISTED  15 yrs ago    right hernia repair    HX HEENT      EYE SURGERY 40 YRS AGO.  HX ORTHOPAEDIC      fluid removed both knees    HX ORTHOPAEDIC  2010    bilateral knee replacements        Social History  The patient Selvin Donohue Sr.  reports that he quit smoking about 47 years ago. He quit after 0.00 years of use. He has never used smokeless tobacco. He reports that he drinks alcohol.  He reports that he does not use illicit drugs.      Family History  Family History   Problem Relation Age of Onset    Heart Disease Sister           Shyam Barker MD

## 2017-08-02 NOTE — ANESTHESIA PREPROCEDURE EVALUATION
Anesthetic History   No history of anesthetic complications            Review of Systems / Medical History  Patient summary reviewed, nursing notes reviewed and pertinent labs reviewed    Pulmonary  Within defined limits                 Neuro/Psych   Within defined limits           Cardiovascular    Hypertension                   GI/Hepatic/Renal     GERD           Endo/Other        Arthritis and cancer     Other Findings            Physical Exam    Airway  Mallampati: II  TM Distance: > 6 cm  Neck ROM: normal range of motion   Mouth opening: Normal     Cardiovascular  Regular rate and rhythm,  S1 and S2 normal,  no murmur, click, rub, or gallop             Dental    Dentition: Upper dentition intact and Lower dentition intact     Pulmonary  Breath sounds clear to auscultation               Abdominal  GI exam deferred       Other Findings            Anesthetic Plan    ASA: 3  Anesthesia type: MAC            Anesthetic plan and risks discussed with: Patient

## 2017-08-02 NOTE — IP AVS SNAPSHOT
[address] [facility] [address] [place] [address] 
[phone] Patient: Vignesh Sinclair Sr. MRN: [id number] GNF:4/92/5862 You are allergic to the following No active allergies Recent Documentation Height Weight BMI Smoking Status 1.829 m 83 kg 24.82 kg/m2 Former Smoker Emergency Contacts Name Discharge Info Relation Home Work Mobile [facility] DISCHARGE CAREGIVER [3] Daughter [21] [phone] [facility]  Child [2] [phone] About your hospitalization You were admitted on:  August 2, 2017 You last received care in the:  [facility] PACU You were discharged on:  August 2, 2017 Unit phone number:  [phone] Why you were hospitalized Your primary diagnosis was:  Not on File Providers Seen During Your Hospitalizations Provider Role Specialty Primary office phone Tyson Bolaños MD Attending Provider General Surgery [phone] Your Primary Care Physician (PCP) Primary Care Physician Office Phone Office Fax Lionel Ambrose [phone] Follow-up Information Follow up With Details Comments Contact Info Elías Rocha MD Go today Go to previously scheduled appointment to initiate chemotherapy [address] [facility] [address] [facility] [address] 
[phone] Your Appointments Thursday August 03, 2017  8:30 AM EDT  
(Arrive by 6:30 AM) RADIATION ONCOLOGY with RAD ONC THERAPY [facility] RAD THERAPY AT [facility]) [address] [place] [address]  
[phone] Tuesday August 08, 2017  2:00 PM EDT Follow Up with Elías Rocha MD  
[address] [place] [address] Oncology at [address]) 217 [facility] [address] [facility] [address]  
[phone] Wednesday August 09, 2017 11:00 AM EDT INFUSION 360 with RAGHAVENDRA 102B - CHAIR JEVON [facility] ([address]) [facility] [address] [address]  
[phone] Go to [address], ground floor. Wednesday August 16, 2017  9:00 AM EDT INFUSION 360 with RM 102A- CHAIR [facility] ([address]) [address] [facility] [address] [address]  
[phone] Go to [address], ground floor. Wednesday August 23, 2017 11:00 AM EDT INFUSION 360 with RM 102B - CHAIR [facility] ([address]) [facility] [address] [phone]  
[phone] Go to [address], ground floor. Wednesday August 30, 2017 11:00 AM EDT INFUSION 360 with RM 102B - CHAIR [facility] ([address]) [facility] [address] [address]  
[phone] Go to [address], ground floor. Wednesday September 06, 2017 11:00 AM EDT INFUSION 360 with RM 102B - CHAIR [facility] ([address]) [facility] [address] [address]  
[phone] Go to [address], ground floor. Wednesday September 13, 2017 11:00 AM EDT ROUTINE CARE with Claudis Phalen, MD  
[facility] OFFICE-ANNEX ([facility]) [address] [facility] [address] [address] [phone] Current Discharge Medication List  
  
CONTINUE these medications which have NOT CHANGED Dose & Instructions Dispensing Information Comments Morning Noon Evening Bedtime  
 calcium-cholecalciferol (D3) tablet Commonly known as:  CALTRATE 600+D Your last dose was: Your next dose is:    
   
   
 Dose:  1 Tab Take 1 Tab by mouth two (2) times a day. Quantity:  60 Tab Refills:  11  
     
   
   
   
  
 chlorpheniramine 4 mg tablet Commonly known as:  ALLERGY RELIEF(CHLORPHENIRAMN) Your last dose was: Your next dose is: TAKE 1 TABLET BY MOUTH TWICE A DAY AS NEEDED FOR ALLERGIES OR RHINITIS Quantity:  60 Tab Refills:  1  
     
   
   
   
  
 finasteride 5 mg tablet Commonly known as:  PROSCAR Your last dose was: Your next dose is:    
   
   
 Dose:  5 mg Take 1 Tab by mouth daily. Indications: SYMPTOMATIC BENIGN PROSTATIC HYPERPLASIA Quantity:  30 Tab Refills:  6  
     
   
   
   
  
 fluticasone 50 mcg/actuation nasal spray Commonly known as:  [place] Your last dose was: Your next dose is:    
   
   
 2 puffs daily  Indications: ALLERGIC CONJUNCTIVITIS, ALLERGIC RHINITIS Quantity:  1 Bottle Refills:  6  
     
   
   
   
  
 gabapentin 800 mg tablet Commonly known as:  NEURONTIN Your last dose was: Your next dose is: TAKE 1 TABLET BY MOUTH 3 TIMES A DAY Quantity:  90 Tab Refills:  4  
     
   
   
   
  
 lisinopril 5 mg tablet Commonly known as:  Lisha Walters Your last dose was: Your next dose is: TAKE 1 TAB BY MOUTH DAILY. Quantity:  30 Tab Refills:  6 OTHER Your last dose was: Your next dose is:    
   
   
 Daughter states patient is taking polyglycol Refills:  0  
     
   
   
   
  
 pantoprazole 40 mg tablet Commonly known as:  PROTONIX Your last dose was: Your next dose is:    
   
   
 Dose:  40 mg Take 1 Tab by mouth daily. One tab 30min before meal twice daily  Indications: Duodenal Ulcer, gastroesophageal reflux disease, HEARTBURN, Upper GI Bleed Quantity:  60 Tab Refills:  0  
     
   
   
   
  
 raNITIdine 150 mg tablet Commonly known as:  ZANTAC Your last dose was: Your next dose is:    
   
   
 Dose:  150 mg Take 150 mg by mouth two (2) times a day. Refills:  0  
     
   
   
   
  
 sucralfate 100 mg/mL suspension Commonly known as:  Gaylan Lorri Your last dose was: Your next dose is:    
   
   
 Dose:  1 tsp Take 5 mL by mouth four (4) times daily. Indications: Duodenal Ulcer Quantity:  414 mL Refills:  2  
     
   
   
   
  
 tamsulosin 0.4 mg capsule Commonly known as:  FLOMAX Your last dose was: Your next dose is: TAKE 2 CAPS BY MOUTH DAILY. Quantity:  60 Cap Refills:  6  
     
   
   
   
  
 traMADol 50 mg tablet Commonly known as:  ULTRAM  
   
Your last dose was: Your next dose is:    
   
   
 Dose:  50 mg Take 1 Tab by mouth every six (6) hours as needed for Pain. Max Daily Amount: 200 mg. Indications: Pain Quantity:  120 Tab Refills:  0  
     
   
   
   
  
 VITAMIN B-12 1,000 mcg tablet Generic drug:  cyanocobalamin ER Your last dose was: Your next dose is: TAKE 1 TABLET BY MOUTH EVERY DAY Refills:  6 Discharge Instructions Patient Discharge Instructions Richi Sargent / [id number] : 1931 Admitted 2017 Discharged: 2017 · It is important that you take the medication exactly as they are prescribed. · Keep your medication in the bottles provided by the pharmacist and keep a list of the medication names, dosages, and times to be taken in your wallet. · Do not take other medications without consulting your doctor. What to do at [facility] Recommended diet: Resume previous diet as tolerated Recommended activity: No Heavy Lifting with right arm for 2-3 days. No Driving While Taking narcotic pain medications. May Take Shower 1 day after surgery. Do not soak or submerge your incision under water for 7 days. If you experience any of the following symptoms Fevers, Chills, Nausea, Vomitting, Redness or Drainage at Surgical Site(s) or Any Other Questions or Concerns Please Call -  [phone]. Follow-up with Dr. Karen Paredes as needed. You can follow with Dr. Moustapha Ivey for chemotherapy as planned. Information obtained by : 
I understand that if any problems occur once I am at home I am to contact my physician. I understand and acknowledge receipt of the instructions indicated above. Physician's or R.N.'s Signature                                                                  Date/Time Patient or Representative Signature                                                          Date/Time 
 
 
______________________________________________________________________ Anesthesia Discharge Instructions After general anesthesia or intervenous sedation, for 24 hours or while taking prescription Narcotics: · Limit your activities · Do not drive or operate hazardous machinery · If you have not urinated within 8 hours after discharge, please contact your surgeon on call. · Do not make important personal or business decisions · Do not drink alcoholic beverages Report the following to your surgeon: 
· Excessive pain, swelling, redness or odor of or around the surgical area · Temperature over 100.5 degrees · Nausea and vomiting lasting longer than 4 hours or if unable to take medication · Any signs of decreased circulation or nerve impairment to extremity:  Change in color, persistent numbness, tingling, coldness or increased pain. · Any questions Discharge Orders None Introducing [facility]! Irene robbins [employer] patient portal. Now you can access parts of your medical record, email your doctor's office, and request medication refills online. 1. In your internet browser, go to [email or web address] 2. Click on the First Time User? Click Here link in the Sign In box. You will see the New Member Sign Up page. 3. Enter your [employer] Access Code exactly as it appears below. You will not need to use this code after youve completed the sign-up process. If you do not sign up before the expiration date, you must request a new code. · [employer] Access Code: [email or web address] Expires: 10/21/2017  2:34 PM 
 
4. Enter the last four digits of your Social Security Number (xxxx) and Date of Birth (mm/dd/yyyy) as indicated and click Submit. You will be taken to the next sign-up page. 5. Create a [employer] ID. This will be your [employer] login ID and cannot be changed, so think of one that is secure and easy to remember. 6. Create a [employer] password. You can change your password at any time. 7. Enter your Password Reset Question and Answer. This can be used at a later time if you forget your password. 8. Enter your e-mail address. You will receive e-mail notification when new information is available in [address] 9. Click Sign Up. You can now view and download portions of your medical record. 10. Click the Download Summary menu link to download a portable copy of your medical information. If you have questions, please visit the Frequently Asked Questions section of the [employer] website. Remember, [employer] is NOT to be used for urgent needs. For medical emergencies, dial 911. Now available from your iPhone and Android! General Information Please provide this summary of care documentation to your next provider. Patient Signature:  ____________________________________________________________ Date:  ____________________________________________________________  
  
Arlette Eddie Provider Signature:  ____________________________________________________________ Date:  ____________________________________________________________

## 2017-08-02 NOTE — ANESTHESIA POSTPROCEDURE EVALUATION
Post-Anesthesia Evaluation and Assessment    Patient: Chicho Nelson Sr. MRN: [id number]  SSN: [id number]    YOB: 1931  Age: 80 y.o. Sex: male       Cardiovascular Function/Vital Signs  Visit Vitals    /85    Pulse 68    Temp 36.6 °C (97.8 °F)    Resp 14    Ht 6' (1.829 m)    Wt 83 kg (183 lb)    SpO2 100%    BMI 24.82 kg/m2       Patient is status post MAC anesthesia for Procedure(s):  Infusaport  Insertion with Fluro and Ultrasound. Nausea/Vomiting: None    Postoperative hydration reviewed and adequate. Pain:  Pain Scale 1: Numeric (0 - 10) (08/02/17 1057)  Pain Intensity 1: 0 (08/02/17 1057)   Managed    Neurological Status:   Neuro (WDL): Within Defined Limits (08/02/17 1057)   At baseline    Mental Status and Level of Consciousness: Arousable    Pulmonary Status:   O2 Device: Room air (08/02/17 1057)   Adequate oxygenation and airway patent    Complications related to anesthesia: None    Post-anesthesia assessment completed.  No concerns    Signed By: Violet Davey MD     August 2, 2017

## 2017-08-02 NOTE — DISCHARGE INSTRUCTIONS
Patient Discharge Instructions    Ashu High. / [id number] : 1931    Admitted 2017 Discharged: 2017       · It is important that you take the medication exactly as they are prescribed. · Keep your medication in the bottles provided by the pharmacist and keep a list of the medication names, dosages, and times to be taken in your wallet. · Do not take other medications without consulting your doctor. What to do at Home    Recommended diet: Resume previous diet as tolerated    Recommended activity: No Heavy Lifting with right arm for 2-3 days. No Driving While Taking narcotic pain medications. May Take Shower 1 day after surgery. Do not soak or submerge your incision under water for 7 days. If you experience any of the following symptoms Fevers, Chills, Nausea, Vomitting, Redness or Drainage at Surgical Site(s) or Any Other Questions or Concerns Please Call -  [phone]. Follow-up with Dr. Adam Molina as needed. You can follow with Dr. Zac Weaver for chemotherapy as planned. Information obtained by :  I understand that if any problems occur once I am at home I am to contact my physician. I understand and acknowledge receipt of the instructions indicated above.                                                                                                                                            Physician's or R.N.'s Signature                                                                  Date/Time                                                                                                                                              Patient or Representative Signature                                                          Date/Time      ______________________________________________________________________    Anesthesia Discharge Instructions    After general anesthesia or intervenous sedation, for 24 hours or while taking prescription Narcotics:  · Limit your activities  · Do not drive or operate hazardous machinery  · If you have not urinated within 8 hours after discharge, please contact your surgeon on call. · Do not make important personal or business decisions  · Do not drink alcoholic beverages    Report the following to your surgeon:  · Excessive pain, swelling, redness or odor of or around the surgical area  · Temperature over 100.5 degrees  · Nausea and vomiting lasting longer than 4 hours or if unable to take medication  · Any signs of decreased circulation or nerve impairment to extremity:  Change in color, persistent numbness, tingling, coldness or increased pain.   · Any questions

## 2017-08-02 NOTE — PERIOP NOTES
Patient: Mary Christine Sr. MRN: [id number]  SSN: [id number]   YOB: 1931  Age: 80 y.o. Sex: male     Patient is status post Procedure(s):  Infusaport  Insertion with Fluro and Ultrasound. Surgeon(s) and Role:     * Deana Tovar MD - Primary    Local/Dose/Irrigation:  See [facility]                Venous Access Device 08/02/17 Other (comment) (Active)   Central Line Being Utilized No 8/2/2017  9:45 AM   Criteria for Appropriate Use Long term IV/antibiotic administration 8/2/2017  9:45 AM   Site Assessment Clean, dry, & intact 8/2/2017  9:45 AM   Dressing Status Clean, dry, & intact 8/2/2017  9:45 AM      Peripheral IV 08/02/17 Right Hand (Active)   Site Assessment Clean, dry, & intact 8/2/2017  8:28 AM   Phlebitis Assessment 0 8/2/2017  8:28 AM   Infiltration Assessment 0 8/2/2017  8:28 AM   Dressing Status Clean, dry, & intact 8/2/2017  8:28 AM   Dressing Type Transparent 8/2/2017  8:28 AM   Hub Color/Line Status Pink; Infusing 8/2/2017  8:28 AM   Alcohol Cap Used Yes 8/2/2017  8:28 AM                           Dressing/Packing:  Wound Neck Right-DRESSING TYPE: Topical skin adhesive/glue (Dermabond on the incision) (08/02/17 0900)  Splint/Cast:  ]    Other:

## 2017-08-05 PROBLEM — R47.02 DYSPHASIA: Status: ACTIVE | Noted: 2017-01-01
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
## 2017-08-05 PROBLEM — R41.82 ALTERED MENTAL STATE: Status: ACTIVE | Noted: 2017-01-01

## 2017-08-05 NOTE — ED PROVIDER NOTES
HPI Comments: 80 y.o. male with past medical history significant for HTN, esophageal CA who presents from home via EMS accompanied by daughter for evaluation s/p GLF. Daughter states she heard the patient fall while in the bathroom at her house earlier today. Daughter states she noticed that the patient's gait was wobbly while using his cane, which he does not typically need. Daughter states patient was able to get himself up after the fall but she noticed that he seemed confused and dizzy. Daughter states patient had a stent placed on 7/31/17 then had a port placed 8/2/17. Daughter states the stent has been moving. Daughter states patient has not been eating much due to pain and has been more tired than usual. Patient is oriented to time and president. There are no other acute medical concerns at this time. Social hx: former smoker, no alcohol  PCP: Surya Bradley MD  Oncologist: Lokesh Saha MD    Note written by Milan Little, as dictated by Hector Saleh MD 6:20 PM     The history is provided by the patient and a relative. No  was used. Past Medical History:   Diagnosis Date    Acid reflux     Arthritis     right  left knees    Back pain, chronic 4/11/2014    Cancer ([facility] [address] 2017    esophegeal    Decreased hearing of both ears 4/5/2017    DNR (do not resuscitate) 4/5/2017    Gastroesophageal reflux disease without esophagitis 6/13/2017    GERD (gastroesophageal reflux disease)     Hip pain, left 4/11/2014    Hypercholesterolemia     Hypertension     Need for varicella vaccine 4/11/2014    Rash of entire body 9/15/2016    Restless leg syndrome        Past Surgical History:   Procedure Laterality Date    ABDOMEN SURGERY PROC UNLISTED  1980's    gall bladder    ABDOMEN SURGERY PROC UNLISTED  15 yrs ago    right hernia repair    HX HEENT      EYE SURGERY 40 YRS AGO.     HX ORTHOPAEDIC      fluid removed both knees    HX ORTHOPAEDIC  2010    bilateral knee replacements         Family History:   Problem Relation Age of Onset    Heart Disease Sister        Social History     Social History    Marital status:      Spouse name: N/A    Number of children: N/A    Years of education: N/A     Occupational History    Not on file. Social History Main Topics    Smoking status: Former Smoker     Years: 0.00     Quit date: 4/28/1970    Smokeless tobacco: Never Used    Alcohol use Yes      Comment: OCCASSIONALLY    Drug use: No    Sexual activity: Not Currently     Other Topics Concern    Not on file     Social History Narrative         ALLERGIES: Review of patient's allergies indicates no known allergies. Review of Systems   Constitutional: Positive for appetite change and fatigue. Negative for chills, diaphoresis and fever. HENT: Negative for congestion, rhinorrhea, sinus pressure, sore throat, trouble swallowing and voice change. Eyes: Negative for photophobia and visual disturbance. Respiratory: Negative for cough, chest tightness and shortness of breath. Cardiovascular: Negative for chest pain, palpitations and leg swelling. Gastrointestinal: Negative for abdominal pain, blood in stool, constipation, diarrhea, nausea and vomiting. Musculoskeletal: Negative for arthralgias, myalgias and neck pain. Neurological: Positive for dizziness. Negative for weakness, light-headedness, numbness and headaches. Psychiatric/Behavioral: Positive for confusion. Vitals:    08/05/17 1704 08/05/17 1830 08/05/17 1930   BP: (!) 154/93 (!) 168/93 158/90   Pulse: 74     Resp: 18     Temp: 99 °F (37.2 °C)     SpO2: 96% 96% 95%   Weight: 84.7 kg (186 lb 12.8 oz)     Height: 6' (1.829 m)              Physical Exam   Constitutional: He is oriented to person, place, and time. He appears well-developed and well-nourished. No distress. HENT:   Head: Normocephalic and atraumatic.    Nose: Nose normal.   Mouth/Throat: Oropharynx is clear and moist. No oropharyngeal exudate. Eyes: Conjunctivae and EOM are normal. Right eye exhibits no discharge. Left eye exhibits no discharge. No scleral icterus. Neck: Normal range of motion. Neck supple. No JVD present. No tracheal deviation present. No thyromegaly present. Cardiovascular: Normal rate, regular rhythm, normal heart sounds and intact distal pulses. Exam reveals no gallop and no friction rub. No murmur heard. Pulmonary/Chest: Effort normal and breath sounds normal. No stridor. No respiratory distress. He has no wheezes. He has no rales. He exhibits no tenderness. Abdominal: Bowel sounds are normal. He exhibits no distension and no mass. There is no tenderness. There is no rebound. Musculoskeletal: Normal range of motion. He exhibits no edema or tenderness. Lymphadenopathy:     He has no cervical adenopathy. Neurological: He is alert and oriented to person, place, and time. No cranial nerve deficit. Coordination normal.   Some confusion   Skin: Skin is warm and dry. No rash noted. He is not diaphoretic. No erythema. No pallor. Psychiatric: He has a normal mood and affect. His behavior is normal. Judgment and thought content normal.   Note written by Milan De León, as dictated by Juju Harvey MD 8:07 PM      MDM  Number of Diagnoses or Management Options  Disorientation:   Diagnosis management comments: CVA, altered mental status, dehydration, metastatic disease. 81 y/o male presenting with altered mental status new onset patient was recently diagnosed with esophageal cancer and unable to tolerate p.o. likely dehydration however metastatic disease versus CVA also highly likely. Plan:  CT head, CBC, CMP, UA, drug screen, alcohol level. The patient will need admission for altered mental status further evaluation.        Amount and/or Complexity of Data Reviewed  Clinical lab tests: ordered and reviewed  Tests in the radiology section of CPT®: ordered and reviewed  Obtain history from someone other than the patient: yes  Review and summarize past medical records: yes  Discuss the patient with other providers: yes    Risk of Complications, Morbidity, and/or Mortality  Presenting problems: moderate  Diagnostic procedures: moderate  Management options: moderate    Patient Progress  Patient progress: stable    ED Course       Procedures    CONSULT NOTE:  7:18 PM Stanton Escalona MD spoke with Dr. Nessa Baker, Consult for Hospitalist.  Discussed available diagnostic tests and clinical findings. She is in agreement with care plans as outlined. Dr. Nessa Baker will admit for altered mental status and decreased PO intake. 8:40 AM  Patient is being admitted to the hospital.  The results of their tests and reasons for their admission have been discussed with them and/or available family. They convey agreement and understanding for the need to be admitted and for their admission diagnosis. Consultation has been made with the inpatient physician specialist for hospitalization.     LABORATORY TESTS:  Recent Results (from the past 12 hour(s))   METABOLIC PANEL, BASIC    Collection Time: 08/06/17  6:19 AM   Result Value Ref Range    Sodium 136 136 - 145 mmol/L    Potassium 5.4 (H) 3.5 - 5.1 mmol/L    Chloride 102 97 - 108 mmol/L    CO2 29 21 - 32 mmol/L    Anion gap 5 5 - 15 mmol/L    Glucose 92 65 - 100 mg/dL    BUN 18 6 - 20 MG/DL    Creatinine 1.31 (H) 0.70 - 1.30 MG/DL    BUN/Creatinine ratio 14 12 - 20      GFR est AA >60 >60 ml/min/1.73m2    GFR est non-AA 52 (L) >60 ml/min/1.73m2    Calcium 9.3 8.5 - 10.1 MG/DL   CBC WITH AUTOMATED DIFF    Collection Time: 08/06/17  6:19 AM   Result Value Ref Range    WBC 7.0 4.1 - 11.1 K/uL    RBC 3.59 (L) 4.10 - 5.70 M/uL    HGB 11.8 (L) 12.1 - 17.0 g/dL    HCT 33.2 (L) 36.6 - 50.3 %    MCV 92.5 80.0 - 99.0 FL    MCH 32.9 26.0 - 34.0 PG    MCHC 35.5 30.0 - 36.5 g/dL    RDW 12.2 11.5 - 14.5 %    PLATELET 128 849 - 151 K/uL    NEUTROPHILS 68 32 - 75 % LYMPHOCYTES 16 12 - 49 %    MONOCYTES 10 5 - 13 %    EOSINOPHILS 6 0 - 7 %    BASOPHILS 0 0 - 1 %    ABS. NEUTROPHILS 4.8 1.8 - 8.0 K/UL    ABS. LYMPHOCYTES 1.1 0.8 - 3.5 K/UL    ABS. MONOCYTES 0.7 0.0 - 1.0 K/UL    ABS. EOSINOPHILS 0.4 0.0 - 0.4 K/UL    ABS. BASOPHILS 0.0 0.0 - 0.1 K/UL       IMAGING RESULTS:  CT HEAD WO CONT   Final Result        Ct Head Wo Cont    Result Date: 8/5/2017  EXAM:  CT HEAD WO CONT INDICATION:   Fal COMPARISON: August 9, 2006. CONTRAST:  None. TECHNIQUE: Unenhanced CT of the head was performed using 5 mm images. Brain and bone windows were generated. CT dose reduction was achieved through use of a standardized protocol tailored for this examination and automatic exposure control for dose modulation. Adaptive statistical iterative reconstruction (ASIR) was utilized. FINDINGS: The ventricles and sulci are normal in size, shape and configuration and midline. Mild bilateral periventricular diminished attenuation is shown in the frontal regions as is a lacunar focus within the head of the right caudate nucleus, of chronic ischemic nature. There is no intracranial hemorrhage, extra-axial collection, mass, mass effect or midline shift. The basilar cisterns are open. No acute infarct is identified. The bone windows demonstrate no abnormalities. The visualized portions of the paranasal sinuses and mastoid air cells are clear. IMPRESSION: Chronic ischemic changes. No acute findings. MEDICATIONS GIVEN:  Medications   sodium chloride (NS) flush 5-10 mL (10 mL IntraVENous Given 8/6/17 4443)   sodium chloride (NS) flush 5-10 mL (not administered)   0.9% sodium chloride infusion (75 mL/hr IntraVENous New Bag 8/5/17 3153)       IMPRESSION:  1. Disorientation        PLAN:  1.  Admit to Jordi Mancia MD

## 2017-08-05 NOTE — IP AVS SNAPSHOT
[address] 
[phone] Patient: Nya Harper Sr. MRN: [id number] Current Discharge Medication List  
  
ASK your doctor about these medications Dose & Instructions Dispensing Information Comments Morning Noon Evening Bedtime  
 aspirin delayed-release 81 mg tablet Your last dose was: Your next dose is:    
   
   
 Dose:  81 mg Take 81 mg by mouth daily. Refills:  0  
     
   
   
   
  
 CALCIUM 600 + D 600-125 mg-unit Tab Generic drug:  calcium-cholecalciferol (d3) Your last dose was: Your next dose is: Take  by mouth daily. Refills:  0  
     
   
   
   
  
 CARAFATE 100 mg/mL suspension Generic drug:  sucralfate Your last dose was: Your next dose is: Take  by mouth four (4) times daily. Refills:  0 CLARITIN 10 mg tablet Generic drug:  loratadine Your last dose was: Your next dose is:    
   
   
 Dose:  10 mg Take 10 mg by mouth daily. Refills:  0  
     
   
   
   
  
 lisinopril 10 mg tablet Commonly known as:  Charlette Beltran Your last dose was: Your next dose is:    
   
   
 Dose:  5 mg Take 5 mg by mouth daily. Refills:  0 MIRALAX 17 gram packet Generic drug:  polyethylene glycol Your last dose was: Your next dose is:    
   
   
 Dose:  17 g Take 17 g by mouth daily. Refills:  0 NEURONTIN 800 mg tablet Generic drug:  gabapentin Your last dose was: Your next dose is: Take  by mouth three (3) times daily. Refills:  0 PROSCAR 5 mg tablet Generic drug:  finasteride Your last dose was: Your next dose is:    
   
   
 Dose:  5 mg Take 5 mg by mouth daily. Refills:  0 PROTONIX 40 mg tablet Generic drug:  pantoprazole Your last dose was: Your next dose is:    
   
   
 Dose:  40 mg Take 40 mg by mouth two (2) times a day. Gastric cancer, hx bleeding Refills:  0  
     
   
   
   
  
 raNITIdine 150 mg tablet Commonly known as:  ZANTAC Your last dose was: Your next dose is:    
   
   
 Dose:  150 mg Take 150 mg by mouth two (2) times a day. Refills:  0  
     
   
   
   
  
 tamsulosin 0.4 mg capsule Commonly known as:  FLOMAX Your last dose was: Your next dose is:    
   
   
 Dose:  0.8 mg Take 0.8 mg by mouth daily. Refills:  0  
     
   
   
   
  
 traMADol 50 mg tablet Commonly known as:  ULTRAM  
   
Your last dose was: Your next dose is:    
   
   
 Dose:  50 mg Take 50 mg by mouth every eight (8) hours as needed for Pain. Refills:  0  
     
   
   
   
  
 VITAMIN B-12 1,000 mcg tablet Generic drug:  cyanocobalamin Your last dose was: Your next dose is:    
   
   
 Dose:  1000 mcg Take 1,000 mcg by mouth daily. Refills:  0

## 2017-08-05 NOTE — ED TRIAGE NOTES
Triage: GLF unwitnessed, no LOC. Recently diagnosed with esophageal cancer and had a stent placed not yet started chemo. Recent decline since the stent placement decreased oral intake, generalized weakness and some confusion that his daughter noticed today that is not his baseline.

## 2017-08-05 NOTE — IP AVS SNAPSHOT
[facility] 
[phone] Patient: Lien Chavez Sr. MRN: [id number] APT:1/80/9078 You are allergic to the following No active allergies Recent Documentation Height Weight BMI Smoking Status 1.829 m 80.7 kg 24.13 kg/m2 Former Smoker Emergency Contacts  (Rel.) Home Phone Work Phone Mobile Phone Pato Asher (Daughter) [phone] -- [phone] Pato Asher (Child) [phone] -- -- About your hospitalization You were admitted on:  August 5, 2017 [facility] received [facility] in the:  [facility] You were discharged on:  August 11, 2017 Why you were hospitalized Your primary diagnosis was:  Dysphasia Your diagnoses also included: Altered Mental State, Fall Providers Seen During Your Hospitalizations Provider Role Specialty Primary office phone Vero Bledsoe MD Attending Provider Emergency Medicine [phone] Ran Walker MD Attending Provider [facility] [phone] Pb Quan MD Attending Provider Internal Medicine [phone] Migel Roper MD Attending Provider Internal Medicine [phone] Keshawn Kelly MD Attending Provider Hospitalist [phone] Your Primary Care Physician (PCP) Primary Care Physician Office Phone Office Fax Moni Goodwin [phone] Follow-up Information Follow up With Details Comments Contact Info [facility] On 8/11/2017 new PEG tube supplies [facility] [phone] Matias Gary MD  As needed [address] [address] 
[phone] Antolin Singh MD  next week for chemo as previously scheduled [address] [facility] 
[phone] Juan A Leavitt MD  As needed [address] [facility] James [address] 13 
[phone] Appointments for Next 14 days 8/16/2017  9:00 AM  INFUSION [address]  [facility] [address]  
 8/16/2017  9:15 AM  FOLLOW UP [facility] at [facility] 8/23/2017 11:00 AM  INFUSION 360  [facility]  
 8/23/2017 11:30 AM  FOLLOW  [place] [facility] Current Discharge Medication List  
  
START taking these medications Dose & Instructions Dispensing Information Comments Morning Noon Evening Bedtime  
 amoxicillin-clavulanate 875-125 mg per tablet Commonly known as:  AUGMENTIN Your last dose was: Your next dose is:    
   
   
 Dose:  1 Tab Take 1 Tab by mouth every twelve (12) hours for 5 days. Quantity:  10 Tab Refills:  0  
     
   
   
   
  
 L. acidoph & paracasei- S therm- Bifido 8 billion cell Cap cap Commonly known as:  ROMI-Q/RISAQUAD Your last dose was: Your next dose is:    
   
   
 Dose:  1 Cap Take 1 Cap by mouth daily. Quantity:  10 Cap Refills:  0 CONTINUE these medications which have CHANGED Dose & Instructions Dispensing Information Comments Morning Noon Evening Bedtime  
 pantoprazole 40 mg tablet Commonly known as:  PROTONIX What changed:  when to take this Your last dose was: Your next dose is:    
   
   
 Dose:  40 mg Take 1 Tab by mouth daily. Gastric cancer, hx bleeding Quantity:  30 Tab Refills:  0  
     
   
   
   
  
 raNITIdine 150 mg tablet Commonly known as:  ZANTAC What changed:   
- when to take this 
- reasons to take this Your last dose was: Your next dose is:    
   
   
 Dose:  150 mg Take 1 Tab by mouth two (2) times daily as needed for Indigestion. Quantity:  20 Tab Refills:  0  
     
   
   
   
  
 sucralfate 100 mg/mL suspension Commonly known as:  Kenneth Martino What changed:   
- how much to take - when to take this 
- reasons to take this Your last dose was: Your next dose is:    
   
   
 Dose:  1 g Take 10 mL by mouth four (4) times daily as needed. Quantity:  500 mL Refills:  0 CONTINUE these medications which have NOT CHANGED Dose & Instructions Dispensing Information Comments Morning Noon Evening Bedtime  
 aspirin delayed-release 81 mg tablet Your last dose was: Your next dose is:    
   
   
 Dose:  81 mg Take 81 mg by mouth daily. Refills:  0  
     
   
   
   
  
 CALCIUM 600 + D 600-125 mg-unit Tab Generic drug:  calcium-cholecalciferol (d3) Your last dose was: Your next dose is: Take  by mouth daily. Refills:  0 CLARITIN 10 mg tablet Generic drug:  loratadine Your last dose was: Your next dose is:    
   
   
 Dose:  10 mg Take 10 mg by mouth daily. Refills:  0  
     
   
   
   
  
 lisinopril 10 mg tablet Commonly known as:  Rose Escort Your last dose was: Your next dose is:    
   
   
 Dose:  5 mg Take 5 mg by mouth daily. Refills:  0 NEURONTIN 800 mg tablet Generic drug:  gabapentin Your last dose was: Your next dose is: Take  by mouth three (3) times daily. Refills:  0 PROSCAR 5 mg tablet Generic drug:  finasteride Your last dose was: Your next dose is:    
   
   
 Dose:  5 mg Take 5 mg by mouth daily. Refills:  0  
     
   
   
   
  
 tamsulosin 0.4 mg capsule Commonly known as:  FLOMAX Your last dose was: Your next dose is:    
   
   
 Dose:  0.8 mg Take 0.8 mg by mouth daily. Refills:  0  
     
   
   
   
  
 traMADol 50 mg tablet Commonly known as:  ULTRAM  
   
Your last dose was: Your next dose is:    
   
   
 Dose:  50 mg Take 50 mg by mouth every eight (8) hours as needed for Pain. Refills:  0 VITAMIN B-12 1,000 mcg tablet Generic drug:  cyanocobalamin Your last dose was: Your next dose is:    
   
   
 Dose:  1000 mcg Take 1,000 mcg by mouth daily. Refills:  0 STOP taking these medications MIRALAX 17 gram packet Generic drug:  polyethylene glycol Where to Get Your Medications These medications were sent to [facility]/pharmacy #[id number]- [address], [address], [place] [address] Hours:  24-hours Phone:  [phone]  
  pantoprazole 40 mg tablet  
 raNITIdine 150 mg tablet Information on where to get these meds will be given to you by the nurse or doctor. ! Ask your nurse or doctor about these medications  
  amoxicillin-clavulanate 875-125 mg per tablet L. acidoph & paracasei- S therm- Bifido 8 billion cell Cap cap  
 sucralfate 100 mg/mL suspension Discharge Instructions Discharge Instructions PATIENT ID: Leon Randolph Sr. MRN: [id number] YOB: 1931 DATE OF ADMISSION: 8/5/2017  4:55 PM   
DATE OF DISCHARGE: 8/11/2017 PRIMARY CARE PROVIDER: Jonatan Brush MD  
ATTENDING PHYSICIAN: Ranjit Lopez MD 
DISCHARGING PROVIDER: Neel Walsh NP. To contact this individual call [phone] and ask the  to page. If unavailable ask to be transferred the Adult Hospitalist Department. DISCHARGE DIAGNOSES Suspected Aspiration during EGD/PEG 8/8 Odynophagia, likely related to Esophageal cancer CONSULTATIONS: IP CONSULT TO HOSPITALIST 
IP CONSULT TO GASTROENTEROLOGY PROCEDURES/SURGERIES: Procedure(s): ESOPHAGOGASTRODUODENOSCOPY (EGD) PERCUTANEOUS ENDOSCOPIC GASTROSTOMY TUBE INSERTION 
ENDOSCOPY WITH PROSTHESIS OR STENT REMOVAL 
 
FOLLOW UP APPOINTMENTS:  
Follow-up Information Follow up With Details Comments Contact Info [facility] On 8/10/2017 new PEG tube supplies [facility] [phone] Devin Alicea MD  As needed [address] [address] 
[phone] Claudia Henderson MD  next week for chemo as previously scheduled [address] [facility] [address] [place] [address] 
[phone] Lena Clifton MD  As needed [address] [facility] [address] 1400 [address] [place] [address] 
[phone] ADDITIONAL CARE RECOMMENDATIONS:  
- hold off on any laxatives or stool softeners - PPI has been adjusted to once daily DIET: Mechanical Soft Two Rubens HN, 240 mL 5x/day (0600, 0900, 1200, 1500, 1800) + 100 mL flush before and after each feeding ACTIVITY: Activity as tolerated EQUIPMENT needed: as per [facility] for tubefeeding · It is important that you take the medication exactly as they are prescribed. · Keep your medication in the bottles provided by the pharmacist and keep a list of the medication names, dosages, and times to be taken in your wallet. · Do not take other medications without consulting your doctor. NOTIFY YOUR PHYSICIAN FOR ANY OF THE FOLLOWING:  
Fever over 101 degrees for 24 hours. Chest pain, shortness of breath, fever, chills, nausea, vomiting, diarrhea, change in mentation, falling, weakness, bleeding. Severe pain or pain not relieved by medications. Or, any other signs or symptoms that you may have questions about. DISPOSITION: 
 xx Home With: 
 OT  PT xx HH xx RN  
  
 SNF/Inpatient Rehab/LTAC Independent/assisted living Hospice Other: CDMP Checked:  
Yes *x* PROBLEM LIST Updated: 
Yes *x* Signed:  
Jessica Teran NP 
8/11/2017 
4:40 PM 
 
 
 
 
Discharge Orders None Introducing hospitals & HEALTH SERVICES! Yina Sherwood introduces [employer] patient portal. Now you can access parts of your medical record, email your doctor's office, and request medication refills online. 1. In your internet browser, go to [email or web address] 2. Click on the First Time User? Click Here link in the Sign In box. You will see the New Member Sign Up page. 3. Enter your [employer] Access Code exactly as it appears below. You will not need to use this code after youve completed the sign-up process. If you do not sign up before the expiration date, you must request a new code. · [employer] Access Code: [email or web address] Expires: 10/21/2017  2:34 PM 
 
4. Enter the last four digits of your Social Security Number (xxxx) and Date of Birth (mm/dd/yyyy) as indicated and click Submit. You will be taken to the next sign-up page. 5. Create a Siasto ID. This will be your Siasto login ID and cannot be changed, so think of one that is secure and easy to remember. 6. Create a Siasto password. You can change your password at any time. 7. Enter your Password Reset Question and Answer. This can be used at a later time if you forget your password. 8. Enter your e-mail address. You will receive e-mail notification when new information is available in [address] 9. Click Sign Up. You can now view and download portions of your medical record. 10. Click the Download Summary menu link to download a portable copy of your medical information. If you have questions, please visit the Frequently Asked Questions section of the Si[employer] website. Remember, Siasto is NOT to be used for urgent needs. For medical emergencies, dial 911. Now available from your iPhone and Android! General Information Please provide this summary of care documentation to your next provider. Patient Signature:  ____________________________________________________________ Date:  ____________________________________________________________  
  
[facility] Provider Signature:  ____________________________________________________________ Date:  ____________________________________________________________

## 2017-08-05 NOTE — IP AVS SNAPSHOT
[address] 
[phone] Patient: Melvin Rivera Sr. MRN: [id number] GTL:4/69/3572 You are allergic to the following No active allergies Recent Documentation Height Weight BMI Smoking Status 1.829 m 82.4 kg 24.63 kg/m2 Former Smoker Emergency Contacts Name Discharge Info Relation Home Work Mobile [facility] DISCHARGE CAREGIVER [3] Daughter [21] [phone] [facility]  Child [2] [phone] About your hospitalization You were admitted on:  August 5, 2017 You last received care in the:  [facility] ENDOSCOPY You were discharged on:  August 8, 2017 Unit phone number:  [phone] Why you were hospitalized Your primary diagnosis was:  Dysphasia Your diagnoses also included: Altered Mental State, Fall Providers Seen During Your Hospitalizations Provider Role Specialty Primary office phone Yuval Carter MD Attending Provider Emergency Medicine [phone] Chey Fowler MD Attending Provider [facility] [phone] Myra Treviño MD Attending Provider Internal Medicine [phone] Jefferson Flores MD Attending Provider Internal Medicine [phone] Moris Raymond MD Attending Provider Hospitalist [phone] Your Primary Care Physician (PCP) Primary Care Physician Office Phone Office Fax Jose M Jacobsen [phone] Follow-up Information None Your Appointments Wednesday August 09, 2017 11:00 AM EDT Follow Up with Brigid Baez NP [address] [place] [address] Oncology at [facility] [address]  
[phone] Wednesday August 16, 2017  9:00 AM EDT INFUSION 360 with RM 102A- CHAIR [address] ([address])  [address] [facility] 
 [address] [phone]  
[phone] Go to [address], ground floor. Wednesday August 16, 2017  9:15 AM EDT Follow Up with Huseyin Galicia NP [facility] Oncology at [facility]) [address] [facility] 209 Timothy [address] 13  
[phone] Wednesday August 23, 2017 11:00 AM EDT INFUSION 360 with RM 102B - CHAIR [facility] - [address] ([address]) [address] [facility] [address] [phone]  
[phone] Go to [address], ground floor. Wednesday August 23, 2017 11:30 AM EDT Follow Up with Huseyin Galicia NP [facility] Oncology at [address]  
[phone] Wednesday August 30, 2017 11:00 AM EDT INFUSION 360 with RM 102B - CHAIR [facility] - [address] ([address]) [address] [phone] Go to [address], ground floor. Wednesday August 30, 2017 11:30 AM EDT Follow Up with Sindhu Green MD  
[facility] Oncology at [address] [facility] [address] [place] [address]) [address] [place] [address] [address] 13  
[phone] Wednesday September 06, 2017 11:00 AM EDT INFUSION 360 with RM 102B - CHAIR [address] ([address]) [address] [phone] [phone] Go to [address], ground floor. Wednesday September 13, 2017 11:00 AM EDT ROUTINE CARE with Vitaliy Stockton MD  
[facility] OFFICE-[facility] ([address]) [address] [phone] Current Discharge Medication List  
  
 ASK your doctor about these medications Dose & Instructions Dispensing Information Comments Morning Noon Evening Bedtime  
 aspirin delayed-release 81 mg tablet Your last dose was: Your next dose is:    
   
   
 Dose:  81 mg Take 81 mg by mouth daily. Refills:  0  
     
   
   
   
  
 CALCIUM 600 + D 600-125 mg-unit Tab Generic drug:  calcium-cholecalciferol (d3) Your last dose was: Your next dose is: Take  by mouth daily. Refills:  0  
     
   
   
   
  
 CARAFATE 100 mg/mL suspension Generic drug:  sucralfate Your last dose was: Your next dose is: Take  by mouth four (4) times daily. Refills:  0 CLARITIN 10 mg tablet Generic drug:  loratadine Your last dose was: Your next dose is:    
   
   
 Dose:  10 mg Take 10 mg by mouth daily. Refills:  0  
     
   
   
   
  
 lisinopril 10 mg tablet Commonly known as:  Nohemi Shames Your last dose was: Your next dose is:    
   
   
 Dose:  5 mg Take 5 mg by mouth daily. Refills:  0 MIRALAX 17 gram packet Generic drug:  polyethylene glycol Your last dose was: Your next dose is:    
   
   
 Dose:  17 g Take 17 g by mouth daily. Refills:  0 NEURONTIN 800 mg tablet Generic drug:  gabapentin Your last dose was: Your next dose is: Take  by mouth three (3) times daily. Refills:  0 PROSCAR 5 mg tablet Generic drug:  finasteride Your last dose was: Your next dose is:    
   
   
 Dose:  5 mg Take 5 mg by mouth daily. Refills:  0 PROTONIX 40 mg tablet Generic drug:  pantoprazole Your last dose was: Your next dose is:    
   
   
 Dose:  40 mg Take 40 mg by mouth two (2) times a day. Gastric cancer, hx bleeding Refills:  0 raNITIdine 150 mg tablet Commonly known as:  ZANTAC Your last dose was: Your next dose is:    
   
   
 Dose:  150 mg Take 150 mg by mouth two (2) times a day. Refills:  0  
     
   
   
   
  
 tamsulosin 0.4 mg capsule Commonly known as:  FLOMAX Your last dose was: Your next dose is:    
   
   
 Dose:  0.8 mg Take 0.8 mg by mouth daily. Refills:  0  
     
   
   
   
  
 traMADol 50 mg tablet Commonly known as:  ULTRAM  
   
Your last dose was: Your next dose is:    
   
   
 Dose:  50 mg Take 50 mg by mouth every eight (8) hours as needed for Pain. Refills:  0  
     
   
   
   
  
 VITAMIN B-12 1,000 mcg tablet Generic drug:  cyanocobalamin Your last dose was: Your next dose is:    
   
   
 Dose:  1000 mcg Take 1,000 mcg by mouth daily. Refills:  0 Discharge Instructions None Discharge Orders None Introducing [facility] & HEALTH SERVICES! Yina Sherwood introduces [employer] patient portal. Now you can access parts of your medical record, email your doctor's office, and request medication refills online. 1. In your internet browser, go to [email or web address] 2. Click on the First Time User? Click Here link in the Sign In box. You will see the New Member Sign Up page. 3. Enter your [employer] Access Code exactly as it appears below. You will not need to use this code after youve completed the sign-up process. If you do not sign up before the expiration date, you must request a new code. · [employer] Access Code: [email or web address] Expires: 10/21/2017  2:34 PM 
 
4. Enter the last four digits of your Social Security Number (xxxx) and Date of Birth (mm/dd/yyyy) as indicated and click Submit. You will be taken to the next sign-up page. 5. Create a [employer]acy ID.  This will be your [employer] login ID and cannot be changed, so think of one that is secure and easy to remember. 6. Create a [employer] password. You can change your password at any time. 7. Enter your Password Reset Question and Answer. This can be used at a later time if you forget your password. 8. Enter your e-mail address. You will receive e-mail notification when new information is available in [address] 9. Click Sign Up. You can now view and download portions of your medical record. 10. Click the Download Summary menu link to download a portable copy of your medical information. If you have questions, please visit the Frequently Asked Questions section of the [employer] website. Remember, [employer] is NOT to be used for urgent needs. For medical emergencies, dial 911. Now available from your iPhone and Android! General Information Please provide this summary of care documentation to your next provider. Patient Signature:  ____________________________________________________________ Date:  ____________________________________________________________  
  
Daniel Price Provider Signature:  ____________________________________________________________ Date:  ____________________________________________________________

## 2017-08-06 NOTE — PROGRESS NOTES
Hospitalist Progress Note  Idalia Dang MD  Office: [phone]  Cell: [phone]      Date of Service:  2017  NAME:  Mika Feliz Sr.  :  1931  MRN:  [id number]      Admission Summary:   81 yo male with Recent Dx of Esophageal Ca, HTN, admitted for unable to swallow solids and pain on eating solids. Pt also had a fall at home without head trauma. Pt unclear if was feeling lightheaded at the time of fall. Pt recent had a esophageal stent placement followed by Chemo port placement. During Chemo port placement it was thought that esophageal stent had migrated. Interval history / Subjective:     Pt seen and examined  Not in distress  Drinking water without difficulty  Alert and oriented X 3  Denies any pain on questioning. Assessment & Plan:     1. Odynophagia  Likely related to Esophageal cancer  - per report appears to have stent migration  - GI Consulted  - possible plan for PEG  - clears for now as tolerated    2. CKD 3  - stable    3. Mild Hyperkalemia  - monitor     4. S/p Fall  - stable with PT: do not need PT at discharge    5. HTN   - c/w LIsinopril    6. BPH  - c/w Proscar and Flomax    7. Cont other meds from home      Code status: DNR  DVT prophylaxis: scds    Care Plan discussed with: Patient/Family  Disposition: Home w/Family     Hospital Problems  Date Reviewed: 2017          Codes Class Noted POA    Altered mental state ICD-10-CM: R41.82  ICD-9-CM: 780.97  2017 Unknown        Dysphasia ICD-10-CM: R47.02  ICD-9-CM: 784.59  2017 Unknown        Fall ICD-10-CM: L21. Kenard Plan  ICD-9-CM: S326.9  2017 Unknown                Review of Systems:   A comprehensive review of systems was negative except for that written in the HPI. Vital Signs:    Last 24hrs VS reviewed since prior progress note.  Most recent are:  Visit Vitals    /82 (BP 1 Location: Right arm, BP Patient Position: Sitting)    Pulse 61    Temp 98.3 °F (36.8 °C)    Resp 16    Ht 6' (1.829 m)    Wt 82.4 kg (181 lb 9.6 oz)    SpO2 98%    BMI 24.63 kg/m2       No intake or output data in the 24 hours ending 08/06/17 1041     Physical Examination:             Constitutional:  No acute distress, cooperative, pleasant    ENT:  Oral mucous moist,    Resp:  CTA bilaterally. CV:  Regular rhythm, normal rate,     GI:  Soft, non distended, +Epigastric tenderness. normoactive bowel sounds,     Musculoskeletal:  No edema, warm, 2+ pulses throughout    Neurologic:  Moves all extremities. AAOx3,      Skin:  Good turgor, no rashes or ulcers       Data Review:    Review and/or order of clinical lab test  Review and/or order of tests in the radiology section of CPT  Review and/or order of tests in the medicine section of CPT      Labs:     Recent Labs      08/06/17 0619 08/05/17   1717   WBC  7.0  6.9   HGB  11.8*  10.7*   HCT  33.2*  31.1*   PLT  253  231     Recent Labs      08/06/17 0619 08/05/17   1717   NA  136  135*   K  5.4*  3.9   CL  102  99   CO2  29  30   BUN  18  22*   CREA  1.31*  1.34*   GLU  92  86   CA  9.3  8.9     Recent Labs      08/05/17 1717   SGOT  42*   ALT  48   AP  132*   TBILI  0.5   TP  6.4   ALB  3.0*   GLOB  3.4     No results for input(s): INR, PTP, APTT in the last 72 hours. No lab exists for component: INREXT   No results for input(s): FE, TIBC, PSAT, FERR in the last 72 hours. Lab Results   Component Value Date/Time    Folate 11.2 09/16/2016 11:03 AM      No results for input(s): PH, PCO2, PO2 in the last 72 hours. No results for input(s): CPK, CKNDX, TROIQ in the last 72 hours.     No lab exists for component: CPKMB  Lab Results   Component Value Date/Time    Cholesterol, total 197 04/05/2017 03:43 PM    HDL Cholesterol 40 04/05/2017 03:43 PM    LDL, calculated 154 09/16/2016 11:03 AM    Triglyceride 124 09/16/2016 11:03 AM    CHOL/HDL Ratio 4.5 10/27/2010 07:50 AM     Lab Results   Component Value Date/Time    Glucose (POC) 118 08/02/2017 08:19 AM     Lab Results   Component Value Date/Time    Color YELLOW/STRAW 08/05/2017 06:23 PM    Appearance CLEAR 08/05/2017 06:23 PM    Specific gravity 1.018 08/05/2017 06:23 PM    pH (UA) 6.0 08/05/2017 06:23 PM    Protein NEGATIVE  08/05/2017 06:23 PM    Glucose NEGATIVE  08/05/2017 06:23 PM    Ketone NEGATIVE  08/05/2017 06:23 PM    Bilirubin NEGATIVE  08/05/2017 06:23 PM    Urobilinogen 2.0 08/05/2017 06:23 PM    Nitrites NEGATIVE  08/05/2017 06:23 PM    Leukocyte Esterase NEGATIVE  08/05/2017 06:23 PM    Epithelial cells FEW 06/06/2016 07:42 AM    Bacteria NEGATIVE  06/06/2016 07:42 AM    WBC 0-4 06/06/2016 07:42 AM    RBC 0-5 06/06/2016 07:42 AM         Medications Reviewed:     Current Facility-Administered Medications   Medication Dose Route Frequency    finasteride (PROSCAR) tablet 5 mg  5 mg Oral DAILY    gabapentin (NEURONTIN) tablet 800 mg  800 mg Oral TID    pantoprazole (PROTONIX) tablet 40 mg  40 mg Oral BID    polyethylene glycol (MIRALAX) packet 17 g  17 g Oral DAILY    sucralfate (CARAFATE) 100 mg/mL oral suspension 1 g  1 g Oral QID    lisinopril (PRINIVIL, ZESTRIL) tablet 5 mg  5 mg Oral DAILY    tamsulosin (FLOMAX) capsule 0.8 mg  0.8 mg Oral DAILY    sodium chloride (NS) flush 5-10 mL  5-10 mL IntraVENous Q8H    sodium chloride (NS) flush 5-10 mL  5-10 mL IntraVENous PRN    0.9% sodium chloride infusion  75 mL/hr IntraVENous CONTINUOUS     Facility-Administered Medications Ordered in Other Encounters   Medication Dose Route Frequency    [START ON 8/9/2017] 0.9% sodium chloride infusion  25 mL/hr IntraVENous CONTINUOUS    [START ON 8/9/2017] palonosetron HCl (ALOXI) injection 0.25 mg  0.25 mg IntraVENous ONCE    [START ON 8/9/2017] dexamethasone (DECADRON) 4 mg/mL injection 12 mg  12 mg IntraVENous ONCE    [START ON 8/9/2017] famotidine (PF) (PEPCID) 20 mg in sodium chloride 0.9 % 10 mL injection  20 mg IntraVENous ONCE    [START ON 8/9/2017] diphenhydrAMINE (BENADRYL) injection 25 mg  25 mg IntraVENous ONCE    [START ON 8/9/2017] PACLitaxel (TAXOL) 100 mg in 0.9% sodium chloride 250 mL, overfill volume 25 mL chemo infusion  100 mg IntraVENous ONCE    [START ON 8/9/2017] CARBOplatin (PARAPLATIN) 125 mg in 0.9% sodium chloride 250 mL, overfill volume 25 mL chemo infusion  125 mg IntraVENous ONCE     ______________________________________________________________________  EXPECTED LENGTH OF STAY: - - -  ACTUAL LENGTH OF STAY:          1                 Melodie Hong MD

## 2017-08-06 NOTE — PROGRESS NOTES
Admission Medication Reconciliation:    Information obtained from: Daughter Leonarda Zhang: [phone])    Significant PMH/Disease States:   Past Medical History:   Diagnosis Date    Acid reflux     Arthritis     right  left knees    Back pain, chronic 4/11/2014    Cancer ([facility] [address] 2017    esophegeal    Decreased hearing of both ears 4/5/2017    DNR (do not resuscitate) 4/5/2017    Gastroesophageal reflux disease without esophagitis 6/13/2017    GERD (gastroesophageal reflux disease)     Hip pain, left 4/11/2014    Hypercholesterolemia     Hypertension     Need for varicella vaccine 4/11/2014    Rash of entire body 9/15/2016    Restless leg syndrome        Chief Complaint for this Admission:  Fall, fatigue    Allergies:  Review of patient's allergies indicates no known allergies. Prior to Admission Medications:   Prior to Admission Medications   Prescriptions Last Dose Informant Patient Reported? Taking?   aspirin delayed-release 81 mg tablet 8/5/2017 at am  Yes Yes   Sig: Take 81 mg by mouth daily. calcium-cholecalciferol, d3, (CALCIUM 600 + D) 600-125 mg-unit tab 8/5/2017 at am  Yes Yes   Sig: Take  by mouth daily. cyanocobalamin (VITAMIN B-12) 1,000 mcg tablet 8/5/2017 at am  Yes Yes   Sig: Take 1,000 mcg by mouth daily. finasteride (PROSCAR) 5 mg tablet 8/5/2017 at am  Yes Yes   Sig: Take 5 mg by mouth daily. gabapentin (NEURONTIN) 800 mg tablet 8/5/2017 at 1300  Yes Yes   Sig: Take  by mouth three (3) times daily. lisinopril (PRINIVIL, ZESTRIL) 10 mg tablet 8/3/2017 at 0900  Yes Yes   Sig: Take 5 mg by mouth daily. loratadine (CLARITIN) 10 mg tablet 8/5/2017 at am  Yes Yes   Sig: Take 10 mg by mouth daily. pantoprazole (PROTONIX) 40 mg tablet 8/5/2017 at 1300  Yes Yes   Sig: Take 40 mg by mouth two (2) times a day. Gastric cancer, hx bleeding   polyethylene glycol (MIRALAX) 17 gram packet 8/4/2017 at am  Yes Yes   Sig: Take 17 g by mouth daily.    raNITIdine (ZANTAC) 150 mg tablet 8/5/2017 at am  Yes Yes   Sig: Take 150 mg by mouth two (2) times a day. sucralfate (CARAFATE) 100 mg/mL suspension 8/5/2017 at 1300  Yes Yes   Sig: Take  by mouth four (4) times daily. tamsulosin (FLOMAX) 0.4 mg capsule 8/4/2017 at pm  Yes Yes   Sig: Take 0.8 mg by mouth daily. traMADol (ULTRAM) 50 mg tablet 8/5/2017 at 1300  Yes Yes   Sig: Take 50 mg by mouth every eight (8) hours as needed for Pain. Facility-Administered Medications: None         Comments/Recommendations:   Patient was confused and unable to participate in interview, but daughter was present with complete medication list and updated his administration times. Allergies reviewed and confirmed as NKDA. Please note:  1. Lisinopril held x 2 days due to hypotension at doctor's office  2. Miralax held secondary to diarrhea    Thank you for allowing me to participate in the care of this very pleasant gentleman.

## 2017-08-06 NOTE — CONSULTS
[address] [place] [address]       GASTROENTEROLOGY CONSULTATION NOTE        NAME:  Tammie Pyle Sr.   :   1931   MRN:   [id number]           Consult Date: 2017 11:32 AM      History of Present Illness:  Patient is a 80 y.o. who is seen in consultation at the request of Dr. Nnamdi Rodrigez for dysphagia. He is s/p esophageal stenting by Dr. aBbita Witt last week for esophageal cancer, pt c/o return of same dysphagia few days after that, fluoro done on 17 for porthocath placement suggested stent migration, c/o mild epigastric pain, tolerating only liquids. PMH:  Past Medical History:   Diagnosis Date    Acid reflux     Arthritis     right  left knees    Back pain, chronic 2014    Cancer ([address]     esophegeal    Decreased hearing of both ears 2017    DNR (do not resuscitate) 2017    Gastroesophageal reflux disease without esophagitis 2017    GERD (gastroesophageal reflux disease)     Hip pain, left 2014    Hypercholesterolemia     Hypertension     Need for varicella vaccine 2014    Rash of entire body 9/15/2016    Restless leg syndrome        PSH:  Past Surgical History:   Procedure Laterality Date    ABDOMEN SURGERY PROC UNLISTED      gall bladder    ABDOMEN SURGERY PROC UNLISTED  15 yrs ago    right hernia repair    HX HEENT      EYE SURGERY 40 YRS AGO.  HX ORTHOPAEDIC      fluid removed both knees    HX ORTHOPAEDIC  2010    bilateral knee replacements       Allergies:  No Known Allergies    Home Medications:  Prior to Admission Medications   Prescriptions Last Dose Informant Patient Reported? Taking?   aspirin delayed-release 81 mg tablet 2017 at am  Yes Yes   Sig: Take 81 mg by mouth daily. calcium-cholecalciferol, d3, (CALCIUM 600 + D) 600-125 mg-unit tab 2017 at am  Yes Yes   Sig: Take  by mouth daily.    cyanocobalamin (VITAMIN B-12) 1,000 mcg tablet 2017 at am  Yes Yes   Sig: Take 1,000 mcg by mouth daily. finasteride (PROSCAR) 5 mg tablet 8/5/2017 at am  Yes Yes   Sig: Take 5 mg by mouth daily. gabapentin (NEURONTIN) 800 mg tablet 8/5/2017 at 1300  Yes Yes   Sig: Take  by mouth three (3) times daily. lisinopril (PRINIVIL, ZESTRIL) 10 mg tablet 8/3/2017 at 0900  Yes Yes   Sig: Take 5 mg by mouth daily. loratadine (CLARITIN) 10 mg tablet 8/5/2017 at am  Yes Yes   Sig: Take 10 mg by mouth daily. pantoprazole (PROTONIX) 40 mg tablet 8/5/2017 at 1300  Yes Yes   Sig: Take 40 mg by mouth two (2) times a day. Gastric cancer, hx bleeding   polyethylene glycol (MIRALAX) 17 gram packet 8/4/2017 at am  Yes Yes   Sig: Take 17 g by mouth daily. raNITIdine (ZANTAC) 150 mg tablet 8/5/2017 at am  Yes Yes   Sig: Take 150 mg by mouth two (2) times a day. sucralfate (CARAFATE) 100 mg/mL suspension 8/5/2017 at 1300  Yes Yes   Sig: Take  by mouth four (4) times daily. tamsulosin (FLOMAX) 0.4 mg capsule 8/4/2017 at pm  Yes Yes   Sig: Take 0.8 mg by mouth daily. traMADol (ULTRAM) 50 mg tablet 8/5/2017 at 1300  Yes Yes   Sig: Take 50 mg by mouth every eight (8) hours as needed for Pain.       Facility-Administered Medications: None       Hospital Medications:  Current Facility-Administered Medications   Medication Dose Route Frequency    finasteride (PROSCAR) tablet 5 mg  5 mg Oral DAILY    gabapentin (NEURONTIN) capsule 800 mg  800 mg Oral TID    pantoprazole (PROTONIX) tablet 40 mg  40 mg Oral BID    polyethylene glycol (MIRALAX) packet 17 g  17 g Oral DAILY    sucralfate (CARAFATE) 100 mg/mL oral suspension 1 g  1 g Oral QID    lisinopril (PRINIVIL, ZESTRIL) tablet 5 mg  5 mg Oral DAILY    tamsulosin (FLOMAX) capsule 0.8 mg  0.8 mg Oral DAILY    sodium chloride (NS) flush 5-10 mL  5-10 mL IntraVENous Q8H    sodium chloride (NS) flush 5-10 mL  5-10 mL IntraVENous PRN    0.9% sodium chloride infusion  75 mL/hr IntraVENous CONTINUOUS     Facility-Administered Medications Ordered in Other Encounters   Medication Dose Route Frequency    [START ON 8/9/2017] 0.9% sodium chloride infusion  25 mL/hr IntraVENous CONTINUOUS    [START ON 8/9/2017] palonosetron HCl (ALOXI) injection 0.25 mg  0.25 mg IntraVENous ONCE    [START ON 8/9/2017] dexamethasone (DECADRON) 4 mg/mL injection 12 mg  12 mg IntraVENous ONCE    [START ON 8/9/2017] famotidine (PF) (PEPCID) 20 mg in sodium chloride 0.9 % 10 mL injection  20 mg IntraVENous ONCE    [START ON 8/9/2017] diphenhydrAMINE (BENADRYL) injection 25 mg  25 mg IntraVENous ONCE    [START ON 8/9/2017] PACLitaxel (TAXOL) 100 mg in 0.9% sodium chloride 250 mL, overfill volume 25 mL chemo infusion  100 mg IntraVENous ONCE    [START ON 8/9/2017] CARBOplatin (PARAPLATIN) 125 mg in 0.9% sodium chloride 250 mL, overfill volume 25 mL chemo infusion  125 mg IntraVENous ONCE       Social History:  Social History   Substance Use Topics    Smoking status: Former Smoker     Years: 0.00     Quit date: 4/28/1970    Smokeless tobacco: Never Used    Alcohol use Yes      Comment: OCCASSIONALLY       Family History:  Family History   Problem Relation Age of Onset    Heart Disease Sister        Review of Systems:  Constitutional: negative fever, negative chills, negative weight loss  Eyes:   negative visual changes  ENT:   negative sore throat, tongue or lip swelling  Respiratory:  negative cough, negative dyspnea  Cards:  negative for chest pain, palpitations, lower extremity edema  GI:   See HPI  :  negative for frequency, dysuria  Integument:  negative for rash and pruritus  Heme:  negative for easy bruising and gum/nose bleeding  Musculoskel: negative for myalgias,  back pain and muscle weakness  Neuro: negative for headaches, dizziness, vertigo  Psych:  negative for feelings of anxiety, depression     Objective:   Patient Vitals for the past 8 hrs:   BP Temp Pulse Resp SpO2   08/06/17 0844 167/82 98.3 °F (36.8 °C) 61 16 98 %   08/06/17 0340 148/80 98.1 °F (36.7 °C) 60 16 96 %             EXAM:     NEURO-a&o   HEENT-wnl   LUNGS-clear    COR-regular rate and rhythym     ABD-soft , no tenderness, no rebound, good bs     EXT-no edema     Data Review     Recent Labs      08/06/17   0619 08/05/17 1717   WBC  7.0  6.9   HGB  11.8*  10.7*   HCT  33.2*  31.1*   PLT  253  231     Recent Labs      08/06/17   0619 08/05/17   1717   NA  136  135*   K  5.4*  3.9   CL  102  99   CO2  29  30   BUN  18  22*   CREA  1.31*  1.34*   GLU  92  86   CA  9.3  8.9     Recent Labs      08/05/17 1717   SGOT  42*   AP  132*   TP  6.4   ALB  3.0*   GLOB  3.4     No results for input(s): INR, PTP, APTT in the last 72 hours. No lab exists for component: INREXT       Assessment:   · Esophageal cancer, recurrent of dysphagia, suspect stent migration     Patient Active Problem List   Diagnosis Code    Left knee DJD M17.12    HTN (hypertension) I10    Hypercholesterolemia E78.00    Urinary frequency R35.0    Insomnia G47.00    Kidney function abnormal N28.9    Elevated PSA measurement R97.20    B12 deficiency E53.8    Vitamin D deficiency E55.9    AR (allergic rhinitis) J30.9    Acute bronchitis J20.9    Special screening for osteoporosis Z13.820    Tingling sensation R20.2    Hip pain, left M25.552    Back pain, chronic M54.9, G89.29    Need for varicella vaccine Z23    Chronic constipation K59.09    Rash of entire body R21    DNR (do not resuscitate) Z66    Decreased hearing of both ears H91.93    Encounter for Hemoccult screening Z12.11    Gastroesophageal reflux disease without esophagitis K21.9    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 XXXA     Plan:   · KUB to check location of stent  · NPO after MN for possible repositioning of stent vs PEG tube placement, Dr. Salmon Quick to decide on it  · Discussed with Dr. Tyler Corona By: Dimitri Jimenez MD     8/6/2017  11:32 AM

## 2017-08-06 NOTE — ED NOTES
Pt resting on stretcher, call bell within reach. VSS. NAD. Pt denies pain. Will continue to monitor.

## 2017-08-06 NOTE — PROGRESS NOTES
Problem: Mobility Impaired (Adult and Pediatric)  Goal: *Acute Goals and Plan of Care (Insert Text)  Physical Therapy Goals  Initiated 8/6/2017  1. Patient will move from supine to sit and sit to supine in bed with independence within 3 day(s). 2. Patient will transfer from bed to chair and chair to bed with independence using the least restrictive device within 3 day(s). 3. Patient will perform sit to stand with independence within 3 day(s). 4. Patient will ambulate with modified independence for 350 feet with the least restrictive device within 3 day(s). 5. Patient will ascend/descend 4 stairs with 1 handrail(s) with modified independence within 3 day(s). PHYSICAL THERAPY EVALUATION  Patient: Garry Davila Sr. (80 y.o. male)  Date: 8/6/2017  Primary Diagnosis: Altered mental state  Fall  Dysphasia        Precautions: fall risk          ASSESSMENT :  Based on the objective data described below, the patient presents with decreased mobility and activity tolerance. Patient required supervision for bed mobility and transfers. He does require verbal cues to slow pace and to increase safety. He was able to ambulate 200 ft with use of IV pole as AD and cga. Patient put forth good effort during PT evaluation. Patient will benefit from skilled intervention to address the above impairments.   Patients rehabilitation potential is considered to be Good  Factors which may influence rehabilitation potential include:   [ ]         None noted  [ ]         Mental ability/status  [X]         Medical condition  [ ]         Home/family situation and support systems  [ ]         Safety awareness  [ ]         Pain tolerance/management  [ ]         Other:        PLAN :  Recommendations and Planned Interventions:  [X]           Bed Mobility Training             [X]    Neuromuscular Re-Education  [X]           Transfer Training                   [ ]    Orthotic/Prosthetic Training  [X]           Gait Training [ ]    Modalities  [X]           Therapeutic Exercises           [ ]    Edema Management/Control  [X]           Therapeutic Activities            [ ]    Patient and Family Training/Education  [ ]           Other (comment):     Frequency/Duration: Patient will be followed by physical therapy  5 times a week to address goals. Discharge Recommendations: None  Further Equipment Recommendations for Discharge: encouraged pt to use spc (already has one) when returning home        SUBJECTIVE:   Patient stated this stent is just causing me a fit.       OBJECTIVE DATA SUMMARY:   HISTORY:    Past Medical History:   Diagnosis Date    Acid reflux      Arthritis       right  left knees    Back pain, chronic 4/11/2014    Cancer ([facility] [address] 2017     esophegeal    Decreased hearing of both ears 4/5/2017    DNR (do not resuscitate) 4/5/2017    Gastroesophageal reflux disease without esophagitis 6/13/2017    GERD (gastroesophageal reflux disease)      Hip pain, left 4/11/2014    Hypercholesterolemia      Hypertension      Need for varicella vaccine 4/11/2014    Rash of entire body 9/15/2016    Restless leg syndrome       Past Surgical History:   Procedure Laterality Date    ABDOMEN SURGERY PROC UNLISTED   1980's     gall bladder    ABDOMEN SURGERY PROC UNLISTED   15 yrs ago     right hernia repair    HX HEENT         EYE SURGERY 40 YRS AGO.     HX ORTHOPAEDIC         fluid removed both knees    HX ORTHOPAEDIC   2010     bilateral knee replacements     Prior Level of Function/Home Situation: Pt currently lives with his daughter, does have a few steps to enter home, his bedroom is on the first floor and does have a spc at home  Personal factors and/or comorbidities impacting plan of care:      Home Situation  Home Environment: Private residence  # Steps to Enter: 0  One/Two Story Residence: One story  Living Alone: No  Support Systems: Family member(s)  Patient Expects to be Discharged to[de-identified] Private residence  Current DME Used/Available at Home: Cane, straight     EXAMINATION/PRESENTATION/DECISION MAKING:   Critical Behavior:  Neurologic State: Alert, Confused  Orientation Level: Disoriented to time, Oriented to person, Oriented to place, Oriented to situation  Cognition: Follows commands, Memory loss     Hearing: Auditory  Auditory Impairment: Hard of hearing, bilateral  Skin:    Edema:   Range Of Motion:                          Strength: Tone & Sensation:                                  Coordination:     Vision:      Functional Mobility:  Bed Mobility:  supervision              Transfers:   supervision                           Balance:      Ambulation/Gait Trainin ft with single UE support and cga                                                               Stairs: Therapeutic Exercises:         Functional Measure:        G codes: In compliance with CMSs Claims Based Outcome Reporting, the following G-code set was chosen for this patient based on their primary functional limitation being treated: The outcome measure chosen to determine the severity of the functional limitation was the TUG with a score of 35 seconds which was correlated with the impairment scale.       · Mobility - Walking and Moving Around:               - CURRENT STATUS:    CJ - 20%-39% impaired, limited or restricted               - GOAL STATUS:           CI - 1%-19% impaired, limited or restricted               - D/C STATUS:                       CI - 1%-19% impaired, limited or restricted      Physical Therapy Evaluation Charge Determination   History Examination Presentation Decision-Making   LOW Complexity : Zero comorbidities / personal factors that will impact the outcome / POC LOW Complexity : 1-2 Standardized tests and measures addressing body structure, function, activity limitation and / or participation in recreation  LOW Complexity : Stable, uncomplicated  LOW Complexity : FOTO score of       Based on the above components, the patient evaluation is determined to be of the following complexity level: LOW      Pain:                    Activity Tolerance:      Please refer to the flowsheet for vital signs taken during this treatment. After treatment:   [ ]         Patient left in no apparent distress sitting up in chair  [ ]         Patient left in no apparent distress in bed  [ ]         Call bell left within reach  [ ]         Nursing notified  [ ]         Caregiver present  [ ]         Bed alarm activated      COMMUNICATION/EDUCATION:   The patients plan of care was discussed with: Registered Nurse. [ ]         Fall prevention education was provided and the patient/caregiver indicated understanding. [X]         Patient/family have participated as able in goal setting and plan of care. [ ]         Patient/family agree to work toward stated goals and plan of care. [ ]         Patient understands intent and goals of therapy, but is neutral about his/her participation. [ ]         Patient is unable to participate in goal setting and plan of care.      Thank you for this referral.  Gil Salinas, PT   Time Calculation: 28 mins

## 2017-08-06 NOTE — PROGRESS NOTES
Primary Nurse Arturo Diggs, SEVEN and Kristine Peterson RN performed a dual skin assessment on this patient No impairment noted  Kendall score is 17

## 2017-08-06 NOTE — ED NOTES
Assumed care, verbal and beside report received from Aung Dale, [address] [place] [address]. Pt resting comfortably in bed, monitor x 2,  alert and oriented x 4 with no complaints at this time.

## 2017-08-06 NOTE — H&P
[address] [place] [address], [address] [place] [address]   HISTORY AND PHYSICAL       Name:  Kevin Quarles   MR#:  [id number]   :  1931   Account #:  [de-identified]        Date of Adm:  2017       CHIEF COMPLAINT: Altered mental status and unable to swallow. The patient is an 80-year-old male with a past medical history of   hypertension, esophageal cancer, who presents to the ED for the   above-stated symptoms. The patient sees Dr. Chey Lester, the oncologist,   for his esophageal cancer. Five days ago a stent was placed by Dr. Douglas Andujar. The daughter at the bedside reports that 2 days after the stent   was placed the patient received a chemo port for chemotherapy. At   that time they noticed that the [place] had moved, and there was a plan   to remove the stent and possibly place a PEG tube instead. However,   the daughter says a day after that, on Thursday, the patient became   dizzy, his blood pressure was low. She called his primary care   physician. At that time she was advised to stop his lisinopril secondary   to his hypotension. The daughter reports that around 2:30 p.m. today   the patient fell. He denies hitting his head. He did fall onto his buttocks   and right side, but denies any pain at this time. She says that prior to   his falling he was using his cane more than he usually does to   ambulate. He was having difficulty eating lunch also. She states that   he had breakfast; however, he had pain in the lower portion of his   esophagus, which he attributes to the mass from the cancer. After he   fell around 2:30 she decided to call EMS. When EMS arrived they   checked his blood pressure, which was pretty high. She did not give a   number. The patient did continue having dizziness, and according to   the daughter he was altered, showing signs of confusion.  Upon arrival   to the ED it was documented by the ED physician the patient did have   signs of confusion with conversations that were not necessarily   appropriate. He is alert. He is oriented to person, place and time. There were no acute changes on his labs. He is admitted under the hospitalist   service for further evaluation with plans to consult his oncologist.      Past Medical History:   Diagnosis Date    Acid reflux     Arthritis     right  left knees    Back pain, chronic 4/11/2014    Cancer ([facility] [address] 2017    esophegeal    Decreased hearing of both ears 4/5/2017    DNR (do not resuscitate) 4/5/2017    Gastroesophageal reflux disease without esophagitis 6/13/2017    GERD (gastroesophageal reflux disease)     Hip pain, left 4/11/2014    Hypercholesterolemia     Hypertension     Need for varicella vaccine 4/11/2014    Rash of entire body 9/15/2016    Restless leg syndrome        Past Surgical History:   Procedure Laterality Date    ABDOMEN SURGERY PROC UNLISTED  1980's    gall bladder    ABDOMEN SURGERY PROC UNLISTED  15 yrs ago    right hernia repair    HX HEENT      EYE SURGERY 40 YRS AGO.  HX ORTHOPAEDIC      fluid removed both knees    HX ORTHOPAEDIC  2010    bilateral knee replacements       Social History     Social History    Marital status:      Spouse name: N/A    Number of children: N/A    Years of education: N/A     Occupational History    Not on file. Social History Main Topics    Smoking status: Former Smoker     Years: 0.00     Quit date: 4/28/1970    Smokeless tobacco: Never Used    Alcohol use Yes      Comment: OCCASSIONALLY    Drug use: No    Sexual activity: Not Currently     Other Topics Concern    Not on file     Social History Narrative       Family History   Problem Relation Age of Onset    Heart Disease Sister        No Known Allergies    Review of Systems:  Positive in bold.   CONST: Fever, weight loss, fatigue or chills, difficulty eating  GI: Nausea, vomiting, abdominal pain, change in bowel habits, hematochezia, melena, and GERD   INTEG: Dermatitis, abnormal moles  HEENT: Recent changes in vision, vertigo, epistaxis, dysphagia, hoarseness  CV: Chest pain, palpitations, HTN, edema and varicosities  RESP: Cough, shortness of breath, wheezing, hemoptysis, snoring. : Hematuria, dysuria, frequency, urgency, retention, incontinence   MS: Weakness, joint pain and arthritis  ENDO: Diabetes, thyroid disease, polyuria, polydipsia, polyphagia, poor wound healing, heat intolerance, cold intolerance  LYMPH/HEME: Anemia, bruising and history of blood transfusions  NEURO: Dizziness, headache, fainting, seizures and stroke, confusion  PSYCH: Anxiety , depression    Physical Exam:      Visit Vitals    /90 (BP 1 Location: Left arm, BP Patient Position: At rest)    Pulse 75    Temp 99.2 °F (37.3 °C)    Resp 16    Ht 6' (1.829 m)    Wt 84.7 kg (186 lb 12.8 oz)    SpO2 94%    BMI 25.33 kg/m2       Physical Exam:  Gen:  No distress, alert, awake and oriented x 4  HEENT:  Normal cephalic atraumatic, extra-occular movements are intact. Neck:  Supple, No JVD  Lungs:  Clear bilaterally, no wheeze, no rales, normal effort  Cardiovascular:  Regular Rate and Rhythm, normal S1 and S2, no audible murmur. Capillary refill: < 3 seconds. Abdomen:  Soft, non tender, non distended, normal bowel sounds, no guarding. Extremities:  Well perfused, no cyanosis, no edema   Peripheral pulse:2+  Neurological:  Awake and alert, CN's are intact, normal strength throughout extremities  Skin:  No rashes or moles. Turgor and color normal  Mental Status:  Normal thought process, does not appear anxious      Laboratory Studies: All lab results for the last 24 hours reviewed. Assessment/Plan     Active Problems:    Altered mental state (8/5/2017)      Dysphasia (8/5/2017)      Fall (8/5/2017)        PLAN:    GI: esophogeal cancer with recent stent placement. Keep NPO    CV: HTN.   Blood pressure controlled   Continue antihypertensive medication as per home   Monitor vitals as per unit     Heme:  DVT prophylaxis   Bilateral lower extremity compression devices    Nephro: Renal injury   Repeat chemistry in am   Continuous IV Fluid    Neuro: AMS   Fall precautions   Neurovascular check   Ambulate with assistance    Misc:  FULL CODE   Physical therapy to evaluate and treat   Fall precautions   Ambulate with assistance. Monitor intake and output   Monitor vitals as per unit   Replace electrolytes as needed. Follow up am labs.             Sangita Montes De Oca MD      AR / [address]   D:  08/05/2017   20:38   T:  08/06/2017   00:33   Job #:  [id number]

## 2017-08-06 NOTE — ROUTINE PROCESS
TRANSFER - OUT REPORT:    Verbal report given to Catrachita Vu RN (name) on Nolene Memos.  being transferred to [address] (unit) for routine progression of care       Report consisted of patients Situation, Background, Assessment and   Recommendations(SBAR). Information from the following report(s) SBAR, ED Summary, [facility] and Recent Results was reviewed with the receiving nurse. Lines:   Venous Access Device 08/02/17 Other (comment) (Active)       Peripheral IV 08/05/17 Left Antecubital (Active)   Site Assessment Clean, dry, & intact 8/5/2017  5:08 PM   Phlebitis Assessment 0 8/5/2017  5:08 PM   Infiltration Assessment 0 8/5/2017  5:08 PM   Dressing Status Clean, dry, & intact 8/5/2017  5:08 PM   Dressing Type Transparent 8/5/2017  5:08 PM   Hub Color/Line Status Green 8/5/2017  5:08 PM        Opportunity for questions and clarification was provided.       Patient transported with:   [employer]

## 2017-08-06 NOTE — PROGRESS NOTES
TRANSFER - IN REPORT:    Verbal report received from Norris Guevara RN(name) on [employer]  being received from ED(unit) for routine progression of care      Report consisted of patients Situation, Background, Assessment and   Recommendations(SBAR). Information from the following report(s) SBAR, Kardex, ED Summary, Intake/Output, MAR and Recent Results was reviewed with the receiving nurse. Opportunity for questions and clarification was provided. Assessment completed upon patients arrival to unit and care assumed.

## 2017-08-06 NOTE — PROGRESS NOTES
Bedside and Verbal shift change report given to Rolo Bo (oncoming nurse) by Catrachita Vu (offgoing nurse). Report included the following information SBAR, Kardex and Intake/Output.

## 2017-08-07 NOTE — PROGRESS NOTES
Bedside shift change report given to [address] Navarrete (oncoming nurse) by Stefano Reyes (offgoing nurse). Report included the following information SBAR, Kardex and Recent Results.

## 2017-08-07 NOTE — PROGRESS NOTES
Hospitalist Progress Note  Marilu Rangel MD  Office: [phone]        Date of Service:  2017  NAME:  Darwin Olivera Sr.  :  1931  MRN:  [id number]      Admission Summary:   79 yo male with Recent Dx of Esophageal Ca, HTN, admitted for unable to swallow solids and pain on eating solids. Pt also had a fall at home without head trauma. Pt unclear if was feeling lightheaded at the time of fall. Pt recent had a esophageal stent placement followed by Chemo port placement. During Chemo port placement it was thought that esophageal stent had migrated. Interval history / Subjective:     Doing ok. Denies any complaints. Awaiting procedure today . Wife present at bedside     Assessment & Plan:     Odynophagia,  Likely related to Esophageal cancer  - AXR  esophageal stent has migrated into the fundus of the stomach  - GI Consulted, plan forstent removal and PEG placement on tues      CKD 3  - stable, cr better today    Mild Hyperkalemia, resolved  - monitor     S/p Fall, PTA  - stable with PT: do not need PT at discharge    HTN   - cont LIsinopril     BPH  -Proscar and Flomax          Code status: DNR  DVT prophylaxis: [address] discussed with: Patient/Family  Disposition: Home w/Family     Hospital Problems  Date Reviewed: 2017          Codes Class Noted POA    Altered mental state ICD-10-CM: R41.82  ICD-9-CM: 780.97  2017 Yes        * (Principal)Dysphasia ICD-10-CM: R47.02  ICD-9-CM: 784.59  2017 Yes        Fall ICD-10-CM: K37. Alea Budds  ICD-9-CM: T966.7  2017 Yes                Review of Systems:   A comprehensive review of systems was negative except for that written in the HPI. Vital Signs:    Last 24hrs VS reviewed since prior progress note.  Most recent are:  Visit Vitals    /86 (BP 1 Location: Left arm, BP Patient Position: At rest)    Pulse 75    Temp 98.7 °F (37.1 °C)    Resp 18    Ht 6' (1.829 m)    Wt 82.4 kg (181 lb 9.6 oz)    SpO2 97%    BMI 24.63 kg/m2         Intake/Output Summary (Last 24 hours) at 08/07/17 1131  Last data filed at 08/06/17 2117   Gross per 24 hour   Intake              492 ml   Output              375 ml   Net              117 ml        Physical Examination:             Constitutional:  No acute distress, cooperative, pleasant    ENT:  Oral mucous moist,    Resp:  CTA bilaterally. CV:  Regular rhythm, normal rate,     GI:  Soft, non distended, +Epigastric tenderness. normoactive bowel sounds,     Musculoskeletal:  No edema, warm, 2+ pulses throughout    Neurologic:  Moves all extremities. AAOx3,      Skin:  Good turgor, no rashes or ulcers       Data Review:    Review and/or order of clinical lab test  Review and/or order of tests in the radiology section of CPT  Review and/or order of tests in the medicine section of CPT      Labs:     Recent Labs      08/06/17 0619  08/05/17   1717   WBC  7.0  6.9   HGB  11.8*  10.7*   HCT  33.2*  31.1*   PLT  253  231     Recent Labs      08/07/17   0619  08/06/17   0619  08/05/17   1717   NA  139  136  135*   K  4.0  5.4*  3.9   CL  105  102  99   CO2  27  29  30   BUN  13  18  22*   CREA  1.22  1.31*  1.34*   GLU  102*  92  86   CA  9.1  9.3  8.9     Recent Labs      08/05/17   1717   SGOT  42*   ALT  48   AP  132*   TBILI  0.5   TP  6.4   ALB  3.0*   GLOB  3.4     No results for input(s): INR, PTP, APTT in the last 72 hours. No lab exists for component: INREXT, INREXT   No results for input(s): FE, TIBC, PSAT, FERR in the last 72 hours. Lab Results   Component Value Date/Time    Folate 11.2 09/16/2016 11:03 AM      No results for input(s): PH, PCO2, PO2 in the last 72 hours. No results for input(s): CPK, CKNDX, TROIQ in the last 72 hours.     No lab exists for component: CPKMB  Lab Results   Component Value Date/Time    Cholesterol, total 197 04/05/2017 03:43 PM    HDL Cholesterol 40 04/05/2017 03:43 PM    LDL, calculated 154 09/16/2016 11:03 AM    Triglyceride 124 09/16/2016 11:03 AM    CHOL/HDL Ratio 4.5 10/27/2010 07:50 AM     Lab Results   Component Value Date/Time    Glucose (POC) 118 08/02/2017 08:19 AM     Lab Results   Component Value Date/Time    Color YELLOW/STRAW 08/05/2017 06:23 PM    Appearance CLEAR 08/05/2017 06:23 PM    Specific gravity 1.018 08/05/2017 06:23 PM    pH (UA) 6.0 08/05/2017 06:23 PM    Protein NEGATIVE  08/05/2017 06:23 PM    Glucose NEGATIVE  08/05/2017 06:23 PM    Ketone NEGATIVE  08/05/2017 06:23 PM    Bilirubin NEGATIVE  08/05/2017 06:23 PM    Urobilinogen 2.0 08/05/2017 06:23 PM    Nitrites NEGATIVE  08/05/2017 06:23 PM    Leukocyte Esterase NEGATIVE  08/05/2017 06:23 PM    Epithelial cells FEW 06/06/2016 07:42 AM    Bacteria NEGATIVE  06/06/2016 07:42 AM    WBC 0-4 06/06/2016 07:42 AM    RBC 0-5 06/06/2016 07:42 AM         Medications Reviewed:     Current Facility-Administered Medications   Medication Dose Route Frequency    finasteride (PROSCAR) tablet 5 mg  5 mg Oral DAILY    gabapentin (NEURONTIN) capsule 800 mg  800 mg Oral TID    pantoprazole (PROTONIX) tablet 40 mg  40 mg Oral BID    polyethylene glycol (MIRALAX) packet 17 g  17 g Oral DAILY    sucralfate (CARAFATE) 100 mg/mL oral suspension 1 g  1 g Oral QID    lisinopril (PRINIVIL, ZESTRIL) tablet 5 mg  5 mg Oral DAILY    tamsulosin (FLOMAX) capsule 0.8 mg  0.8 mg Oral DAILY    cloNIDine HCl (CATAPRES) tablet 0.1 mg  0.1 mg Oral Q6H PRN    sodium chloride (NS) flush 5-10 mL  5-10 mL IntraVENous Q8H    sodium chloride (NS) flush 5-10 mL  5-10 mL IntraVENous PRN    0.9% sodium chloride infusion  75 mL/hr IntraVENous CONTINUOUS     Facility-Administered Medications Ordered in Other Encounters   Medication Dose Route Frequency    [START ON 8/9/2017] 0.9% sodium chloride infusion  25 mL/hr IntraVENous CONTINUOUS    [START ON 8/9/2017] palonosetron HCl (ALOXI) injection 0.25 mg  0.25 mg IntraVENous ONCE    [START ON 8/9/2017] dexamethasone (DECADRON) 4 mg/mL injection 12 mg  12 mg IntraVENous ONCE    [START ON 8/9/2017] famotidine (PF) (PEPCID) 20 mg in sodium chloride 0.9 % 10 mL injection  20 mg IntraVENous ONCE    [START ON 8/9/2017] diphenhydrAMINE (BENADRYL) injection 25 mg  25 mg IntraVENous ONCE    [START ON 8/9/2017] PACLitaxel (TAXOL) 100 mg in 0.9% sodium chloride 250 mL, overfill volume 25 mL chemo infusion  100 mg IntraVENous ONCE    [START ON 8/9/2017] CARBOplatin (PARAPLATIN) 125 mg in 0.9% sodium chloride 250 mL, overfill volume 25 mL chemo infusion  125 mg IntraVENous ONCE     ______________________________________________________________________  EXPECTED LENGTH OF STAY: - - -  ACTUAL LENGTH OF STAY:          2                 Matthew Rios MD

## 2017-08-07 NOTE — PROGRESS NOTES
Problem: Mobility Impaired (Adult and Pediatric)  Goal: *Acute Goals and Plan of Care (Insert Text)  Physical Therapy Goals  Initiated 8/6/2017  1. Patient will move from supine to sit and sit to supine in bed with independence within 3 day(s). 2. Patient will transfer from bed to chair and chair to bed with independence using the least restrictive device within 3 day(s). 3. Patient will perform sit to stand with independence within 3 day(s). 4. Patient will ambulate with modified independence for 350 feet with the least restrictive device within 3 day(s). 5. Patient will ascend/descend 4 stairs with 1 handrail(s) with modified independence within 3 day(s). PHYSICAL THERAPY TREATMENT  Patient: Vignesh Sinclair Sr. (80 y.o. male)  Date: 8/7/2017  Diagnosis: Altered mental state  Fall  Dysphasia  Dysphagia Dysphasia  Procedure(s) (LRB):  ESOPHAGOGASTRODUODENOSCOPY (EGD) with Stent Removal (N/A)  PERCUTANEOUS ENDOSCOPIC GASTROSTOMY TUBE INSERTION (N/A)    Precautions:        ASSESSMENT:  Patient received in bed and willing to work with therapy. Moving well in bed and donned his PJ bottoms without assist.  Ambulated the unit with fairly steady gait with only one brief balance issue but self corrected. Returned to room to sit up in chair. Likely close to baseline but with some confusion, feel best to have staff walk with him in room and montano. Will follow 3x/week at this time. Progression toward goals:  [ ]    Improving appropriately and progressing toward goals  [ ]    Improving slowly and progressing toward goals  [ ]    Not making progress toward goals and plan of care will be adjusted       PLAN:  Patient continues to benefit from skilled intervention to address the above impairments. Will follow 3x/week  Discharge Recommendations:  Home Health vs None  Further Equipment Recommendations for Discharge:  none       SUBJECTIVE:   Patient stated It feels good to walk.       OBJECTIVE DATA SUMMARY:   Critical Behavior:  Neurologic State: Confused, Alert (periodic confusion)  Orientation Level: Oriented to place, Oriented to situation, Oriented to person  Cognition: Impulsive, Follows commands     Functional Mobility Training:  Bed Mobility:     Supine to Sit: Modified independent     Scooting: Independent        Transfers:  Sit to Stand: Supervision  Stand to Sit: Supervision     Stand Pivot Transfers: Supervision                       Balance:  Sitting: Intact  Standing: Impaired; Without support  Standing - Static: Good  Standing - Dynamic : Good  Ambulation/Gait Training:  Distance (ft): 300 Feet (ft)  Assistive Device: Gait belt  Ambulation - Level of Assistance: Contact guard assistance        Gait Abnormalities: Decreased step clearance; Path deviations              Speed/Rocio: Slow            After treatment:   [X]    Patient left in no apparent distress sitting up in chair  [ ]    Patient left in no apparent distress in bed  [X]    Call bell left within reach  [X]    Nursing notified  [ ]    Caregiver present  [X]    Bed alarm activated      COMMUNICATION/COLLABORATION:   The patients plan of care was discussed with: Registered Nurse     Pato Saab, PT   Time Calculation: 13 mins

## 2017-08-07 NOTE — PROGRESS NOTES
Bedside and Verbal shift change report given to Francesca Ortiz (oncoming nurse) by Buzzy Severance, RN and Rebeka Fernandes RN (offgoing nurse). Report included the following information SBAR, Kardex and MAR.

## 2017-08-07 NOTE — PROGRESS NOTES
[address]  [address]   [phone]                GI PROGRESS NOTE  Alice Hodges, [facility]  Work Cell: [phone]      NAME:   Leon Randolph Sr.   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 Symptoms likely related to stent migration as evidenced by KUB. - Supportive management per primary team  - Clear liquids as tolerated   - NPO after midnight  - Plan for EGD with stent removal and PEG tube placement tomorrow  - Further recommendations to follow      Patient Active Problem List   Diagnosis Code    Left knee DJD M17.12    HTN (hypertension) I10    Hypercholesterolemia E78.00    Urinary frequency R35.0    Insomnia G47.00    Kidney function abnormal N28.9    Elevated PSA measurement R97.20    B12 deficiency E53.8    Vitamin D deficiency E55.9    AR (allergic rhinitis) J30.9    Acute bronchitis J20.9    Special screening for osteoporosis Z13.820    Tingling sensation R20.2    Hip pain, left M25.552    Back pain, chronic M54.9, G89.29    Need for varicella vaccine Z23    Chronic constipation K59.09    Rash of entire body R21    DNR (do not resuscitate) Z66    Decreased hearing of both ears H91.93    Encounter for Hemoccult screening Z12.11    Gastroesophageal reflux disease without esophagitis K21.9    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 XXXA       Subjective:     Sitting comfortably in chair. Denies any nausea, vomiting or abdominal pain.        Review of Systems    Constitutional: negative fever, negative chills, negative weight loss  Eyes:   negative visual changes  ENT:   negative sore throat, tongue or lip swelling  Respiratory:  negative cough, negative dyspnea  Cards:  negative for chest pain, palpitations, lower extremity edema  GI:   See HPI  :  negative for frequency, dysuria  Integument:  negative for rash and pruritus  Heme:  negative for easy bruising and gum/nose bleeding  Musculoskel: negative for myalgias, back pain and muscle weakness  Neuro: negative for headaches, dizziness, vertigo  Psych:  negative for feelings of anxiety, depression     Objective:     VITALS:   Last 24hrs VS reviewed since prior hospitalist progress note. Most recent are:  Visit Vitals    /86 (BP 1 Location: Left arm, BP Patient Position: At rest)    Pulse 75    Temp 98.7 °F (37.1 °C)    Resp 18    Ht 6' (1.829 m)    Wt 82.4 kg (181 lb 9.6 oz)    SpO2 97%    BMI 24.63 kg/m2       Intake/Output Summary (Last 24 hours) at 08/07/17 1142  Last data filed at 08/06/17 2117   Gross per 24 hour   Intake              492 ml   Output              375 ml   Net              117 ml        PHYSICAL EXAM:  General   well developed, thin, elderly, alert, in no acute distress  EENT  Normocephalic, Atraumatic, PERRLA, EOMI, sclera clear  Respiratory   Clear To Auscultation bilaterally - no wheezes, rales, rhonchi, or crackles  Cardiology  Regular Rate and Rythmn  - no murmurs, rubs or gallops  Abdominal  Soft, non-tender, non-distended, positive bowel sounds, no hepatosplenomegaly, no palpable mass  Extremities  No clubbing, cyanosis, or edema. Pulses intact. Neurological  No focal neurological deficits noted  Psychological  Oriented x 3. Normal affect.        Lab Data   Recent Results (from the past 12 hour(s))   METABOLIC PANEL, BASIC    Collection Time: 08/07/17  6:19 AM   Result Value Ref Range    Sodium 139 136 - 145 mmol/L    Potassium 4.0 3.5 - 5.1 mmol/L    Chloride 105 97 - 108 mmol/L    CO2 27 21 - 32 mmol/L    Anion gap 7 5 - 15 mmol/L    Glucose 102 (H) 65 - 100 mg/dL    BUN 13 6 - 20 MG/DL    Creatinine 1.22 0.70 - 1.30 MG/DL    BUN/Creatinine ratio 11 (L) 12 - 20      GFR est AA >60 >60 ml/min/1.73m2    GFR est non-AA 56 (L) >60 ml/min/1.73m2    Calcium 9.1 8.5 - 10.1 MG/DL         Medications: Reviewed    PMH/SH reviewed - no change compared to H&P  Mid-Level Provider: Reji Sorenson NP   Date/Time:  8/7/2017        I have personally reviewed the history and independently examined the patient. I have reviewed the chart and agree with the documentation recorded by the Mid Level Provider, including the assessment, treatment plan, and disposition.       Srini Wynn MD

## 2017-08-07 NOTE — PROGRESS NOTES
NUTRITION COMPLETE ASSESSMENT    RECOMMENDATIONS:   1. Diet order per GI    2. Once ok to use PEG tube, would recommend progression as below:  Day 1:  Two Rubens HN, 120 mL q3 hours x 3 feedings + 100 mL flush before and after each    Day 2:  1st feeding (0600): 120 mL + 100 mL flush before and after  2nd-5th feed (0900, 1200): 240 mL + 100 mL flush before and after    Day 3:  GOAL: Two Rubens HN, 240 mL 5x/day (0600, 0900, 1200, 1500, 1800) + 100 mL flush before and after each     Interventions/Plan:   Food/Nutrient Delivery: PEG placement for nutrition support    Assessment:   Reason for Assessment:   [x] At Risk (PEG placement tomorrow)    Diet:  NPO  Nutritionally Significant Medications: [x] Reviewed & Includes: 0.9% sodium chloride @ 75 mL/hr; protonix BID; miralax daily; carafate 4x/day    Subjective:  Pt in good spirits. Tired, but ready for the plan and tube placement tomorrow. Lives with his daughter who will be helping him on discharge. Daughter not present during visit. Objective:  Chart reviewed, discussed with RN and team during interdisciplinary rounds. Pt admitted with dysphagia. PMhx: newly diagnosed esophageal cancer with recent stent placement, GERD, HTN, others noted. Pt with severe weight loss over the past 4 months (24#, 12%). He was taking mostly liquids, but has never tried ONS. GI following closely and plan is for pt to have stent removed vs exchanged tomorrow with possible PEG placement for ongoing nutrition support. Would recommend initiating Two Rubens HN, 120 mL and progress as above to goal of 5 cans/day + 100 mL flush before and after each feeding + additional 250 mL q day. This will provide 2375 kcal, 100 g protein and 2080 mL total free water (tf + flush) and meet 96% and 100% of estimated kcal and protein needs, respectively.     Meets Criteria for Chronic Malnutrition   [x] Severe Malnutrition, as evidenced by:   [x] Severe muscle wasting, loss of subcutaneous fat   [] Nutritional intake of <75% of recommended intake for >1 month   [x] Weight loss of  >5% in 1 month, >7.5% in 3 months, >10% in 6 months, >20% in 1 year   [] Severe edema     Estimated Nutrition Needs:   Kcals/day: 2472 Kcals/day (9288-2196 kcal/day (30-32 kcal/kg))  Protein: 99 g (1.2 g/kg -- adj per renal function)  Fluid: 2472 ml (1 mL/kcal)     Based On: Kcal/kg - specify (Comment)  Weight Used: Actual wt (82.4 kg)    Pt expected to meet estimated nutrient needs:  [x]   Yes    Nutrition Diagnosis:   1. Inadequate protein-energy intake related to increased energy expenditure and dysphagia as evidenced by esophageal cancer, plans for stent removal/replacement and need for PEG for nutrition support    Goals:     Pt to meet at least 90% of estimated needs via enteral nutrition support over the next 5-7 days     Monitoring & Evaluation:    - Total energy intake, Enteral/parenteral nutrition intake   - Weight/weight change     Previous Nutrition Goals Met:  N/A  Previous Recommendations:      N/A    Education & Discharge Needs:   [x] None Identified   [] Identified and addressed    [x] Participated in care plan, discharge planning, and/or interdisciplinary rounds        Cultural, Rastafarian and ethnic food preferences identified:  NONE      Skin Integrity: [x]Intact  []Other  Edema: [x]None []Other  Last BM: 8/7/17  Food Allergies: [x]None []Other    Anthropometrics:    Weight Loss Metrics 8/5/2017 8/2/2017 7/28/2017 7/27/2017 7/25/2017 7/24/2017 7/18/2017   Today's Wt 181 lb 9.6 oz 183 lb 183 lb 3.2 oz 188 lb 200 lb 184 lb 6 oz 192 lb   BMI 24.63 kg/m2 24.82 kg/m2 24.85 kg/m2 25.5 kg/m2 27.12 kg/m2 25.01 kg/m2 26.04 kg/m2      Last 3 Recorded Weights in this Encounter    08/05/17 1704 08/05/17 2131   Weight: 84.7 kg (186 lb 12.8 oz) 82.4 kg (181 lb 9.6 oz)      Weight Source: Standing scale (comment)  Height: 6' (182.9 cm),    Body mass index is 24.63 kg/(m^2).   IBW : 80.7 kg (178 lb),    Usual Body Weight: 95.3 kg (210 lb),      Labs:    Lab Results   Component Value Date/Time    Sodium 139 08/07/2017 06:19 AM    Potassium 4.0 08/07/2017 06:19 AM    Chloride 105 08/07/2017 06:19 AM    CO2 27 08/07/2017 06:19 AM    Glucose 102 08/07/2017 06:19 AM    BUN 13 08/07/2017 06:19 AM    Creatinine 1.22 08/07/2017 06:19 AM    Calcium 9.1 08/07/2017 06:19 AM    Albumin 3.0 08/05/2017 05:17 PM     Lab Results   Component Value Date/Time    Hemoglobin A1c 5.7 08/06/2015 01:28 PM     Lab Results   Component Value Date/Time    Glucose 102 08/07/2017 06:19 AM    Glucose (POC) 118 08/02/2017 08:19 AM      Lab Results   Component Value Date/Time    ALT (SGPT) 48 08/05/2017 05:17 PM    AST (SGOT) 42 08/05/2017 05:17 PM    Alk.  phosphatase 132 08/05/2017 05:17 PM    Bilirubin, direct 0.3 10/27/2010 07:50 AM    Bilirubin, total 0.5 08/05/2017 05:17 PM      Berta De Jesus, [address]

## 2017-08-08 NOTE — PROCEDURES
[address]  [address] [facility], [address] [place] [address]                      :  Madhavi An MD    Referring Provider: Liza Iyer MD    Sedation:  MAC anesthesia Propofol        Prior to the procedure its objectives, risks, consequences and alternatives were discussed with the patient who then elected to proceed. The patient had the opportunity to ask questions and those questions were answered. A physical exam was performed. The heart, lungs, and mental status were examined prior to the procedure and found to be satisfactory for conscious sedation and for the procedure. Conscious sedation was initiated by the physician. Continuous pulse oximetry and blood pressure monitoring were used throughout the procedure. After appropriate pharyngeal anesthesia, the endoscope was passed into the esophagus without difficulty. The proximal esophagus is normal and GE junction revealed a malignant appearing stricture. The esophageal stent had migrated into the proximal stomach and was removed using rat tooth forceps. The fundus revealed the malignant appearing mass while, body, antrum, pylorus, bulb and postbulbar area are unremarkable. On slow withdrawal of the scope, the stomach was transilluminated into the abdominal wall. Under sterile conditions and 1% Xylocaine anesthesia, a small incision was made in the abdominal wall. A needle was passed through the incision, and under direct vision into the stomach. A wire was passed through the needle, snared and brought out the mouth. The PEG tube was passed over the wire and brought out the abdominal wall without difficulty. The scope was then reinserted and the positioning of the PEG tube was excellent. He tolerated the procedure without complication and will return to He room in satisfactory condition. Specimen Removed:  none    Complications: None. EBL:  None. Recommendations: 1. May use PEG for meds and water flushes today     2.  Start PEG feedings in AM per dietary recommendations    Edith Corral MD  8/8/2017  2:21 PM

## 2017-08-08 NOTE — PROGRESS NOTES
Bedside and Verbal shift change report given to Keli AMEZCUA (oncoming nurse) by Catherine Cuellar (offgoing nurse). Report included the following information SBAR, Kardex, Intake/Output, MAR, Recent Results and Med Rec Status. 2258 - Called code atlas around 2200. Pt charging at nurses and turn team, trying to leave the floor and yelling. He was swinging and yelling to let him out of here. He said he was at the [place] and he was ready to leave. Pt pulled out IV and code atlas was called when he started swinging at nurses. Security came and deescalated pt. Pt is currently watching TV with sitter present. He has refused all of his night meds. He says, \" I don't trust you. You work for those guys that were in here. You're a witch. \" Dr ordered haldol 2 mg IM. Will wait for reinforcements to administer and to restart IV access.

## 2017-08-08 NOTE — PROGRESS NOTES
Primary Nurse Pham Moss RN and Haris Rodriguez RN performed a dual skin assessment on this patient Impairment noted- see wound doc flow sheet  Kendall score is 19

## 2017-08-08 NOTE — PROGRESS NOTES
Spiritual Care Partner Volunteer visited patient in [address] on 8/8/17. Documented by:  Ulysses Milroy, M.Div.    Paging Service [phone])

## 2017-08-08 NOTE — PROGRESS NOTES
Pt had large diarrhea stool.  Bear hugger removed at this time Spoke with pt and advised that our prior auth dept is still working on obtaining clearance.  Also advised she can take tylenol up to 1000mg BID.  Do not exceed 3000mg per day.  She stated understanding.  She has an appt with Dr. Hamm on Wed, can discuss further then.

## 2017-08-08 NOTE — PROGRESS NOTES
10:45 AM  Patient reviewed in rounds. Patient has a diagnosis of esophageal cancer. CM informed by NP that patient will be getting a peg tube placed today and will need home health services at discharge. CM awaiting consults and recommendations. CM will continue to follow and assist with disposition needs as they arise.     TAM Mosquera/CRM

## 2017-08-08 NOTE — PROGRESS NOTES
TRANSFER - OUT REPORT:    Verbal report given to Yoko Rodrigues RN(name) on [facility]  being transferred to (unit) for change in patient condition(tachycardia needs telemetry)       Report consisted of patients Situation, Background, Assessment and   Recommendations(SBAR). Information from the following report(s) SBAR, Kardex, ED Summary and Recent Results was reviewed with the receiving nurse. Lines:   Venous Access Device 08/02/17 Other (comment) (Active)   Central Line Being Utilized No 8/7/2017  3:28 PM   Action Taken Other (comment) 8/7/2017  3:28 PM       Peripheral IV 08/08/17 Right Wrist (Active)   Site Assessment Clean, dry, & intact 8/8/2017  4:11 PM   Phlebitis Assessment 0 8/8/2017  4:11 PM   Infiltration Assessment 0 8/8/2017  4:11 PM   Dressing Status Clean, dry, & intact 8/8/2017  4:11 PM   Dressing Type Transparent 8/8/2017  4:11 PM   Hub Color/Line Status Capped 8/8/2017  4:11 PM   Alcohol Cap Used Yes 8/8/2017  4:11 PM        Opportunity for questions and clarification was provided.       Patient transported with:   Contour

## 2017-08-08 NOTE — PROGRESS NOTES
\"The pain is getting better' Dr Amira Yoon here to check on pt. Pt will go back to the floor on 3 liters NC per Dr Amira Yoon.  Pt encouraged to cough at intervals and to cough up phlegm

## 2017-08-08 NOTE — PROGRESS NOTES
Physical Therapist     Reviewed chart. Attempted to treat pt. Transport present to take pt off the floor for procedure. Deferred for the day. Will continue to follow.

## 2017-08-08 NOTE — PROGRESS NOTES
Patient arrived to unit at 1900. Alert and oriented x 4. MD at bedside completing assessment of newly placed G-tube, received report from Antonella Bautista [facility] on [address]. Received patient from endoscopy following procedure. Patient is tachycardic, skin is intact with the exception of peg site that is bandaged with binder in place. Medicated for pain per endo nurse prior to transfer. Patient is tachycardic and being monitored for rapid pace. Dual skin note completed, ngoc score completed.

## 2017-08-08 NOTE — PROGRESS NOTES
Dr Imani Nava talked to his NP and pt will go back to his room and the hospitalist will transfer the pt to telemetry and will start antibioitcs as recommended per Dr Imani Nava

## 2017-08-08 NOTE — PROGRESS NOTES
Bedside shift change report given to 1900 Naomi Navarrete (oncoming nurse) by Gunner Anderson RN (offgoing nurse). Report included the following information SBAR, Kardex and Recent Results.

## 2017-08-08 NOTE — ANESTHESIA POSTPROCEDURE EVALUATION
Post-Anesthesia Evaluation and Assessment    Patient: Bladimir Solorzano Sr. MRN: [id number]  SSN: [id number]    YOB: 1931  Age: 80 y.o. Sex: male       Cardiovascular Function/Vital Signs  Visit Vitals    /87    Pulse 78    Temp 36.7 °C (98.1 °F)    Resp 18    Ht 6' (1.829 m)    Wt 82.4 kg (181 lb 9.6 oz)    SpO2 98%    BMI 24.63 kg/m2       Patient is status post MAC anesthesia for Procedure(s):  ESOPHAGOGASTRODUODENOSCOPY (EGD)   PERCUTANEOUS ENDOSCOPIC GASTROSTOMY TUBE INSERTION  ENDOSCOPY WITH PROSTHESIS OR STENT REMOVAL. Nausea/Vomiting: None    Postoperative hydration reviewed and adequate. Pain:  Pain Scale 1: Numeric (0 - 10) (08/08/17 1512)  Pain Intensity 1: 5 (08/08/17 1512)   Managed    Neurological Status:   Neuro  Neurologic State: Alert (08/08/17 6251)  Orientation Level: Oriented to person;Oriented to place;Oriented to situation;Disoriented to time (08/08/17 0800)  Cognition: Follows commands; Impulsive;Memory loss; Appropriate for age attention/concentration (08/08/17 5067)   At baseline    Mental Status and Level of Consciousness: Arousable    Pulmonary Status:   O2 Device: Non-rebreather mask (08/08/17 1512)   Adequate oxygenation and airway patent    Complications related to anesthesia: None    Post-anesthesia assessment completed.  No concerns    Signed By: Zoya Eckert DO     August 8, 2017

## 2017-08-08 NOTE — PROGRESS NOTES
Faculty or Preceptor Review of Student Work    8/8/2017  - Shift times [phone]  to [phone]    The student documentation of patient care for Noel YrnpaolaLayo has been reviewed and approved.   Ruthie Clemons RN

## 2017-08-08 NOTE — PROGRESS NOTES
EKG ordered. Dr Elmer Horn placing 2nd IV line (unsuccess ful attempt) Pt placed on bear hugger due to chills.

## 2017-08-08 NOTE — PROGRESS NOTES

## 2017-08-08 NOTE — PROGRESS NOTES
12:47 PM  Patient c/o sore buttocks, the bottom area examined by RN and daughter and found to have a bedsore at the crack, patient advised to turn to the right side to keep pressure away from the sore.

## 2017-08-08 NOTE — PROGRESS NOTES
PEG tube inserted by Dr. Therese Ghosh catalog # [id number]  Product # [id number]  Lot # [id number]   Expiration date 2018-12-31

## 2017-08-08 NOTE — PROGRESS NOTES
Brittanystoney Emmett .  7/29/1931  [id number]          Situation:  Verbal report received from: williams Estevez  Preoperative diagnosis: Dysphagia  Patient stated . Background:  Procedure: Procedure(s):  ESOPHAGOGASTRODUODENOSCOPY (EGD)   PERCUTANEOUS ENDOSCOPIC GASTROSTOMY TUBE INSERTION  ENDOSCOPY WITH PROSTHESIS OR STENT PLACEMENT  Physician performing procedure; Dr. Seferino Almanzar    Patient diabetic no   Patient taking blood thinners no   Patient has a defibrillator: no   Patient needs antibiotics: no     Assessment:  Vital signs stable -yes  Alert and oriented -yes during the day but gets disoriented in the evening and night time.   Abdominal assessment: round and soft       Recommendation:  Endoscopic Procedure  Family or Friend   Permission to share finding with Family or Friend yes

## 2017-08-08 NOTE — PROGRESS NOTES
Dr Bobie Osgood at bedside.  Ordered Fentanyl 25 mcg IV and pt then placed on a 100% non-rebreather mask

## 2017-08-08 NOTE — ROUTINE PROCESS
TRANSFER - OUT REPORT:    Verbal report given to [address] [place] [address].  being transferred to [facility] for routine progression of care       Report consisted of patients Situation, Background, Assessment and   Recommendations(SBAR). Information from the following report(s) SBAR was reviewed with the receiving nurse. Lines:   Venous Access Device 08/02/17 Other (comment) (Active)   Central Line Being Utilized No 8/7/2017  3:28 PM   Action Taken Other (comment) 8/7/2017  3:28 PM       Peripheral IV 08/08/17 Right Wrist (Active)   Site Assessment Clean, dry, & intact 8/8/2017  5:00 AM   Phlebitis Assessment 0 8/8/2017  5:00 AM   Infiltration Assessment 0 8/8/2017  5:00 AM   Dressing Status New 8/8/2017  5:00 AM   Dressing Type Transparent;Tape 8/8/2017  5:00 AM   Alcohol Cap Used Yes 8/8/2017  5:00 AM        Opportunity for questions and clarification was provided.       Patient transported with:  Transportation tech

## 2017-08-08 NOTE — ANESTHESIA PREPROCEDURE EVALUATION
Anesthetic History   No history of anesthetic complications            Review of Systems / Medical History  Patient summary reviewed, nursing notes reviewed and pertinent labs reviewed    Pulmonary  Within defined limits                 Neuro/Psych   Within defined limits           Cardiovascular    Hypertension                   GI/Hepatic/Renal     GERD           Endo/Other        Arthritis and cancer     Other Findings              Physical Exam    Airway  Mallampati: II  TM Distance: > 6 cm  Neck ROM: normal range of motion   Mouth opening: Normal     Cardiovascular  Regular rate and rhythm,  S1 and S2 normal,  no murmur, click, rub, or gallop             Dental    Dentition: Poor dentition     Pulmonary  Breath sounds clear to auscultation               Abdominal  GI exam deferred       Other Findings            Anesthetic Plan    ASA: 3  Anesthesia type: MAC          Induction: Intravenous  Anesthetic plan and risks discussed with: Patient

## 2017-08-08 NOTE — PROGRESS NOTES
Hospitalist Progress Note  Wing Jonathan NP  Office: [phone]  Phone [phone]     Date of Service:  2017  NAME:  Fabiola Bahena Sr.  :  1931  MRN:  [id number]    Admission Summary:   Pt presented to the ED for altered mental status and inability to tolerated solid foods. ~ Five days PTA, an esophageal stent was placed by Dr. Leland Marquez then ~ 2 days PTA, a port was placed in anticipation for chemo. At   the time of port placement, it was noticed that the esophageal stent may have migrated. Plans were made to remove the stent and place a PEG but then the pt started having dizziness and low blood pressure. She contacted his PCP who advised him to stop taking his lisinopril. He then fell and continued to have dizziness and have signs of confusion so called EMS. Interval history / Subjective:   Pt in chair, eager to have procedure done today. Noted that pt was a code atlas last night - got agitated and wanted to go home. 1700: Contacted by endo - pt with suspected aspiration: BP and HR elevated. Plans to transfer to [facility] and start on Zosyn. Pt was given 3 500 mL boluses in ED and esmolol to help with BP and HR. Called and spoke with endoscopy nurse and updated pts daughter. Assessment & Plan:     Odynophagia,  likely related to Esophageal cancer:  - Dr. Adolph Hills follows pt for his esophageal cancer - he is to start chemo next week  - CXR : esophageal stent migrated into the fundus of the stomach  - GI Consulted, plan for stent removal and PEG placement today, then diet as tolerated. RD has provided feeding recommendations for PEG tube.   - daughter will be performing tube care - needs to be present for education    CKD 3: stable    Mild Hyperkalemia: resolved    S/p Fall, PTA: no PT needs on d/c    Hx HTN: continue Lisinopril     BPH: Proscar and Flomax    Code status: DNR  DVT prophylaxis: SCDs  Care Plan discussed with: patient and daughter Mer Lopes [phone]  Disposition: Home with [facility], next 1-2 days     Hospital Problems  Date Reviewed: 8/8/2017          Codes Class Noted POA    Altered mental state ICD-10-CM: R41.82  ICD-9-CM: 780.97  8/5/2017 Yes        * (Principal)Dysphasia ICD-10-CM: R47.02  ICD-9-CM: 784.59  8/5/2017 Yes        Fall ICD-10-CM: W34. Tiffany Fluke  ICD-9-CM: F425.6  8/5/2017 Yes            Review of Systems:   Denies HA. No chest pain or pressure. No respiratory or GI complaints (other than being hungry)    Vital Signs:    Last 24hrs VS reviewed since prior progress note. Most recent are:  Visit Vitals    BP (!) 150/93    Pulse 89    Temp 98.1 °F (36.7 °C)    Resp 20    Ht 6' (1.829 m)    Wt 82.4 kg (181 lb 9.6 oz)    SpO2 99%    BMI 24.63 kg/m2       Intake/Output Summary (Last 24 hours) at 08/08/17 1339  Last data filed at 08/07/17 1800   Gross per 24 hour   Intake              240 ml   Output                0 ml   Net              240 ml      Physical Examination:         Constitutional:  No acute distress, cooperative, pleasant    ENT:  Oral mucous moist   Resp:  CTA bilaterally. CV:  Regular rhythm, regular rate    GI:  Soft, non distended, +Epigastric tenderness. Normoactive bowel sounds. + BM    Musculoskeletal:  No edema, warm, 2+ pulses throughout    Neurologic:  Moves all extremities.   AAOx3     Skin:  Good turgor, no rashes or ulcers    Data Review:   Review and/or order of clinical lab test  Review and/or order of tests in the radiology section of CPT  Review and/or order of tests in the medicine section of CPT    Labs:     Recent Labs      08/06/17   0619 08/05/17   1717   WBC  7.0  6.9   HGB  11.8*  10.7*   HCT  33.2*  31.1*   PLT  253  231     Recent Labs      08/07/17   0619 08/06/17 0619 08/05/17   1717   NA  139  136  135*   K  4.0  5.4*  3.9   CL  105  102  99   CO2  27  29  30   BUN  13  18  22*   CREA  1.22  1.31*  1.34*   GLU  102*  92  86   CA  9.1  9.3  8.9     Recent Labs 08/05/17   1717   SGOT  42*   ALT  48   AP  132*   TBILI  0.5   TP  6.4   ALB  3.0*   GLOB  3.4     No results for input(s): INR, PTP, APTT in the last 72 hours. No lab exists for component: INREXT, INREXT   No results for input(s): FE, TIBC, PSAT, FERR in the last 72 hours. Lab Results   Component Value Date/Time    Folate 11.2 09/16/2016 11:03 AM      No results for input(s): PH, PCO2, PO2 in the last 72 hours. No results for input(s): CPK, CKNDX, TROIQ in the last 72 hours.     No lab exists for component: CPKMB  Lab Results   Component Value Date/Time    Cholesterol, total 197 04/05/2017 03:43 PM    HDL Cholesterol 40 04/05/2017 03:43 PM    LDL, calculated 154 09/16/2016 11:03 AM    Triglyceride 124 09/16/2016 11:03 AM    CHOL/HDL Ratio 4.5 10/27/2010 07:50 AM     Lab Results   Component Value Date/Time    Glucose (POC) 118 08/02/2017 08:19 AM     Lab Results   Component Value Date/Time    Color YELLOW/STRAW 08/05/2017 06:23 PM    Appearance CLEAR 08/05/2017 06:23 PM    Specific gravity 1.018 08/05/2017 06:23 PM    pH (UA) 6.0 08/05/2017 06:23 PM    Protein NEGATIVE  08/05/2017 06:23 PM    Glucose NEGATIVE  08/05/2017 06:23 PM    Ketone NEGATIVE  08/05/2017 06:23 PM    Bilirubin NEGATIVE  08/05/2017 06:23 PM    Urobilinogen 2.0 08/05/2017 06:23 PM    Nitrites NEGATIVE  08/05/2017 06:23 PM    Leukocyte Esterase NEGATIVE  08/05/2017 06:23 PM    Epithelial cells FEW 06/06/2016 07:42 AM    Bacteria NEGATIVE  06/06/2016 07:42 AM    WBC 0-4 06/06/2016 07:42 AM    RBC 0-5 06/06/2016 07:42 AM     Medications Reviewed:     Current Facility-Administered Medications   Medication Dose Route Frequency    0.9% sodium chloride infusion  50 mL/hr IntraVENous CONTINUOUS    sodium chloride (NS) flush 5-10 mL  5-10 mL IntraVENous Q8H    sodium chloride (NS) flush 5-10 mL  5-10 mL IntraVENous PRN    fentaNYL citrate (PF) injection 200 mcg  200 mcg IntraVENous Multiple    naloxone (NARCAN) injection 0.4 mg  0.4 mg IntraVENous Multiple    flumazenil (ROMAZICON) 0.1 mg/mL injection 0.2 mg  0.2 mg IntraVENous Multiple    simethicone (MYLICON) 21PY/0.8EO oral drops 80 mg  1.2 mL Oral Multiple    atropine injection 0.5 mg  0.5 mg IntraVENous ONCE PRN    EPINEPHrine (ADRENALIN) 0.1 mg/mL syringe 1 mg  1 mg Endoscopically ONCE PRN    0.9% sodium chloride infusion  50 mL/hr IntraVENous CONTINUOUS    sodium chloride (NS) flush 5-10 mL  5-10 mL IntraVENous Q8H    sodium chloride (NS) flush 5-10 mL  5-10 mL IntraVENous PRN    midazolam (VERSED) injection 0.25-10 mg  0.25-10 mg IntraVENous Multiple    fentaNYL citrate (PF) injection 200 mcg  200 mcg IntraVENous Multiple    naloxone (NARCAN) injection 0.4 mg  0.4 mg IntraVENous Multiple    flumazenil (ROMAZICON) 0.1 mg/mL injection 0.2 mg  0.2 mg IntraVENous Multiple    finasteride (PROSCAR) tablet 5 mg  5 mg Oral DAILY    gabapentin (NEURONTIN) capsule 800 mg  800 mg Oral TID    pantoprazole (PROTONIX) tablet 40 mg  40 mg Oral BID    polyethylene glycol (MIRALAX) packet 17 g  17 g Oral DAILY    sucralfate (CARAFATE) 100 mg/mL oral suspension 1 g  1 g Oral QID    lisinopril (PRINIVIL, ZESTRIL) tablet 5 mg  5 mg Oral DAILY    tamsulosin (FLOMAX) capsule 0.8 mg  0.8 mg Oral DAILY    cloNIDine HCl (CATAPRES) tablet 0.1 mg  0.1 mg Oral Q6H PRN    sodium chloride (NS) flush 5-10 mL  5-10 mL IntraVENous Q8H    sodium chloride (NS) flush 5-10 mL  5-10 mL IntraVENous PRN    0.9% sodium chloride infusion  75 mL/hr IntraVENous CONTINUOUS     Facility-Administered Medications Ordered in Other Encounters   Medication Dose Route Frequency    lidocaine (PF) (XYLOCAINE) 20 mg/mL (2 %) injection   IntraVENous PRN    propofol (DIPRIVAN) 10 mg/mL injection   IntraVENous PRN    [START ON 8/9/2017] 0.9% sodium chloride infusion  25 mL/hr IntraVENous CONTINUOUS    [START ON 8/9/2017] palonosetron HCl (ALOXI) injection 0.25 mg  0.25 mg IntraVENous ONCE    [START ON 8/9/2017] dexamethasone (DECADRON) 4 mg/mL injection 12 mg  12 mg IntraVENous ONCE    [START ON 8/9/2017] famotidine (PF) (PEPCID) 20 mg in sodium chloride 0.9 % 10 mL injection  20 mg IntraVENous ONCE    [START ON 8/9/2017] diphenhydrAMINE (BENADRYL) injection 25 mg  25 mg IntraVENous ONCE    [START ON 8/9/2017] PACLitaxel (TAXOL) 100 mg in 0.9% sodium chloride 250 mL, overfill volume 25 mL chemo infusion  100 mg IntraVENous ONCE    [START ON 8/9/2017] CARBOplatin (PARAPLATIN) 125 mg in 0.9% sodium chloride 250 mL, overfill volume 25 mL chemo infusion  125 mg IntraVENous ONCE   ______________________________________________________________________  EXPECTED LENGTH OF STAY: 2d 14h  ACTUAL LENGTH OF STAY:          [address] [facility], NP

## 2017-08-08 NOTE — ROUTINE PROCESS
TRANSFER - OUT REPORT:    Verbal report given to Ramona Auguste RN on Darwin Archera Sr.  being transferred to [facility] for change in patient condition(moved to tele)report consisted of patients Situation, Background, Assessment and   Recommendations(SBAR). Information from the following report(s) SBAR was reviewed with the receiving nurse. Lines:   Venous Access Device 08/02/17 Other (comment) (Active)   Central Line Being Utilized No 8/7/2017  3:28 PM   Action Taken Other (comment) 8/7/2017  3:28 PM       Peripheral IV 08/08/17 Right Wrist (Active)   Site Assessment Clean, dry, & intact 8/8/2017  4:11 PM   Phlebitis Assessment 0 8/8/2017  4:11 PM   Infiltration Assessment 0 8/8/2017  4:11 PM   Dressing Status Clean, dry, & intact 8/8/2017  4:11 PM   Dressing Type Transparent 8/8/2017  4:11 PM   Hub Color/Line Status Capped 8/8/2017  4:11 PM   Alcohol Cap Used Yes 8/8/2017  4:11 PM        Opportunity for questions and clarification was provided.       Patient transported with:   [email or web address]

## 2017-08-08 NOTE — PROGRESS NOTES
Pt still denies pain at this time. Pt remains on oxygen.  Changed from non-rebreather to NC at 3 liters

## 2017-08-09 NOTE — PROGRESS NOTES
Interdepartmental Hand-off Summary    Situation:     8/9/2017  1540 PM    Transferred from: [facility] 1 room 659  Transferred to: [address] [place] [address] room 608  Reason for transfer: step down care     Attending physician: Aisha Pillai MD    Patient assessment:      No Known Allergies      Transferring nurse: Lisbeth Courtney RN, 5:22 PM    Receiving nurse: Rina Rangel RN

## 2017-08-09 NOTE — PROGRESS NOTES
Bedside shift change report given to Dayanara Perez RN (oncoming nurse) by Geena De Luna RN (offgoing nurse). Report included the following information SBAR, Kardex, ED Summary, Procedure Summary, Intake/Output, MAR, Accordion and Cardiac Rhythm NSR.

## 2017-08-09 NOTE — PROGRESS NOTES
TRANSFER - IN REPORT:    Verbal report received from Karen(name) on [employer] Sr.  being received from (unit) for routine progression of care      Report consisted of patients Situation, Background, Assessment and   Recommendations(SBAR). Information from the following report(s) SBAR and Kardex was reviewed with the receiving nurse. Opportunity for questions and clarification was provided. Assessment completed upon patients arrival to unit and care assumed.

## 2017-08-09 NOTE — PROGRESS NOTES
[address]  [address] [place], [address]  [phone]                GI PROGRESS NOTE  JEAN-PAUL KenneyP-BC  Work Cell: [phone]      NAME:   Adrian Ann Sr.   :    1931   MRN:    [id number]     Assessment/Plan   1. 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 Had a cough, tachycardia and increase in oxygen needs post procedure. Chest Ray consistent with pneumonia. - Supportive management per primary team  - Clear liquids as tolerated   - Speech consult for recommendations regarding dietary modifications   - Continue antibiotics for now  - Okay to start TF via PEG per nutrition recommendations      Patient Active Problem List   Diagnosis Code    Left knee DJD M17.12    HTN (hypertension) I10    Hypercholesterolemia E78.00    Urinary frequency R35.0    Insomnia G47.00    Kidney function abnormal N28.9    Elevated PSA measurement R97.20    B12 deficiency E53.8    Vitamin D deficiency E55.9    AR (allergic rhinitis) J30.9    Acute bronchitis J20.9    Special screening for osteoporosis Z13.820    Tingling sensation R20.2    Hip pain, left M25.552    Back pain, chronic M54.9, G89.29    Need for varicella vaccine Z23    Chronic constipation K59.09    Rash of entire body R21    DNR (do not resuscitate) Z66    Decreased hearing of both ears H91.93    Encounter for Hemoccult screening Z12.11    Gastroesophageal reflux disease without esophagitis K21.9    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 XXXA       Subjective:     Feeling well. Reports some mild soreness around PEG tube site. States abdominal binder is too tight. Tolerating clears without nausea or vomiting. Passing flatus and states had large loose stools yesterday.  Per nursing, stools are more soft today. Review of Systems    Constitutional: negative fever, negative chills, negative weight loss  Eyes:   negative visual changes  ENT:   negative sore throat, tongue or lip swelling  Respiratory:  negative cough, negative dyspnea  Cards:  negative for chest pain, palpitations, lower extremity edema  GI:   See HPI  :  negative for frequency, dysuria  Integument:  negative for rash and pruritus  Heme:  negative for easy bruising and gum/nose bleeding  Musculoskel: negative for myalgias, back pain and muscle weakness  Neuro: negative for headaches, dizziness, vertigo  Psych:  negative for feelings of anxiety, depression     Objective:     VITALS:   Last 24hrs VS reviewed since prior hospitalist progress note. Most recent are:  Visit Vitals    BP (!) 116/91    Pulse 93    Temp 98 °F (36.7 °C)    Resp 16    Ht 6' (1.829 m)    Wt 80.7 kg (177 lb 14.6 oz)    SpO2 99%    BMI 24.13 kg/m2       Intake/Output Summary (Last 24 hours) at 08/09/17 1120  Last data filed at 08/08/17 1834   Gross per 24 hour   Intake              500 ml   Output                0 ml   Net              500 ml        PHYSICAL EXAM:  General   well developed, thin, elderly, alert, in no acute distress  EENT  Normocephalic, Atraumatic, PERRLA, EOMI, sclera clear  Respiratory   Clear To Auscultation bilaterally - no wheezes, rales, rhonchi, or crackles  Cardiology  Regular Rate and Rythmn  - no murmurs, rubs or gallops  Abdominal  Soft, non-tender, non-distended, PEG tube in LUQ, positive bowel sounds, no hepatosplenomegaly, no palpable mass  Extremities  No clubbing, cyanosis, or edema. Pulses intact. Neurological  No focal neurological deficits noted  Psychological  Oriented x 3. Normal affect.      Lab Data   Recent Results (from the past 12 hour(s))   METABOLIC PANEL, BASIC    Collection Time: 08/09/17  3:50 AM   Result Value Ref Range    Sodium 140 136 - 145 mmol/L    Potassium 4.2 3.5 - 5.1 mmol/L    Chloride 107 97 - 108 mmol/L    CO2 23 21 - 32 mmol/L    Anion gap 10 5 - 15 mmol/L    Glucose 104 (H) 65 - 100 mg/dL    BUN 30 (H) 6 - 20 MG/DL    Creatinine 1.56 (H) 0.70 - 1.30 MG/DL    BUN/Creatinine ratio 19 12 - 20      GFR est AA 51 (L) >60 ml/min/1.73m2    GFR est non-AA 42 (L) >60 ml/min/1.73m2    Calcium 8.7 8.5 - 10.1 MG/DL   CBC WITH AUTOMATED DIFF    Collection Time: 08/09/17  3:50 AM   Result Value Ref Range    WBC 11.5 (H) 4.1 - 11.1 K/uL    RBC 3.55 (L) 4.10 - 5.70 M/uL    HGB 11.5 (L) 12.1 - 17.0 g/dL    HCT 32.8 (L) 36.6 - 50.3 %    MCV 92.4 80.0 - 99.0 FL    MCH 32.4 26.0 - 34.0 PG    MCHC 35.1 30.0 - 36.5 g/dL    RDW 12.1 11.5 - 14.5 %    PLATELET 379 593 - 769 K/uL    NEUTROPHILS 63 32 - 75 %    BAND NEUTROPHILS 30 (H) 0 - 6 %    LYMPHOCYTES 2 (L) 12 - 49 %    MONOCYTES 5 5 - 13 %    EOSINOPHILS 0 0 - 7 %    BASOPHILS 0 0 - 1 %    ABS. NEUTROPHILS 10.7 (H) 1.8 - 8.0 K/UL    ABS. LYMPHOCYTES 0.2 (L) 0.8 - 3.5 K/UL    ABS. MONOCYTES 0.6 0.0 - 1.0 K/UL    ABS. EOSINOPHILS 0.0 0.0 - 0.4 K/UL    ABS. BASOPHILS 0.0 0.0 - 0.1 K/UL    DF MANUAL      RBC COMMENTS NORMOCYTIC, NORMOCHROMIC           Medications: Reviewed    PMH/SH reviewed - no change compared to H&P  Mid-Level Provider: Angely Gupta NP   Date/Time:  8/9/2017  I have examined the patient. I have reviewed the chart and agree with the documentation recorded by the NP, including the assessment, treatment plan, and disposition.       Otto Ortiz MD

## 2017-08-09 NOTE — PROGRESS NOTES
Received consult for [facility] for supplies and feedings for new PEG tube, placed yesterday. No other skilled [facility] needs noted. Met with pt at the bedside and discussed role of CM. He expects his daughter to arrive later today to also participate in the new PEG tube education. Offered choice and referral sent to [facility].      Pt is expecting discharge home tomorrow with new PEG tube and [facility] services and assistance from daughter. Jennifer Bowers, MSW    3pm  Received call back from [facility] representative and was advised by Emily Solitario that [facility] Buddy is out of network with pt's [facility] Medicare policy. Cathleen Patricia are in network. Referral sent to [address] via [employer] and alerted hospital rep, Wilder Juarez of new referral and planned discharge tomorrow.     Lawson Montoya, MSW

## 2017-08-09 NOTE — PROGRESS NOTES
Hospitalist Progress Note  Cece Freedman NP  Office: [phone]  Phone [phone]     Date of Service:  2017  NAME:  Bekah Chau Sr.  :  1931  MRN:  [id number]    Admission Summary:   Pt presented to the ED for altered mental status and inability to tolerated solid foods. ~ Five days PTA, an esophageal stent was placed by Dr. Tessy Maxwell then ~ 2 days PTA, a port was placed in anticipation for chemo. At the time of port placement, it was noticed that the esophageal stent may have migrated. Plans were made to remove the stent and place a PEG but then the pt started having dizziness and low blood pressure. She contacted his PCP who advised him to stop taking his lisinopril. He then fell and continued to have dizziness and have signs of confusion so called EMS. Interval history / Subjective:   Pt in bed, no new complaints - VSS. Assessment & Plan:     Suspected Aspiration during EGD/PEG :  - pts BP and HR elevated with increased oxygen requirement  - bolused in Endo and started on Zosyn yesterday  - transferred to tele for increased HR and closer monitoring  - today, VSS: HR is controlled - can go back to medical  - Chest Xray: New bilateral perihilar airspace disease may represent pulmonary  edema or pneumonia. Small bilateral pleural effusions  - continue with zosyn for now    Odynophagia,  likely related to Esophageal cancer:  - Dr. Renetta Callahan follows pt for his esophageal cancer - he is to start chemo next week (sent message to update her on recent events)  - CXR : esophageal stent migrated into the fundus of the stomach  - : removal of stent and PEG placed  - still ok to start feeding today?  Will discuss with GI  - daughter will be performing tube care - needs to be present for education    CKD 3: stable    Mild Hyperkalemia: resolved    S/p Fall, PTA: no PT needs on d/c    Hx HTN: continue Lisinopril     BPH: Proscar and Flomax    Code status: DNR  DVT prophylaxis: SCDs  Care Plan discussed with: patient and daughter Carlos Manuel Schulz) [phone] (spoke with daughter late yesterday after Endo and again this am)  Disposition: Home soon, possibly tomorrow, will need HH for PEG. Hospital Problems  Date Reviewed: 8/8/2017          Codes Class Noted POA    Altered mental state ICD-10-CM: R41.82  ICD-9-CM: 780.97  8/5/2017 Yes        * (Principal)Dysphasia ICD-10-CM: R47.02  ICD-9-CM: 784.59  8/5/2017 Yes        Fall ICD-10-CM: W54. Dirk Lawman  ICD-9-CM: J144.8  8/5/2017 Yes            Review of Systems:   Denies HA. No chest pain or pressure. No respiratory or GI complaints. Vital Signs:    Last 24hrs VS reviewed since prior progress note. Most recent are:  Visit Vitals    BP (!) 116/91    Pulse 93    Temp 98 °F (36.7 °C)    Resp 16    Ht 6' (1.829 m)    Wt 80.7 kg (177 lb 14.6 oz)    SpO2 99%    BMI 24.13 kg/m2       Intake/Output Summary (Last 24 hours) at 08/09/17 1031  Last data filed at 08/08/17 1834   Gross per 24 hour   Intake              500 ml   Output                0 ml   Net              500 ml      Physical Examination:         Constitutional:  No acute distress, cooperative, pleasant    ENT:  Oral mucous moist   Resp:  Coarse breath sounds in bilateral bases. On 2L NC, weaned to RA during assessment   CV:  Regular rhythm, regular rate    GI:  Soft, non distended, abdominal binder on, PEG. Normoactive bowel sounds. + BM    Musculoskeletal:  No edema, warm, 2+ pulses throughout    Neurologic:  Moves all extremities. AAOx3.      Skin:  Good turgor, no rashes or ulcers    Data Review:   Review and/or order of clinical lab test  Review and/or order of tests in the radiology section of CPT  Review and/or order of tests in the medicine section of CPT    Labs:     Recent Labs      08/09/17   0350  08/08/17   1615   WBC  11.5*  1.8*   HGB  11.5*  13.3   HCT  32.8*  37.8   PLT  207  188     Recent Labs      08/09/17   0350  08/08/17   1615 08/07/17   0619   NA  140  141  139   K  4.2  4.0  4.0   CL  107  105  105   CO2  23  25  27   BUN  30*  15  13   CREA  1.56*  1.41*  1.22   GLU  104*  139*  102*   CA  8.7  9.4  9.1     Recent Labs      08/08/17   1615   SGOT  37   ALT  53   AP  146*   TBILI  0.9   TP  6.8   ALB  3.2*   GLOB  3.6     No results for input(s): INR, PTP, APTT in the last 72 hours. No lab exists for component: INREXT, INREXT   No results for input(s): FE, TIBC, PSAT, FERR in the last 72 hours. Lab Results   Component Value Date/Time    Folate 11.2 09/16/2016 11:03 AM      No results for input(s): PH, PCO2, PO2 in the last 72 hours. No results for input(s): CPK, CKNDX, TROIQ in the last 72 hours.     No lab exists for component: CPKMB  Lab Results   Component Value Date/Time    Cholesterol, total 197 04/05/2017 03:43 PM    HDL Cholesterol 40 04/05/2017 03:43 PM    LDL, calculated 154 09/16/2016 11:03 AM    Triglyceride 124 09/16/2016 11:03 AM    CHOL/HDL Ratio 4.5 10/27/2010 07:50 AM     Lab Results   Component Value Date/Time    Glucose (POC) 118 08/02/2017 08:19 AM     Lab Results   Component Value Date/Time    Color YELLOW/STRAW 08/05/2017 06:23 PM    Appearance CLEAR 08/05/2017 06:23 PM    Specific gravity 1.018 08/05/2017 06:23 PM    pH (UA) 6.0 08/05/2017 06:23 PM    Protein NEGATIVE  08/05/2017 06:23 PM    Glucose NEGATIVE  08/05/2017 06:23 PM    Ketone NEGATIVE  08/05/2017 06:23 PM    Bilirubin NEGATIVE  08/05/2017 06:23 PM    Urobilinogen 2.0 08/05/2017 06:23 PM    Nitrites NEGATIVE  08/05/2017 06:23 PM    Leukocyte Esterase NEGATIVE  08/05/2017 06:23 PM    Epithelial cells FEW 06/06/2016 07:42 AM    Bacteria NEGATIVE  06/06/2016 07:42 AM    WBC 0-4 06/06/2016 07:42 AM    RBC 0-5 06/06/2016 07:42 AM     Medications Reviewed:     Current Facility-Administered Medications   Medication Dose Route Frequency    piperacillin-tazobactam (ZOSYN) 3.375 g in 0.9% sodium chloride (MBP/ADV) 100 mL  3.375 g IntraVENous Q8H    haloperidol lactate (HALDOL) injection 1 mg  1 mg IntraVENous Q8H PRN    acetaminophen (TYLENOL) solution 650 mg  650 mg Oral Q4H PRN    ondansetron (ZOFRAN) injection 4 mg  4 mg IntraVENous Q4H PRN    fentaNYL citrate (PF) injection 25 mcg  25 mcg IntraVENous Q4H PRN    finasteride (PROSCAR) tablet 5 mg  5 mg Oral DAILY    gabapentin (NEURONTIN) capsule 800 mg  800 mg Oral TID    pantoprazole (PROTONIX) tablet 40 mg  40 mg Oral BID    polyethylene glycol (MIRALAX) packet 17 g  17 g Oral DAILY    sucralfate (CARAFATE) 100 mg/mL oral suspension 1 g  1 g Oral QID    lisinopril (PRINIVIL, ZESTRIL) tablet 5 mg  5 mg Oral DAILY    tamsulosin (FLOMAX) capsule 0.8 mg  0.8 mg Oral DAILY    cloNIDine HCl (CATAPRES) tablet 0.1 mg  0.1 mg Oral Q6H PRN    sodium chloride (NS) flush 5-10 mL  5-10 mL IntraVENous Q8H    sodium chloride (NS) flush 5-10 mL  5-10 mL IntraVENous PRN    0.9% sodium chloride infusion  75 mL/hr IntraVENous CONTINUOUS     Facility-Administered Medications Ordered in Other Encounters   Medication Dose Route Frequency    0.9% sodium chloride infusion  25 mL/hr IntraVENous CONTINUOUS    palonosetron HCl (ALOXI) injection 0.25 mg  0.25 mg IntraVENous ONCE    dexamethasone (DECADRON) 4 mg/mL injection 12 mg  12 mg IntraVENous ONCE    famotidine (PF) (PEPCID) 20 mg in sodium chloride 0.9 % 10 mL injection  20 mg IntraVENous ONCE    diphenhydrAMINE (BENADRYL) injection 25 mg  25 mg IntraVENous ONCE    PACLitaxel (TAXOL) 100 mg in 0.9% sodium chloride 250 mL, overfill volume 25 mL chemo infusion  100 mg IntraVENous ONCE    CARBOplatin (PARAPLATIN) 125 mg in 0.9% sodium chloride 250 mL, overfill volume 25 mL chemo infusion  125 mg IntraVENous ONCE   ______________________________________________________________________  EXPECTED LENGTH OF STAY: 2d 14h  ACTUAL LENGTH OF STAY:          [address], NP

## 2017-08-09 NOTE — PROGRESS NOTES
IDR/SLIDR Summary          Patient: Laurie Soler Sr. MRN: [id number]    Age: 80 y.o. YOB: 1931 Room/Bed: [facility]   Admit Diagnosis: Altered mental state  Fall  Dysphasia  Dysphagia  Principal Diagnosis: Dysphasia   Goals: Free form falls, management of heart rate, management of pain   Readmission: NO  Quality Measure: COPD  VTE Prophylaxis: Mechanical  Influenza Vaccine screening completed? YES  Pneumococcal Vaccine screening completed? YES  Mobility needs: Yes   Nutrition plan:Yes  Consults:P.T, O.T., Respiratory and Case Management    Financial concerns:No  Escalated to CM? YES  RRAT Score: 13   Interventions:Home Health  Testing due for pt today?  NO  LOS: 4 days Expected length of stay TBD days  Discharge plan: Home   PCP: Kushal Bermudez MD  Transportation needs: No    Days before discharge:two or more days before discharge   Discharge disposition: Home    Signed:     Yrn Vargas RN  8/9/2017  11:40 AM

## 2017-08-10 NOTE — DISCHARGE INSTRUCTIONS
Discharge Instructions     PATIENT ID: Princess Lopez Sr. MRN: [id number]   YOB: 1931    DATE OF ADMISSION: 8/5/2017  4:55 PM    DATE OF DISCHARGE: 8/11/2017  PRIMARY CARE PROVIDER: Clarita Perez MD   ATTENDING PHYSICIAN: Mika Mayorga MD  DISCHARGING PROVIDER: Aniket Gillis NP. To contact this individual call [phone] and ask the  to page. If unavailable ask to be transferred the Adult Hospitalist Department. DISCHARGE DIAGNOSES   Suspected Aspiration during EGD/PEG 8/8  Odynophagia, likely related to Esophageal cancer    CONSULTATIONS: IP CONSULT TO HOSPITALIST  IP CONSULT TO GASTROENTEROLOGY    PROCEDURES/SURGERIES: Procedure(s):  ESOPHAGOGASTRODUODENOSCOPY (EGD)   PERCUTANEOUS ENDOSCOPIC GASTROSTOMY TUBE INSERTION  ENDOSCOPY WITH PROSTHESIS OR STENT REMOVAL    FOLLOW UP APPOINTMENTS:   Follow-up Information     Follow up With Details Comments Contact Info    Ramos Steiner On 8/11/2017 new PEG tube supplies [address]    Clarita Perez MD  As needed Jessica SILVA. 66.  [phone]      Dalton Ferrell MD  next week for chemo as previously scheduled [facility]  [facility]      Jerzy Sutton MD  every 2-4 weeks [address] [facility]  [address] [facility]  [phone]          ADDITIONAL CARE RECOMMENDATIONS:   - hold off on any laxatives or stool softeners  - PPI has been adjusted to once daily    DIET: Mechanical Soft  Two Rubens HN, 240 mL 5x/day (0600, 0900, 1200, 1500, 1800) + 100 mL flush before and after each feeding    ACTIVITY: Activity as tolerated    EQUIPMENT needed: as per [facility] for tubefeeding    · It is important that you take the medication exactly as they are prescribed. · Keep your medication in the bottles provided by the pharmacist and keep a list of the medication names, dosages, and times to be taken in your wallet. · Do not take other medications without consulting your doctor. NOTIFY YOUR PHYSICIAN FOR ANY OF THE FOLLOWING:   Fever over 101 degrees for 24 hours. Chest pain, shortness of breath, fever, chills, nausea, vomiting, diarrhea, change in mentation, falling, weakness, bleeding. Severe pain or pain not relieved by medications. Or, any other signs or symptoms that you may have questions about.     DISPOSITION:   xx Home With:   OT  PT xx HH xx RN       SNF/Inpatient Rehab/LTAC    Independent/assisted living    Hospice    Other:     CDMP Checked:   Yes *x*     PROBLEM LIST Updated:  Yes *x*     Signed:   Abelardo Bolnad NP  8/11/2017  4:40 PM

## 2017-08-10 NOTE — PROGRESS NOTES
10:50 AM  Patient reviewed in rounds. Patient expected to be discharged today home with home health for tube feeding formula and supplies. Patient is set up with [facility] for services. CM spoke with Angel Guy, dietician with [facility] [phone]. CM was informed they are awaiting for discharge orders for feedings. CM will submit updated orders once received. Family requesting for [facility] to provide teaching at home rather than in hospital.  CM to notify [facility] of patient's potential discharge time. CM awaiting discharge orders at this time from attending. CM will continue to follow and assist with disposition needs as they arise. 11:52 AM  CM received updated Dietician progress note and submitted to [facility] via [employer] for review. CM will continue to follow and assist with disposition needs as they arise. 2:39 PM  CM informed that patient will be discharged tomorrow. CM provided update to Mendel Dobbs [phone] who will be following patient in the community for services. [facility] to follow-up with patient's daughter to confirm a time for home visit to set up tube feeding formula and supplies. Mode of transport at time of discharge to be provided by patient's daughter. CM will continue to follow and assist with disposition needs as they arise.     TAM Aguirre/RENATA

## 2017-08-10 NOTE — PROGRESS NOTES
Speech Pathology bedside swallow evaluation/discharge  Patient: Bekah Chau Sr. (80 y.o. male)  Date: 8/10/2017  Primary Diagnosis: Altered mental state  Fall  Dysphasia  Dysphagia  Procedure(s) (LRB):  ESOPHAGOGASTRODUODENOSCOPY (EGD)  (N/A)  PERCUTANEOUS ENDOSCOPIC GASTROSTOMY TUBE INSERTION (N/A)  ENDOSCOPY WITH PROSTHESIS OR STENT REMOVAL (N/A) 2 Days Post-Op   Precautions: swallow      ASSESSMENT :  Based on the objective data described below, the patient presents with functional oropharyngeal swallow. No overt s/s of aspiration noted with any consistencies. 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 Patient demonstrated understanding with teachback. Skilled therapy provided by a speech-language pathologist is not indicated at this time. PLAN :  Recommendations:  1. Recommend mech soft diet. Discussed need to avoid particulates (i.e. Rice, nuts, seeds)  2. Alternate liquids/solids  3. Slow rate; chew well  4. NO straws    Discharge Recommendations: None     SUBJECTIVE:   Patient stated \"Yeah, I'm going to have to avoid those things\" in regards to apple slices. NAD.      OBJECTIVE:     Past Medical History:   Diagnosis Date    Acid reflux     Arthritis     right  left knees    Back pain, chronic 4/11/2014    Cancer ([facility] [address] 2017    esophegeal    Decreased hearing of both ears 4/5/2017    DNR (do not resuscitate) 4/5/2017    Gastroesophageal reflux disease without esophagitis 6/13/2017    GERD (gastroesophageal reflux disease)     Hip pain, left 4/11/2014    Hypercholesterolemia     Hypertension     Need for varicella vaccine 4/11/2014    Rash of entire body 9/15/2016    Restless leg syndrome      Past Surgical History:   Procedure Laterality Date    ABDOMEN SURGERY PROC UNLISTED  1980's    gall bladder    ABDOMEN SURGERY PROC UNLISTED  15 yrs ago    right hernia repair    HX HEENT EYE SURGERY 40 YRS AGO.  HX ORTHOPAEDIC      fluid removed both knees    HX ORTHOPAEDIC  2010    bilateral knee replacements     Prior Level of Function/Home Situation:   Home Situation  Home Environment: Private residence  # Steps to Enter: 0  One/Two Story Residence: One story  Living Alone: No  Support Systems: Family member(s)  Patient Expects to be Discharged to[de-identified] Private residence  Current DME Used/Available at Home: Cane, straight     Diet prior to admission: Regular diet/thin liquids  Current Diet:  Clear liquids    Cognitive and Communication Status:  Neurologic State: Alert  Orientation Level: Oriented X4  Cognition: Appropriate decision making, Follows commands  Perception: Appears intact     Safety/Judgement: Awareness of environment, Insight into deficits  Oral Assessment:  Oral Assessment  Labial: No impairment  Dentition: Limited;Natural;Extractions  Oral Hygiene: dry oral mucosa  Lingual: No impairment  Velum: No impairment  Mandible: No impairment  P.O. Trials:  Patient Position: Upright in bed  Vocal quality prior to P.O.: No impairment  Consistency Presented: Puree; Thin liquid  How Presented: Self-fed/presented;Cup/sip;Spoon     Bolus Acceptance: No impairment  Bolus Formation/Control: No impairment        Oral Residue: None  Initiation of Swallow: No impairment     Aspiration Signs/Symptoms: None  Pharyngeal Phase Characteristics: No impairment, issues, or problems   Effective Modifications: Alternate liquids/solids  Cues for Modifications: Minimal       Oral Phase Severity: No impairment  Pharyngeal Phase Severity : No impairment  NOMS:   The NOMS functional outcome measure was used to quantify this patient's level of swallowing impairment. Based on the NOMS, the patient was determined to be at level 6 for swallow function     G Codes:   In compliance with CMSs Claims Based Outcome Reporting, the following G-code set was chosen for this patient based the use of the NOMS functional outcome to quantify this patient's level of swallowing impairment. Using the NOMS, the patient was determined to be at level 6 for swallow function which correlates with the CI= 1-19% level of severity. Based on the objective assessment provided within this note, the current, goal, and discharge g-codes are as follows:    Swallow  Swallowing:   Swallow Current Status CI= 1-19%   Swallow Goal Status CI= 1-19%   Swallow D/C Status CI= 1-19%        NOMS Swallowing Levels:  Level 1 (CN): NPO  Level 2 (CM): NPO but takes consistency in therapy  Level 3 (CL): Takes less than 50% of nutrition p.o. and continues with nonoral feedings; and/or safe with mod cues; and/or max diet restriction  Level 4 (CK): Safe swallow but needs mod cues; and/or mod diet restriction; and/or still requires some nonoral feeding/supplements  Level 5 (CJ): Safe swallow with min diet restriction; and/or needs min cues  Level 6 (CI): Independent with p.o.; rare cues; usually self cues; may need to avoid some foods or needs extra time  Level 7 ([address] [place] [address]): Independent for all p.o.  DIOMEDES. (2003). National Outcomes Measurement System (NOMS): Adult Speech-Language Pathology User's Guide. Pain:Pain Scale 1: Numeric (0 - 10)  Pain Intensity 1: 0     After treatment:   [] Patient left in no apparent distress sitting up in chair  [x] Patient left in no apparent distress in bed  [x] Call bell left within reach  [x] Nursing notified  [] Caregiver present  [] Bed alarm activated    COMMUNICATION/EDUCATION:   The patients plan of care including findings, recommendations, and recommended diet changes were discussed with: Registered Nurse. [x] Posted safety precautions in patient's room. [x] Patient/family have participated as able and agree with findings and recommendations. [] Patient is unable to participate in plan of care at this time. Thank you for this referral.  Kimberly Greene.  Pj Cook MS, CCC-SLP, BCS-S  Time Calculation: 52 mins

## 2017-08-10 NOTE — PROGRESS NOTES
NUTRITION       Recommendations:  1. Would continue with goal tube feeding regimen. No longer need to progress over two days as he received a large bolus at 0600 and tolerated well overall. Next feeding will be ~1200 today. GOAL TUBE FEEDING:   Two Rubens HN, 240 mL 5x/day (0600, 0900, 1200, 1500, 1800) + 100 mL flush before and after each    Follow up. Chart reviewed, discussed with RN and team during interdisciplinary rounds. Tube feeding orders placed yesterday morning, but feedings were not started until this morning ~0600. Per documentation, formula volume was 400 mL and flush was 200 mL. Pt admits to feeling really full afterwards, but he has no complaints at this time. Would recommend continuing with goal volume of 240 mL (1 can) per feeding and forgo progressing to larger volume. Explained feeding regimen to the pt, but will try and return when daughter is available to discuss plan for home. Above goal feedings to provide 2375 kcal, 100 g protein in 2080 mL total free water (tf + flush)-- meeting 96% and 100% of estimated kcal and protein needs, respectively. Estimated Nutrition Needs:   Kcals/day: 7106 Kcals/day (8160-5191 kcal/day (30-32 kcal/kg))  Protein: 99 g (1.2 g/kg -- adj per renal function)  Fluid: 2472 ml (1 mL/kcal)     Based On: Kcal/kg - specify (Comment)  Weight Used: Actual wt (82.4 kg)     RD to follow.     [address]

## 2017-08-10 NOTE — PROGRESS NOTES
[address]  [address] [place], [address]  [phone]                GI PROGRESS NOTE  JEAN-PAUL DohertyP-BC  Work Cell: [phone]      NAME:   Bekah Chau Sr.   :    1931   MRN:    [id number]     Assessment/Plan   1. 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 Had a cough, tachycardia and increase in oxygen needs post procedure. Chest x-ray consistent with pneumonia. - Supportive management per primary team  - Continue antibiotics  - Mechanical soft diet per speech recommendations   - TF via PEG per nutrition recommendations      Patient Active Problem List   Diagnosis Code    Left knee DJD M17.12    HTN (hypertension) I10    Hypercholesterolemia E78.00    Urinary frequency R35.0    Insomnia G47.00    Kidney function abnormal N28.9    Elevated PSA measurement R97.20    B12 deficiency E53.8    Vitamin D deficiency E55.9    AR (allergic rhinitis) J30.9    Acute bronchitis J20.9    Special screening for osteoporosis Z13.820    Tingling sensation R20.2    Hip pain, left M25.552    Back pain, chronic M54.9, G89.29    Need for varicella vaccine Z23    Chronic constipation K59.09    Rash of entire body R21    DNR (do not resuscitate) Z66    Decreased hearing of both ears H91.93    Encounter for Hemoccult screening Z12.11    Gastroesophageal reflux disease without esophagitis K21.9    GE junction carcinoma ([facility] [facility] C16.0    Esophageal dysphagia R13.14    Pain R52    Altered mental state R41.82    Dysphasia R47.02    Fall W19. XXXA       Subjective:     Feeling better today. Had some abdominal pain and nausea last night. Thinks it was because he ate too much.        Review of Systems    Constitutional: negative fever, negative chills, negative weight loss  Eyes:   negative visual changes  ENT:   negative sore throat, tongue or lip swelling  Respiratory: negative cough, negative dyspnea  Cards:  negative for chest pain, palpitations, lower extremity edema  GI:   See HPI  :  negative for frequency, dysuria  Integument:  negative for rash and pruritus  Heme:  negative for easy bruising and gum/nose bleeding  Musculoskel: negative for myalgias, back pain and muscle weakness  Neuro: negative for headaches, dizziness, vertigo  Psych:  negative for feelings of anxiety, depression     Objective:     VITALS:   Last 24hrs VS reviewed since prior hospitalist progress note. Most recent are:  Visit Vitals    /64 (BP 1 Location: Left arm, BP Patient Position: At rest)    Pulse 92    Temp 98.5 °F (36.9 °C)    Resp 18    Ht 6' (1.829 m)    Wt 80.7 kg (177 lb 14.6 oz)    SpO2 91%    BMI 24.13 kg/m2       Intake/Output Summary (Last 24 hours) at 08/10/17 1221  Last data filed at 08/10/17 0636   Gross per 24 hour   Intake              570 ml   Output                0 ml   Net              570 ml        PHYSICAL EXAM:  General   well developed, thin, elderly, alert, in no acute distress  EENT  Normocephalic, Atraumatic, PERRLA, EOMI, sclera clear  Respiratory   Clear To Auscultation bilaterally - no wheezes, rales, rhonchi, or crackles  Cardiology  Regular Rate and Rythmn  - no murmurs, rubs or gallops  Abdominal  Soft, non-tender, non-distended, PEG tube in LUQ, positive bowel sounds, no hepatosplenomegaly, no palpable mass  Extremities  No clubbing, cyanosis, or edema. Pulses intact. Neurological  No focal neurological deficits noted  Psychological  Oriented x 3.  Normal affect.        Lab Data   Recent Results (from the past 12 hour(s))   CBC W/O DIFF    Collection Time: 08/10/17  6:38 AM   Result Value Ref Range    WBC 16.4 (H) 4.1 - 11.1 K/uL    RBC 3.18 (L) 4.10 - 5.70 M/uL    HGB 10.2 (L) 12.1 - 17.0 g/dL    HCT 28.8 (L) 36.6 - 50.3 %    MCV 90.6 80.0 - 99.0 FL    MCH 32.1 26.0 - 34.0 PG    MCHC 35.4 30.0 - 36.5 g/dL    RDW 12.4 11.5 - 14.5 %    PLATELET 683 150 - 986 K/uL   METABOLIC PANEL, BASIC    Collection Time: 08/10/17  6:38 AM   Result Value Ref Range    Sodium 137 136 - 145 mmol/L    Potassium 3.6 3.5 - 5.1 mmol/L    Chloride 105 97 - 108 mmol/L    CO2 23 21 - 32 mmol/L    Anion gap 9 5 - 15 mmol/L    Glucose 101 (H) 65 - 100 mg/dL    BUN 31 (H) 6 - 20 MG/DL    Creatinine 1.63 (H) 0.70 - 1.30 MG/DL    BUN/Creatinine ratio 19 12 - 20      GFR est AA 49 (L) >60 ml/min/1.73m2    GFR est non-AA 40 (L) >60 ml/min/1.73m2    Calcium 8.5 8.5 - 10.1 MG/DL         Medications: Reviewed    PMH/ reviewed - no change compared to H&P  Mid-Level Provider: Cheng Whitmore NP   Date/Time:  8/10/2017   I have examined the patient. I have reviewed the chart and agree with the documentation recorded by the NP, including the assessment, treatment plan, and disposition.       Zachariah North MD

## 2017-08-10 NOTE — PROGRESS NOTES
Bedside shift [facility] by Ori Gill (offgoing nurse). Report included the following information SBAR and Kardex. 2100: Came on shift to find patient's bolus tube feeds had not been started. Orders placed for 1100 on 8/9 on kardex. Assessed patient during rounds to find that patient was complaining of nausea and stomach cramps. Administered IV zofran and will reassess patient and start tube feeds if able to tolerate. 2230: Patient still complaining of nausea and abdominal discomfort. Spoke to patient about tube feeds to which he had no knowledge of, however, I was told during report that patient can be confused at times. Due to patient complaining of stomach pains and nausea, I will hold for now and re-try later on in the morning. 0630: Patient mildly nauseous this morning but stated he thought it could be because he was hungry. Administered IV zofran and asked if he would be willing to attempt tube feed, and he said yes. Started bolus and tolerated well during feeding but about 20 minutes after, patient stated he felt mildly nauseous again. Passed info on to oncoming nurse.

## 2017-08-10 NOTE — PROGRESS NOTES
Bedside shift change report given to [address] [place] [address] [address] (oncoming nurse) by Codey Wells RN (offgoing nurse). Report included the following information SBAR and Kardex. 1915: Patient had a huge loose bowl movement then after we cleaned him up he tried to make it to the bathroom but couldn't and had another loose bowl movement. I have informed the night nurse that adjustments may need to be made in his tube feedings since patient is unable to hold his bowels in long enough to make to the bathroom.

## 2017-08-11 PROBLEM — R41.82 ALTERED MENTAL STATE: Status: RESOLVED | Noted: 2017-01-01 | Resolved: 2017-01-01
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
## 2017-08-11 NOTE — PROGRESS NOTES
Bedside shift change report given to Ivan Patel (oncoming nurse) by Onita Simmonds (offgoing nurse). Report included the following information SBAR and Kardex.

## 2017-08-11 NOTE — PROGRESS NOTES
10:33 AM  Patient reviewed in rounds. CM informed that patient had 6 loose stools. RN following up with Dietician in regards to tube feedings/formula. CM informed adjustments may need to be made before discharge. RN to provide update to CM. CM contacted Selina Morales who will be following patient at discharge to provide current update. CM will continue to follow and assist with disposition needs as they arise. 12:03 PM  CM notified that patient has orders for discharge. CM informed that no adjustments need to be made to patient's tube feeding formula and supplies. CM contacted [facility] to provide update of patient discharge. Selina Morales has a home visit scheduled today at 3:00 pm to meet with patient and patient's son to provide tube feed formula and supplies. Mode of transport at time of discharge to be provided by patient's daughter. There are no other CM consults or needs. Patient medically stable for discharge.     TAM Tavera/CRM

## 2017-08-11 NOTE — PROGRESS NOTES
Bedside and Verbal shift change report given to [facility] RN (oncoming nurse) by Alvaro Garrett RN (offgoing nurse). Report included the following information SBAR, Kardex, Intake/Output, MAR, Recent Results and Med Rec Status.

## 2017-08-11 NOTE — PROGRESS NOTES
NUTRITION       Follow up. Chart reviewed, discussed with RN, NP. Also spoke with daughter over the phone. Pt with 6 loose stools in a 24 hour period (since feedings started). He has not had a loose stool since the evening shift and was reluctant to accept first two feedings today. Plan is for discharge home today with children. Spoke with daughter. Encouraged to attempt at least 3 cans per day at home and to administer feedings over 30 minutes or more to optimize tolerance. If he feels full, she may take a break. Suspect oral intake will continue to be variable as it was PTA (he can go for days without eating or drinking), so 5 cans of Two Rubens HN remains his goal.  This will provide 2375 kcal, 100 g protein and 2080 mL total free water (tf + flush), which meets 96% and 100% of estimated kcal and protein needs, respectively. Per report, [employer] RD to follow as outpatient and to assist with education at home. Suspect loose stools were related to multiple things rather than formula alone:  1. Pt is on antibiotics-- probiotic ordered to start today while he remains on antibiotic course  2. Daily miralax administered 3 times in the past 6 days-- miralax discontinued. 3. PPI has been given BID-- protonix changed to once/day  4.  Carafate 4x/day-- order changed to prn    Last standing weight (8/5/17): 82.4 kg    Estimated Nutrition Needs:   Kcals/day: 3536 Kcals/day (7414-6504 kcal/day (30-32 kcal/kg))  Protein: 99 g (1.2 g/kg -- adj per renal function)  Fluid: 2472 ml (1 mL/kcal)     Based On: Kcal/kg - specify (Comment)  Weight Used: Actual wt (82.4 kg)    RD to follow and assist further prn    Mona Villegas, [address]

## 2017-08-11 NOTE — DISCHARGE SUMMARY
Discharge Summary     PATIENT ID: Laurie Soler Sr. MRN: [id number]   YOB: 1931    DATE OF ADMISSION: 8/5/2017  4:55 PM    DATE OF DISCHARGE: 8/11/2017  PRIMARY CARE PROVIDER: Kushal Bermudez MD   ATTENDING PHYSICIAN: Johnathan Mcpherson MD  DISCHARGING PROVIDER: Desean Bangura, NP. To contact this individual call [phone] and ask the  to page. If unavailable ask to be transferred the Adult Hospitalist Department. CONSULTATIONS: IP CONSULT TO HOSPITALIST  IP CONSULT TO GASTROENTEROLOGY    PROCEDURES/SURGERIES: Procedure(s):  ESOPHAGOGASTRODUODENOSCOPY (EGD)   PERCUTANEOUS ENDOSCOPIC GASTROSTOMY TUBE INSERTION  ENDOSCOPY WITH PROSTHESIS OR STENT REMOVAL    ADMITTING [address] [place] [address] COURSE:   Pt presented to the ED for altered mental status and inability to tolerated solid foods. ~ Five days PTA, an esophageal stent was placed by Dr. Liza Morocho then ~ 2 days PTA, a port was placed in anticipation for chemo. At the time of port placement, it was noticed that the esophageal stent may have migrated. Plans were made to remove the stent and place a PEG but then the pt started having dizziness and low blood pressure. She contacted his PCP who advised him to stop taking his lisinopril. He then fell and continued to have dizziness and have signs of confusion so called EMS. DISCHARGE DIAGNOSES / PLAN:      Suspected Aspiration during EGD/PEG 8/8:  - pts BP and HR elevated with increased oxygen requirement  - bolused in Endo and started on Zosyn 8/8  - transferred to tele for increased HR and closer monitoring but then downgraded with resolution of symptoms  - Chest Xray: New bilateral perihilar airspace disease may represent pulmonary  edema or pneumonia.  Small bilateral pleural effusions  - fine crackles in L base 8/10 - fluids stopped, given low dose lasix  - continue with zosyn for now, change to augmentin on discharge     Odynophagia,  likely related to Esophageal cancer:  - Dr. Raimundo Duncan follows pt for his esophageal cancer - he is to start chemo next week (she is  aware if recent events)  - CXR 8/6: esophageal stent migrated into the fundus of the stomach  - 8/8: removal of stent and PEG placed  - TF started, was having some loose stools overnight so TF was held (then has not had any further BMs). This may be adjustment to TF though could be medication related. Will add probiotic, stop miralax, reduce PPI to daily and carafate. Dietitian has called and discussed TF troubleshooting/adjustments with pts daughter and she can call [phone] for further suggestions. - daughter to call [facility] once at home to come for education (at home)     CKD 3: stable     Mild Hyperkalemia: resolved     S/p Fall, PTA: no PT needs on d/c     Hx HTN: continue Lisinopril , BP stable     BPH: Proscar and Flomax     FOLLOW UP APPOINTMENTS:    Follow-up Information     Follow up With Details Comments Contact Info    Beatriz Rodas On 8/11/2017 new PEG tube supplies [address]    Liliana Reynolds MD  As needed [address] [address] [phone]      Patric Park MD  next week for chemo as previously scheduled [address] [address]      Keila Cain MD  in 2-4 weeks [address] [place] [address],[address]  [phone]           ADDITIONAL CARE RECOMMENDATIONS:   - hold off on any laxatives or stool softeners  - PPI has been adjusted to once daily    DIET: Mechanical Soft  Two Rubens HN, 240 mL 5x/day (0600, 0900, 1200, 1500, 1800) + 100 mL flush before and after each feeding    ACTIVITY: Activity as tolerated    EQUIPMENT needed: as per [facility] for tubefeeding    DISCHARGE MEDICATIONS:  Current Discharge Medication List      START taking these medications    Details   L. acidoph & paracasei- S therm- Bifido (ROMI-Q/RISAQUAD) 8 billion cell cap cap Take 1 Cap by mouth daily.   Qty: 10 Cap, Refills: 0      amoxicillin-clavulanate (AUGMENTIN) 875-125 mg per tablet Take 1 Tab by mouth every twelve (12) hours for 5 days. Qty: 10 Tab, Refills: 0         CONTINUE these medications which have CHANGED    Details   pantoprazole (PROTONIX) 40 mg tablet Take 1 Tab by mouth daily. Gastric cancer, hx bleeding  Qty: 30 Tab, Refills: 0      sucralfate (CARAFATE) 100 mg/mL suspension Take 10 mL by mouth four (4) times daily as needed. Qty: 500 mL, Refills: 0      raNITIdine (ZANTAC) 150 mg tablet Take 1 Tab by mouth two (2) times daily as needed for Indigestion. Qty: 20 Tab, Refills: 0         CONTINUE these medications which have NOT CHANGED    Details   traMADol (ULTRAM) 50 mg tablet Take 50 mg by mouth every eight (8) hours as needed for Pain. lisinopril (PRINIVIL, ZESTRIL) 10 mg tablet Take 5 mg by mouth daily. aspirin delayed-release 81 mg tablet Take 81 mg by mouth daily. loratadine (CLARITIN) 10 mg tablet Take 10 mg by mouth daily. finasteride (PROSCAR) 5 mg tablet Take 5 mg by mouth daily. cyanocobalamin (VITAMIN B-12) 1,000 mcg tablet Take 1,000 mcg by mouth daily. tamsulosin (FLOMAX) 0.4 mg capsule Take 0.8 mg by mouth daily. calcium-cholecalciferol, d3, (CALCIUM 600 + D) 600-125 mg-unit tab Take  by mouth daily. gabapentin (NEURONTIN) 800 mg tablet Take  by mouth three (3) times daily. STOP taking these medications       polyethylene glycol (MIRALAX) 17 gram packet Comments:   Reason for Stopping:             NOTIFY YOUR PHYSICIAN FOR ANY OF THE FOLLOWING:   Fever over 101 degrees for 24 hours. Chest pain, shortness of breath, fever, chills, nausea, vomiting, diarrhea, change in mentation, falling, weakness, bleeding. Severe pain or pain not relieved by medications. Or, any other signs or symptoms that you may have questions about.     DISPOSITION:  xx  Home With:   OT  PT xx HH xx RN       Long term SNF/Inpatient Rehab    Independent/assisted living    Hospice    Other:     PATIENT CONDITION AT DISCHARGE:   Functional status Poor     Deconditioned    xx Independent      Cognition   xx  Lucid     Forgetful     Dementia      Catheters/lines (plus indication)    Frye     PICC    xx PEG     None      Code status   xx  Full code     DNR      PHYSICAL EXAMINATION AT DISCHARGE:  /80 (BP 1 Location: Left arm, BP Patient Position: At rest)  Pulse 88  Temp 98 °F (36.7 °C)  Resp 18  Ht 6' (1.829 m)  Wt 80.7 kg (177 lb 14.6 oz)  SpO2 95%  BMI 24.13 kg/m2    Pt up in chair, no new complaints - no BM since last night. No abdominal pain or cramping, no N/V. Looking forward to going home today. Called and discussed plan of care with pts daughter over the phone. Constitutional:  No acute distress, cooperative, pleasant    ENT:  Oral mucous moist   Resp:  CTA. On RA   CV:  Regular rhythm, regular rate    GI:  Soft, non distended, abdominal binder on, PEG. Normoactive bowel sounds. + BM (loose)    Musculoskeletal:  No edema, warm, 2+ pulses throughout    Neurologic:  Moves all extremities. AAOx3.                               Skin:  Good turgor, no rashes or ulcers     CHRONIC MEDICAL DIAGNOSES:  Problem List as of 8/11/2017  Date Reviewed: 8/11/2017          Codes Class Noted - Resolved    * (Principal)Dysphasia ICD-10-CM: R47.02  ICD-9-CM: 784.59  8/5/2017 - Present        Esophageal dysphagia ICD-10-CM: R13.14  ICD-9-CM: 787.24  7/31/2017 - Present        Pain ICD-10-CM: R52  ICD-9-CM: 780.96  7/31/2017 - Present        [facility] carcinoma ([facility] ICD-10-CM: C16.0  ICD-9-CM: 151.0  7/19/2017 - Present        Gastroesophageal reflux disease without esophagitis ICD-10-CM: K21.9  ICD-9-CM: 530.81  6/13/2017 - Present        DNR (do not resuscitate) ICD-10-CM: Z66  ICD-9-CM: V49.86  4/5/2017 - Present        Decreased hearing of both ears ICD-10-CM: H91.93  ICD-9-CM: 389.9  4/5/2017 - Present        Encounter for Hemoccult screening ICD-10-CM: Z12.11  ICD-9-CM: V76.51  4/5/2017 - Present        Rash of entire body ICD-10-CM: R21  ICD-9-CM: 782.1  9/15/2016 - Present        Chronic constipation ICD-10-CM: K59.09  ICD-9-CM: 564.00  6/11/2014 - Present        Hip pain, left ICD-10-CM: M25.552  ICD-9-CM: 719.45  4/11/2014 - Present        Back pain, chronic ICD-10-CM: M54.9, G89.29  ICD-9-CM: 724.5, 338.29  4/11/2014 - Present        Need for varicella vaccine ICD-10-CM: Z23  ICD-9-CM: V05.4  4/11/2014 - Present        Tingling sensation ICD-10-CM: R20.2  ICD-9-CM: 782.0  5/9/2013 - Present        Acute bronchitis ICD-10-CM: J20.9  ICD-9-CM: 466.0  4/5/2013 - Present        Special screening for osteoporosis ICD-10-CM: Z13.820  ICD-9-CM: V82.81  4/5/2013 - Present        Insomnia ICD-10-CM: G47.00  ICD-9-CM: 780.52  8/1/2012 - Present        Kidney function abnormal ICD-10-CM: N28.9  ICD-9-CM: 593.9  8/1/2012 - Present        Elevated PSA measurement ICD-10-CM: R97.20  ICD-9-CM: 790.93  8/1/2012 - Present        B12 deficiency ICD-10-CM: E53.8  ICD-9-CM: 266.2  8/1/2012 - Present        Vitamin D deficiency ICD-10-CM: E55.9  ICD-9-CM: 268.9  8/1/2012 - Present        AR (allergic rhinitis) ICD-10-CM: J30.9  ICD-9-CM: 477.9  8/1/2012 - Present        Hypercholesterolemia ICD-10-CM: E78.00  ICD-9-CM: 272.0  7/11/2012 - Present        Urinary frequency ICD-10-CM: R35.0  ICD-9-CM: 788.41  7/11/2012 - Present        Left knee DJD ICD-10-CM: M17.12  ICD-9-CM: 715.96  12/13/2011 - Present    Overview Signed 12/13/2011  4:27 PM by Claudia Krishnan PA-C     Scheduled for LEFT TKR on 12-             HTN (hypertension) ICD-10-CM: I10  ICD-9-CM: 401.9  12/13/2011 - Present        RESOLVED: Altered mental state ICD-10-CM: R41.82  ICD-9-CM: 780.97  8/5/2017 - 8/11/2017        RESOLVED: Fall ICD-10-CM: Irma Cotter  ICD-9-CM: K617.5  8/5/2017 - 8/11/2017            Greater than 30 minutes were spent with the patient on counseling and coordination of care    Signed:   Murray Orozco NP  8/11/2017  1:09 PM

## 2017-08-11 NOTE — CDMP QUERY
Please clarify if this patient is being treated/managed for:    =>Possible aspirational pneumonia in the setting of suspected aspiration during EGD/peg placement w/CXR showing new carey perihilar airspace disease requiring telemetry monitoring and Zosyn IV. =>Other Explanation of clinical findings  =>Unable to Determine (no explanation of clinical findings)    The medical record reflects the following:     Risk Factors: Suspected Aspiration during EGD/PEG 8/8  Clinical Indicators: PT had increased BP and heart rate with increased oxygen requirement post PEG tube placement. CXR resulted in: New bilateral perihilar airspace disease may represent pulmonary  edema or pneumonia. Small bilateral pleural effusions    Treatment: Initially transferred to tele for additional monitoring, Zosyn IV, Monitor VS, increased oxygen needs to 6L NC and then Non-rebreather. Please clarify and document your clinical opinion in the progress notes and discharge summary including the definitive and/or presumptive diagnosis, (suspected or probable), related to the above clinical findings. Please include clinical findings supporting your diagnosis.     Thank you,    Ebonie Dior, RN, BSN, [facility], [address]  [phone]

## 2017-08-11 NOTE — PROGRESS NOTES
6388- Pt has had 5 large, loose stools since the initiation of his tube feeding. He's saying he doesn't want his morning feeds because he's tired of pooping. He says it is leaking out of his body and he is upset he has to wear a brief. Will hold Am tube feed per pt refusal and pass on to Dietician and MD in rounds that tube feed quantity and/or rate may need to be adjusted. Pt did eat most of his lunch and dinner.

## 2017-08-11 NOTE — PROGRESS NOTES
[address] [facility] [address]  [address] [facility] [address], [address]  [phone]                GI PROGRESS NOTE  Alan Lee AGACNP-BC  Work Cell: [phone]      NAME:   Karin Ramirez Sr.   :    1931   MRN:    [id number]     Assessment/Plan   1. 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 Had a cough, tachycardia and increase in oxygen needs post procedure. Chest x-ray consistent with pneumonia. Feeling well and eager to go home. - Supportive management per primary team  - Continue antibiotics  - Mechanical soft diet per speech recommendations   - TF via PEG per nutrition recommendations   - Okay to discharge from GI standpoint to follow-up outpatient in 2-4 weeks      Patient Active Problem List   Diagnosis Code    Left knee DJD M17.12    HTN (hypertension) I10    Hypercholesterolemia E78.00    Urinary frequency R35.0    Insomnia G47.00    Kidney function abnormal N28.9    Elevated PSA measurement R97.20    B12 deficiency E53.8    Vitamin D deficiency E55.9    AR (allergic rhinitis) J30.9    Acute bronchitis J20.9    Special screening for osteoporosis Z13.820    Tingling sensation R20.2    Hip pain, left M25.552    Back pain, chronic M54.9, G89.29    Need for varicella vaccine Z23    Chronic constipation K59.09    Rash of entire body R21    DNR (do not resuscitate) Z66    Decreased hearing of both ears H91.93    Encounter for Hemoccult screening Z12.11    Gastroesophageal reflux disease without esophagitis K21.9    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 XXXA       Subjective:     Feeling fine. Reports having multiple loose stools yesterday. Believes it may be related to his tube feedings. He has not had any today. Denies any nausea or vomiting.  Eating soft diet without difficulty. Review of Systems    Constitutional: negative fever, negative chills, negative weight loss  Eyes:   negative visual changes  ENT:   negative sore throat, tongue or lip swelling  Respiratory:  negative cough, negative dyspnea  Cards:  negative for chest pain, palpitations, lower extremity edema  GI:   See HPI  :  negative for frequency, dysuria  Integument:  negative for rash and pruritus  Heme:  negative for easy bruising and gum/nose bleeding  Musculoskel: negative for myalgias, back pain and muscle weakness  Neuro: negative for headaches, dizziness, vertigo  Psych:  negative for feelings of anxiety, depression     Objective:     VITALS:   Last 24hrs VS reviewed since prior hospitalist progress note. Most recent are:  Visit Vitals    /80 (BP 1 Location: Left arm, BP Patient Position: At rest)    Pulse 88    Temp 98 °F (36.7 °C)    Resp 18    Ht 6' (1.829 m)    Wt 80.7 kg (177 lb 14.6 oz)    SpO2 95%    BMI 24.13 kg/m2       Intake/Output Summary (Last 24 hours) at 08/11/17 0943  Last data filed at 08/11/17 0830   Gross per 24 hour   Intake             1000 ml   Output                0 ml   Net             1000 ml        PHYSICAL EXAM:  General   well developed, thin, elderly, alert, in no acute distress  EENT  Normocephalic, Atraumatic, PERRLA, EOMI, sclera clear  Respiratory   Clear To Auscultation bilaterally - no wheezes, rales, rhonchi, or crackles  Cardiology  Regular Rate and Rythmn  - no murmurs, rubs or gallops  Abdominal  Soft, non-tender, non-distended, PEG tube in LUQ, positive bowel sounds, no hepatosplenomegaly, no palpable mass  Extremities  No clubbing, cyanosis, or edema. Pulses intact. Neurological  No focal neurological deficits noted  Psychological  Oriented x 3.  Normal affect.      Lab Data   Recent Results (from the past 12 hour(s))   CBC W/O DIFF    Collection Time: 08/11/17  3:39 AM   Result Value Ref Range    WBC 14.9 (H) 4.1 - 11.1 K/uL    RBC 3.00 (L) 4.10 - 5.70 M/uL    HGB 9.6 (L) 12.1 - 17.0 g/dL    HCT 27.2 (L) 36.6 - 50.3 %    MCV 90.7 80.0 - 99.0 FL    MCH 32.0 26.0 - 34.0 PG    MCHC 35.3 30.0 - 36.5 g/dL    RDW 12.3 11.5 - 14.5 %    PLATELET 088 694 - 808 K/uL   METABOLIC PANEL, BASIC    Collection Time: 08/11/17  3:39 AM   Result Value Ref Range    Sodium 139 136 - 145 mmol/L    Potassium 3.2 (L) 3.5 - 5.1 mmol/L    Chloride 104 97 - 108 mmol/L    CO2 27 21 - 32 mmol/L    Anion gap 8 5 - 15 mmol/L    Glucose 106 (H) 65 - 100 mg/dL    BUN 32 (H) 6 - 20 MG/DL    Creatinine 1.57 (H) 0.70 - 1.30 MG/DL    BUN/Creatinine ratio 20 12 - 20      GFR est AA 51 (L) >60 ml/min/1.73m2    GFR est non-AA 42 (L) >60 ml/min/1.73m2    Calcium 8.6 8.5 - 10.1 MG/DL         Medications: Reviewed    PMH/SH reviewed - no change compared to H&P  Mid-Level Provider: Reji Sorenson NP   Date/Time:  8/11/2017

## 2017-08-14 NOTE — TELEPHONE ENCOUNTER
HIPAA verified. Daughter stated needs to schedule chemo teach prior to chemo start 8/16/17. Appt for teaching given 8/15/17 @ 4p. No questions at present.

## 2017-08-15 NOTE — TELEPHONE ENCOUNTER
Call to daughter. HIPAA verified. Pt was unable to make appointment for chemo teach today due to weakness and loose stools. He was incontinent of loose stool this AM and fell on carpet on his way to the bathroom from the bedroom. She was outside at the time. States patient was unharmed and able to get himself up. He has difficulty ambulating and can \"barely stand up straight\". We did discuss safety in the home and the importance of hydration. She is hoping for a PT referral to get him stronger and to maintain safety. Also requesting hospital bed order as he remains on tube feedings. We discussed the need to change chemotherapy date to allow patient to become stronger. Radiation simulation has also been moved to 8/22 due to hospitalization. She became very emotional at end of the conversation. She is trying to remain strong for her father but it is \"hard to keep the positive face\". Emotional support provided. She expressed concerns the cancer may spread if we delay treatment. We discussed the need for patient to be strong enough for treatment. Agreeable to plan. Encouraged her to call with any questions/concerns. Will follow with daughters request.    Forward to provider/navigator/dietician.

## 2017-08-16 NOTE — TELEPHONE ENCOUNTER
[address] [facility]   [phone]    Nutrition Referral    Referral received. Called and spoke with daughter, explained that RD is available to address nutrition throughout the spectrum of care. Recent events: new diagnosis of esophageal cancer with stent placement on 7/31 due to dysphagia, stent migrated into proximal stomach. S/p stent removed on PEG placed on 8/8. During admission was having loose stools. Discharged on 8/11 and was followed by inpatient RD. Discharge Tube Feed orders:  TwoCal HN 5 cans per day. Water flushes 100ml before and after each can.  which will provide 2375kcal and 100g protein. Current TF Regimen  Mr. Fuentes daughter reports he is averaging 4 cans per day with 100ml water before and after each can. Additional 100ml water given with medications 3 times per day. Providing him with 1900kcal, 76g protein and 1764 free water (TF+Flushes). Nausea:  Daughter reports he is having nausea with tube feeds, and wonders about antiemetic. Will pass on to MD/NP. Bowels:  Reports bowels movement are less frequent and seemed to be more formed than loose. Provided contact information and days available at oncology office.     Estimated Nutrition Needs:   Kcals/day: 8249 Kcals/day (9901-5817 kcal/day (30-32 kcal/kg))  Protein: 99 g (1.2 g/kg -- adj per renal function)  Fluid: 2472 ml (1 mL/kcal)     Based On: Kcal/kg - specify (Comment)  Weight Used: Actual wt (82.4 kg)  Soheila Iraheta, [place], [address] ,

## 2017-08-17 PROBLEM — R53.81 PHYSICAL DECONDITIONING: Status: ACTIVE | Noted: 2017-01-01

## 2017-08-17 NOTE — PROGRESS NOTES
Chief Complaint   Patient presents with   [facility] Follow Up     fall, confusion     Pt admitted to [facility]  for falls and confusion on 8/5/17,  Discharged  8/11/17,  Denies injury from ground level fall        Home health referral called to [facility],  Spoke with Laurie,  Referral information given at this time

## 2017-08-17 NOTE — MR AVS SNAPSHOT
Visit Information Date & Time Provider Department Dept. Phone Encounter #  
 8/17/2017 12:15 PM Shannan Patino MD [address] [facility] OFFICE-ANNEX [phone] [id number] Follow-up Instructions Return in about 4 months (around 12/17/2017), or if symptoms worsen or fail to improve. Your Appointments 8/23/2017 11:30 AM  
Follow Up with Perez Acuna  [facility] at [facility]) Appt Note: follow up [address] [facility] 209 [address] [facility] [address]  
[phone]  
  
   
 [address] [address]  
  
    
 8/30/2017 11:30 AM  
Follow Up with Roberta Flores MD  
[address] [facility] at [facility]) Appt Note: follow up [address] [facility] 209 [facility]  
[phone]  
  
   
 [address] [address]  
  
    
 9/13/2017 11:00 AM  
ROUTINE CARE with Shannan Patino MD  
69 [facility] OFFICE-ANNEX ([address]) Appt Note: 3 mo f/u; r/s  
 [address] [facility] [phone]  
[phone] [phone]  
  
    
 10/5/2017  3:00 PM  
ROUTINE CARE with Shannan Patino MD  
[facility] OFFICE-ANNEX ([address]) Appt Note: 6 mnth fu  
 [address] [facility] [phone]  
[phone] Upcoming Health Maintenance Date Due  
 GLAUCOMA SCREENING Q2Y 3/13/2016 INFLUENZA AGE 9 TO ADULT 8/1/2017 MEDICARE YEARLY EXAM 4/6/2018 DTaP/Tdap/Td series (2 - Td) 4/11/2024 Allergies as of 8/17/2017  Review Complete On: 8/17/2017 By: Shandra Lopez LPN No Known Allergies Current Immunizations  Reviewed on 8/6/2015 Name Date Influenza High Dose Vaccine PF 9/15/2016, 12/7/2015 Influenza Vaccine 4/11/2014 Influenza Vaccine PF 10/9/2013 Pneumococcal Conjugate (PCV-13) 8/6/2015 Pneumococcal Polysaccharide (PPSV-23) 4/11/2014 Tdap 4/11/2014 Not reviewed this visit You Were Diagnosed With   
  
 Codes Comments GE junction carcinoma ([facility]    -  Primary ICD-10-CM: C16.0 ICD-9-CM: 151.0 Dysphasia     ICD-10-CM: R47.02 
ICD-9-CM: 784.59 Physical deconditioning     ICD-10-CM: R53.81 ICD-9-CM: 799.3 Vitals BP Pulse Temp Resp Height(growth percentile) Weight(growth percentile) (!) 133/93 (BP 1 Location: Left arm, BP Patient Position: Sitting) 88 97.7 °F (36.5 °C) (Oral) 14 6' (1.829 m) 188 lb 12.8 oz (85.6 kg) SpO2 BMI Smoking Status 94% 25.61 kg/m2 Former Smoker BMI and BSA Data Body Mass Index Body Surface Area  
 25.61 kg/m 2 2.09 m 2 Preferred Pharmacy Pharmacy Name Phone [id number]- [address] [facility] [phone] Your Updated Medication List  
  
   
This list is accurate as of: 8/17/17  1:18 PM.  Always use your most recent med list.  
  
  
  
  
 ALLERGY RELIEF(CHLORPHENIRAMN) 4 mg tablet Generic drug:  chlorpheniramine TAKE 1 TABLET BY MOUTH TWICE A DAY AS NEEDED FOR ALLERGIES OR RHINITIS  
  
 aspirin delayed-release 81 mg tablet Take 81 mg by mouth daily. calcium-cholecalciferol (d3) 600-125 mg-unit Tab Commonly known as:  CALCIUM 600 + D Take 1 Tab by mouth daily. CLARITIN 10 mg tablet Generic drug:  loratadine Take 10 mg by mouth daily. cyanocobalamin 1,000 mcg tablet Commonly known as:  VITAMIN B-12 Take 1 Tab by mouth daily. fluticasone 50 mcg/actuation nasal spray Commonly known as:  FLONASE  
1 [place] by Both Nostrils route two (2) times a day. L. acidoph & paracasei- S therm- Bifido 8 billion cell Cap cap Commonly known as:  ROMI-Q/RISAQUAD Take 1 Cap by mouth daily. lisinopril 10 mg tablet Commonly known as:  Darian Boehringer Take 5 mg by mouth daily. NEURONTIN 800 mg tablet Generic drug:  gabapentin Take  by mouth three (3) times daily. ondansetron hcl 8 mg tablet Commonly known as:  Iona Sames Take 1 Tab by mouth every eight (8) hours as needed for Nausea. pantoprazole 40 mg tablet Commonly known as:  PROTONIX Take 1 Tab by mouth daily. Gastric cancer, hx bleeding  
  
 prochlorperazine 10 mg tablet Commonly known as:  COMPAZINE  
TAKE 1 TABLET BY MOUTH 4 TIMES A DAY FOR 8 WEEKS AS NEEDED  
  
 PROSCAR 5 mg tablet Generic drug:  finasteride Take 5 mg by mouth daily. raNITIdine 150 mg tablet Commonly known as:  ZANTAC Take 1 Tab by mouth two (2) times daily as needed for Indigestion. sucralfate 100 mg/mL suspension Commonly known as:  Janette Jurist Take 10 mL by mouth four (4) times daily as needed. tamsulosin 0.4 mg capsule Commonly known as:  FLOMAX Take 0.8 mg by mouth daily. traMADol 50 mg tablet Commonly known as:  ULTRAM  
Take 50 mg by mouth every eight (8) hours as needed for Pain. Prescriptions Sent to Pharmacy Refills L. acidoph & paracasei- S therm- Bifido (ROMI-Q/RISAQUAD) 8 billion cell cap cap 6 Sig: Take 1 Cap by mouth daily. Class: Normal  
 Pharmacy: [address] Ph #: [phone] Route: Oral  
 calcium-cholecalciferol, d3, (CALCIUM 600 + D) 600-125 mg-unit tab 7 Sig: Take 1 Tab by mouth daily. Class: Normal  
 Pharmacy: [address] Ph #: [phone] Route: Oral  
  
We Performed the Following [address] [place] [address] Comments:  
 Please evaluate patient for physical deconditioning REFERRAL TO OPTOMETRY [id number] Custom] Comments:  
 Please evaluate patient for glaucoma screening Follow-up Instructions Return in about 4 months (around 12/17/2017), or if symptoms worsen or fail to improve. To-Do List   
 08/23/2017 11:00 AM  
  Appointment with  [facility] ([phone])  
  
 08/30/2017 11:00 AM  
  Appointment with [address] [facility] ([phone])  
  
 09/06/2017 11:00 AM  
  Appointment with [address] [place] [address] ([phone])  
  
 09/13/2017 10:00 AM  
  Appointment with [address] [place] [address] ([phone]) Referral Information Referral ID Referred By Referred To  
  
 [id number] [place], [address] Cindy Hunt MD   
   [address], [address] [place] [address] Street Phone: [phone] Fax: [phone] Visits Status Start Date End Date 1 New Request 8/17/17 8/17/18 If your referral has a status of pending review or denied, additional information will be sent to support the outcome of this decision. Referral ID Referred By Referred To  
 [id number] Virl Copper Not Available Visits Status Start Date End Date 1 New Request 8/17/17 8/17/18 If your referral has a status of pending review or denied, additional information will be sent to support the outcome of this decision. Patient Instructions Preventing Falls: Care Instructions Your Care Instructions Getting around your home safely can be a challenge if you have injuries or health problems that make it easy for you to fall. Loose rugs and furniture in walkways are among the dangers for many older people who have problems walking or who have poor eyesight. People who have conditions such as arthritis, osteoporosis, or dementia also have to be careful not to fall. You can make your home safer with a few simple measures. Follow-up care is a key part of your treatment and safety.  Be sure to make and go to all appointments, and call your doctor if you are having problems. It's also a good idea to know your test results and keep a list of the medicines you take. How can you care for yourself at home? Taking care of yourself · You may get dizzy if you do not drink enough water. To prevent dehydration, drink plenty of fluids, enough so that your urine is light yellow or clear like water. Choose water and other caffeine-free clear liquids. If you have kidney, heart, or liver disease and have to limit fluids, talk with your doctor before you increase the amount of fluids you drink. · Exercise regularly to improve your strength, muscle tone, and balance. Walk if you can. Swimming may be a good choice if you cannot walk easily. · Have your vision and hearing checked each year or any time you notice a change. If you have trouble seeing and hearing, you might not be able to avoid objects and could lose your balance. · Know the side effects of the medicines you take. Ask your doctor or pharmacist whether the medicines you take can affect your balance. Sleeping pills or sedatives can affect your balance. · Limit the amount of alcohol you drink. Alcohol can impair your balance and other senses. · Ask your doctor whether calluses or corns on your feet need to be removed. If you wear loose-fitting shoes because of calluses or corns, you can lose your balance and fall. · Talk to your doctor if you have numbness in your feet. Preventing falls at home · Remove raised doorway thresholds, throw rugs, and clutter. Repair loose carpet or raised areas in the floor. · Move furniture and electrical cords to keep them out of walking paths. · Use nonskid floor wax, and wipe up spills right away, especially on ceramic tile floors. · If you use a walker or cane, put rubber tips on it. If you use crutches, clean the bottoms of them regularly with an abrasive pad, such as steel wool.  
· Keep your house well lit, especially [place], and outside walkways. Use night-lights in areas such as hallways and bathrooms. Add extra light switches or use remote switches (such as switches that go on or off when you clap your hands) to make it easier to turn lights on if you have to get up during the night. · Install sturdy handrails on stairways. · Move items in your cabinets so that the things you use a lot are on the lower shelves (about waist level). · Keep a cordless phone and a flashlight with new batteries by your bed. If possible, put a phone in each of the main rooms of your house, or carry a cell phone in case you fall and cannot reach a phone. Or, you can wear a device around your neck or wrist. You push a button that sends a signal for help. · Wear low-heeled shoes that fit well and give your feet good support. Use footwear with nonskid soles. Check the heels and soles of your shoes for wear. Repair or replace worn heels or soles. · Do not wear socks without shoes on wood floors. · Walk on the grass when the sidewalks are slippery. If you live in an area that gets snow and ice in the winter, sprinkle salt on slippery steps and sidewalks. Preventing falls in the bath · Install grab bars and nonskid mats inside and outside your shower or tub and near the toilet and sinks. · Use shower chairs and bath benches. · Use a hand-held shower head that will allow you to sit while showering. · Get into a tub or shower by putting the weaker leg in first. Get out of a tub or shower with your strong side first. 
· Repair loose toilet seats and consider installing a raised toilet seat to make getting on and off the toilet easier. · Keep your bathroom door unlocked while you are in the shower. Where can you learn more? Go to http://germán-rishi.info/. Enter [id number] [id number] in the search box to learn more about \"Preventing Falls: Care Instructions. \" Current as of: August 4, 2016 Content Version: 11.3 © 6721-7275 Healthwise, Incorporated. Care instructions adapted under license by [employer] (which disclaims liability or warranty for this information). If you have questions about a medical condition or this instruction, always ask your healthcare professional. [address] any warranty or liability for your use of this information. How to Get Up Safely After a Fall: Care Instructions Your Care Instructions If you have injuries, health problems, or other reasons that may make it easy for you to fall at home, it is a good idea to learn how to get up safely after a fall. Learning how to get up correctly can help you avoid making an injury worse. Also, knowing what to do if you cannot get up can help you stay safe until help arrives. Follow-up care is a key part of your treatment and safety. Be sure to make and go to all appointments, and call your doctor if you are having problems. It's also a good idea to know your test results and keep a list of the medicines you take. How can you care for yourself after a fall? If you think you can get up First lie still for a few minutes and think about how you feel. If your body feels okay and you think you can get up safely, follow the rest of the steps below: 1. Look for a chair or other piece of furniture that is close to you. 2. Roll onto your side and rest. Roll by turning your head in the direction you want to roll, move your shoulder and arm, then hip and leg in the same direction. 3. Lie still for a moment to let your blood pressure adjust. 
4. Slowly push your upper body up, lift your head, and take a moment to rest. 
5. Slowly get up on your hands and knees, and crawl to the chair or other stable piece of furniture. 6. Put your hands on the chair. 7. Move one foot forward, and place it flat on the floor. Your other leg should be bent with the knee on the floor. 8. Rise slowly, turn your body, and sit in the chair. Stay seated for a bit and think about how you feel. Call for help. Even if you feel okay, let someone know what happened to you. You might not know that you have a serious injury. If you cannot get up 1. If you think you are injured after a fall or you cannot get up, try not to panic. 2. Call out for help. 3. If you have a phone within reach or you have an emergency call device, use it to call for help. 4. If you do not have a phone within reach, try to slide yourself toward it. If you cannot get to the phone, try to slide toward a door or window or a place where you think you can be heard. 5. [place] or use an object to make noise so someone might hear you. 6. If you can reach something that you can use for a pillow, place it under your head. Try to stay warm by covering yourself with a blanket or clothing while you wait for help. When should you call for help? Call 911 anytime you think you may need emergency care. For example, call if: 
· You passed out (lost consciousness). · You cannot get up after a fall. · You have severe pain. Call your doctor now or seek immediate medical care if: 
· You have new or worse pain. · You are dizzy or lightheaded. · You hit your head. Watch closely for changes in your health, and be sure to contact your doctor if: 
· You do not get better as expected. Where can you learn more? Go to http://germán-rishi.info/. Enter N418 in the search box to learn more about \"How to Get Up Safely After a Fall: Care Instructions. \" Current as of: August 4, 2016 Content Version: 11.3 © 2009-0627 [employer]. Care instructions adapted under license by [employer] (which disclaims liability or warranty for this information).  If you have questions about a medical condition or this instruction, always ask your healthcare professional. [employer] Incorporated disclaims any warranty or liability for your use of this information. Introducing [facility] & HEALTH SERVICES! Dear Lexy Olivas: Thank you for requesting a [employer] account. Our records indicate that you already have an active [employer] account. You can access your account anytime at [email or web address]. [employer]/SeeChange Health Did you know that you can access your hospital and ER discharge instructions at any time in [employer]? You can also review all of your test results from your hospital stay or ER visit. Additional Information If you have questions, please visit the Frequently Asked Questions section of the [employer] website at [email or web address] Remember, [employer] is NOT to be used for urgent needs. For medical emergencies, dial 911. Now available from your iPhone and Android! Please provide this summary of care documentation to your next provider. Your primary care clinician is listed as Landry Chandler. If you have any questions after today's visit, please call [phone].

## 2017-08-17 NOTE — TELEPHONE ENCOUNTER
Call from daughter HIPAA verified. Pt states father is \"not better\". From the bedroom to the living room is only about 20 steps and he almost fell twice last night. [place] \"His legs just seem to collapse and his brain can't tell his legs to move\"    She is hoping for PT order, Home Health, bedside commode and a hospital bed due to tube feedings. They do meet with radiation today as well as PCP. Her brother had to take the day off to assist.  Advised patient does need to be stronger in order to start chemotherapy treatment. Verbalized understanding. Appreciative of support.

## 2017-08-17 NOTE — PROGRESS NOTES
HISTORY OF PRESENT ILLNESS  Obie Gould Sr. is a 80 y.o. male. HPI   On tube feeding, unable to eat, but able to drink, lives with the daughter unable to provide more help, getting radiation, no resection at this time, oncologist doing only chemox, and radiation,  Pt also refused the surgery done, currently on the R [address] feeling weak, lost some wt, trying to be active as much, had CT doen today as well, chemox not started was told to get stronger for the chemox, but it has been very hard for him so far, not mobile much at time, getting no help at the house      Current Outpatient Prescriptions   Medication Sig Dispense Refill    ALLERGY RELIEF,CHLORPHENIRAMN, 4 mg tablet TAKE 1 TABLET BY MOUTH TWICE A DAY AS NEEDED FOR ALLERGIES OR RHINITIS  1    fluticasone (FLONASE) 50 mcg/actuation nasal spray 1 [place] by Both Nostrils route two (2) times a day.  prochlorperazine (COMPAZINE) 10 mg tablet TAKE 1 TABLET BY MOUTH 4 TIMES A DAY FOR 8 WEEKS AS NEEDED  3    cyanocobalamin (VITAMIN B-12) 1,000 mcg tablet Take 1 Tab by mouth daily. 30 Tab 6    ondansetron hcl (ZOFRAN) 8 mg tablet Take 1 Tab by mouth every eight (8) hours as needed for Nausea. 30 Tab 5    L. acidoph & paracasei- S therm- Bifido (ROMI-Q/RISAQUAD) 8 billion cell cap cap Take 1 Cap by mouth daily. 10 Cap 0    pantoprazole (PROTONIX) 40 mg tablet Take 1 Tab by mouth daily. Gastric cancer, hx bleeding 30 Tab 0    sucralfate (CARAFATE) 100 mg/mL suspension Take 10 mL by mouth four (4) times daily as needed. 500 mL 0    raNITIdine (ZANTAC) 150 mg tablet Take 1 Tab by mouth two (2) times daily as needed for Indigestion. (Patient taking differently: Take  by mouth daily.) 20 Tab 0    traMADol (ULTRAM) 50 mg tablet Take 50 mg by mouth every eight (8) hours as needed for Pain.  lisinopril (PRINIVIL, ZESTRIL) 10 mg tablet Take 5 mg by mouth daily.  aspirin delayed-release 81 mg tablet Take 81 mg by mouth daily.       loratadine (CLARITIN) 10 mg tablet Take 10 mg by mouth daily.  finasteride (PROSCAR) 5 mg tablet Take 5 mg by mouth daily.  tamsulosin (FLOMAX) 0.4 mg capsule Take 0.8 mg by mouth daily.  calcium-cholecalciferol, d3, (CALCIUM 600 + D) 600-125 mg-unit tab Take  by mouth daily.  gabapentin (NEURONTIN) 800 mg tablet Take  by mouth three (3) times daily.        Facility-Administered Medications Ordered in Other Visits   Medication Dose Route Frequency Provider Last Rate Last Dose    [START ON 8/23/2017] 0.9% sodium chloride infusion  25 mL/hr IntraVENous CONTINUOUS Jadyn May MD        [START ON 8/23/2017] palonosetron HCl (ALOXI) injection 0.25 mg  0.25 mg IntraVENous ONCE Jadyn May MD        [START ON 8/23/2017] dexamethasone (DECADRON) 4 mg/mL injection 12 mg  12 mg IntraVENous ONCE Jadyn May MD        [START ON 8/23/2017] famotidine (PF) (PEPCID) 20 mg in sodium chloride 0.9 % 10 mL injection  20 mg IntraVENous ONCE Jadyn May MD        [START ON 8/23/2017] diphenhydrAMINE (BENADRYL) injection 25 mg  25 mg IntraVENous ONCE Jadyn May MD        [START ON 8/23/2017] PACLitaxel (TAXOL) 100 mg in 0.9% sodium chloride 250 mL, overfill volume 25 mL chemo infusion  100 mg IntraVENous ONCE Jadyn May MD        [START ON 8/23/2017] CARBOplatin (PARAPLATIN) 125 mg in 0.9% sodium chloride 250 mL, overfill volume 25 mL chemo infusion  125 mg IntraVENous ONCE Jadyn May MD         No Known Allergies  Past Medical History:   Diagnosis Date    Acid reflux     Arthritis     right  left knees    Back pain, chronic 4/11/2014    Cancer ([facility] 2017    esophegeal    Decreased hearing of both ears 4/5/2017    DNR (do not resuscitate) 4/5/2017    Gastroesophageal reflux disease without esophagitis 6/13/2017    GERD (gastroesophageal reflux disease)     Hip pain, left 4/11/2014    Hypercholesterolemia     Hypertension     Need for varicella vaccine 4/11/2014    Rash of entire body 9/15/2016    Restless leg syndrome      Past Surgical History:   Procedure Laterality Date    ABDOMEN SURGERY PROC UNLISTED  1980's    gall bladder    ABDOMEN SURGERY PROC UNLISTED  15 yrs ago    right hernia repair    HX HEENT      EYE SURGERY 40 YRS AGO.  HX ORTHOPAEDIC      fluid removed both knees    HX ORTHOPAEDIC  2010    bilateral knee replacements     Family History   Problem Relation Age of Onset    Heart Disease Sister      Social History   Substance Use Topics    Smoking status: Former Smoker     Years: 0.00     Quit date: 4/28/1970    Smokeless tobacco: Never Used    Alcohol use Yes      Comment: OCCASSIONALLY      Lab Results  Component Value Date/Time   Hemoglobin A1c 5.7 08/06/2015 01:28 PM   Hemoglobin A1c 5.6 04/11/2014 12:20 PM   Hemoglobin A1c 5.3 12/01/2011 01:13 PM   Glucose 106 08/11/2017 03:39 AM   Glucose (POC) 118 08/02/2017 08:19 AM   LDL, calculated 154 09/16/2016 11:03 AM   Creatinine (POC) 1.5 08/02/2017 08:19 AM   Creatinine 1.57 08/11/2017 03:39 AM      Lab Results  Component Value Date/Time   TSH 2.200 04/05/2017 03:43 PM         Review of Systems   Constitutional: Negative for chills and fever. HENT: Negative for congestion and nosebleeds. Eyes: Negative for blurred vision and pain. Respiratory: Negative for cough, shortness of breath and wheezing. Cardiovascular: Negative for chest pain and leg swelling. Gastrointestinal: Negative for constipation, diarrhea, nausea and vomiting. Genitourinary: Negative for dysuria and frequency. Musculoskeletal: Negative for joint pain and myalgias. Skin: Negative for itching and rash. Neurological: Negative for dizziness, loss of consciousness and headaches. Psychiatric/Behavioral: Negative for depression. The patient is not nervous/anxious and does not have insomnia. Physical Exam   Constitutional: He is oriented to person, place, and time. He appears well-developed and well-nourished.    HENT:   Head: Normocephalic and atraumatic. Mouth/Throat: No oropharyngeal exudate. Eyes: Conjunctivae and EOM are normal.   Neck: Normal range of motion. Neck supple. Cardiovascular: Normal rate, regular rhythm and normal heart sounds. No murmur heard. Pulmonary/Chest: Effort normal and breath sounds normal. No respiratory distress. Abdominal: Soft. Bowel sounds are normal. He exhibits no distension. There is no rebound. Musculoskeletal: He exhibits no edema or tenderness. Neurological: He is alert and oriented to person, place, and time. Skin: Skin is warm. No erythema. Psychiatric: He has a normal mood and affect. His behavior is normal.   Nursing note and vitals reviewed. ASSESSMENT and PLAN  Diagnoses and all orders for this visit:    1. GE junction carcinoma (HCC)  -     REFERRAL TO OPTOMETRY  -     L. acidoph & paracasei- S therm- Bifido (ROMI-Q/RISAQUAD) 8 billion cell cap cap; Take 1 Cap by mouth daily. -     calcium-cholecalciferol, d3, (CALCIUM 600 + D) 600-125 mg-unit tab; Take 1 Tab by mouth daily.  -     REFERRAL TO HOME HEALTH    2. Dysphasia  -     REFERRAL TO OPTOMETRY  -     L. acidoph & paracasei- S therm- Bifido (ROMI-Q/RISAQUAD) 8 billion cell cap cap; Take 1 Cap by mouth daily. -     calcium-cholecalciferol, d3, (CALCIUM 600 + D) 600-125 mg-unit tab; Take 1 Tab by mouth daily.  -     REFERRAL TO HOME HEALTH    3. Physical deconditioning  -     REFERRAL TO OPTOMETRY  -     L. acidoph & paracasei- S therm- Bifido (ROMI-Q/RISAQUAD) 8 billion cell cap cap; Take 1 Cap by mouth daily. -     calcium-cholecalciferol, d3, (CALCIUM 600 + D) 600-125 mg-unit tab;  Take 1 Tab by mouth daily.  -     REFERRAL TO [address] [place] [address]

## 2017-08-17 NOTE — PATIENT INSTRUCTIONS
Preventing Falls: Care Instructions  Your Care Instructions  Getting around your home safely can be a challenge if you have injuries or health problems that make it easy for you to fall. Loose rugs and furniture in walkways are among the dangers for many older people who have problems walking or who have poor eyesight. People who have conditions such as arthritis, osteoporosis, or dementia also have to be careful not to fall. You can make your home safer with a few simple measures. Follow-up care is a key part of your treatment and safety. Be sure to make and go to all appointments, and call your doctor if you are having problems. It's also a good idea to know your test results and keep a list of the medicines you take. How can you care for yourself at home? Taking care of yourself  · You may get dizzy if you do not drink enough water. To prevent dehydration, drink plenty of fluids, enough so that your urine is light yellow or clear like water. Choose water and other caffeine-free clear liquids. If you have kidney, heart, or liver disease and have to limit fluids, talk with your doctor before you increase the amount of fluids you drink. · Exercise regularly to improve your strength, muscle tone, and balance. Walk if you can. Swimming may be a good choice if you cannot walk easily. · Have your vision and hearing checked each year or any time you notice a change. If you have trouble seeing and hearing, you might not be able to avoid objects and could lose your balance. · Know the side effects of the medicines you take. Ask your doctor or pharmacist whether the medicines you take can affect your balance. Sleeping pills or sedatives can affect your balance. · Limit the amount of alcohol you drink. Alcohol can impair your balance and other senses. · Ask your doctor whether calluses or corns on your feet need to be removed.  If you wear loose-fitting shoes because of calluses or corns, you can lose your balance and fall. · Talk to your doctor if you have numbness in your feet. Preventing falls at home  · Remove raised doorway thresholds, throw rugs, and clutter. Repair loose carpet or raised areas in the floor. · Move furniture and electrical cords to keep them out of walking paths. · Use nonskid floor wax, and wipe up spills right away, especially on ceramic tile floors. · If you use a walker or cane, put rubber tips on it. If you use crutches, clean the bottoms of them regularly with an abrasive pad, such as steel wool. · Keep your house well lit, especially [place], and outside walkways. Use night-lights in areas such as hallways and bathrooms. Add extra light switches or use remote switches (such as switches that go on or off when you clap your hands) to make it easier to turn lights on if you have to get up during the night. · Install sturdy handrails on stairways. · Move items in your cabinets so that the things you use a lot are on the lower shelves (about waist level). · Keep a cordless phone and a flashlight with new batteries by your bed. If possible, put a phone in each of the main rooms of your house, or carry a cell phone in case you fall and cannot reach a phone. Or, you can wear a device around your neck or wrist. You push a button that sends a signal for help. · Wear low-heeled shoes that fit well and give your feet good support. Use footwear with nonskid soles. Check the heels and soles of your shoes for wear. Repair or replace worn heels or soles. · Do not wear socks without shoes on wood floors. · Walk on the grass when the sidewalks are slippery. If you live in an area that gets snow and ice in the winter, sprinkle salt on slippery steps and sidewalks. Preventing falls in the bath  · Install grab bars and nonskid mats inside and outside your shower or tub and near the toilet and sinks. · Use shower chairs and bath benches.   · Use a hand-held shower head that will allow you to sit while showering. · Get into a tub or shower by putting the weaker leg in first. Get out of a tub or shower with your strong side first.  · Repair loose toilet seats and consider installing a raised toilet seat to make getting on and off the toilet easier. · Keep your bathroom door unlocked while you are in the shower. Where can you learn more? Go to http://egrmán-rishi.info/. Enter [id number] [id number] in the search box to learn more about \"Preventing Falls: Care Instructions. \"  Current as of: August 4, 2016  Content Version: 11.3  © 9477-6677 [employer]. Care instructions adapted under license by [employer] (which disclaims liability or warranty for this information). If you have questions about a medical condition or this instruction, always ask your healthcare professional. [place] [address] any warranty or liability for your use of this information. How to Get Up Safely After a Fall: Care Instructions  Your Care Instructions  If you have injuries, health problems, or other reasons that may make it easy for you to fall at home, it is a good idea to learn how to get up safely after a fall. Learning how to get up correctly can help you avoid making an injury worse. Also, knowing what to do if you cannot get up can help you stay safe until help arrives. Follow-up care is a key part of your treatment and safety. Be sure to make and go to all appointments, and call your doctor if you are having problems. It's also a good idea to know your test results and keep a list of the medicines you take. How can you care for yourself after a fall? If you think you can get up  First lie still for a few minutes and think about how you feel. If your body feels okay and you think you can get up safely, follow the rest of the steps below:  1. Look for a chair or other piece of furniture that is close to you.   2. Roll onto your side and rest. Roll by turning your head in the direction you want to roll, move your shoulder and arm, then hip and leg in the same direction. 3. Lie still for a moment to let your blood pressure adjust.  4. Slowly push your upper body up, lift your head, and take a moment to rest.  5. Slowly get up on your hands and knees, and crawl to the chair or other stable piece of furniture. 6. Put your hands on the chair. 7. Move one foot forward, and place it flat on the floor. Your other leg should be bent with the knee on the floor. 8. Rise slowly, turn your body, and sit in the chair. Stay seated for a bit and think about how you feel. Call for help. Even if you feel okay, let someone know what happened to you. You might not know that you have a serious injury. If you cannot get up  1. If you think you are injured after a fall or you cannot get up, try not to panic. 2. Call out for help. 3. If you have a phone within reach or you have an emergency call device, use it to call for help. 4. If you do not have a phone within reach, try to slide yourself toward it. If you cannot get to the phone, try to slide toward a door or window or a place where you think you can be heard. 5. [place] or use an object to make noise so someone might hear you. 6. If you can reach something that you can use for a pillow, place it under your head. Try to stay warm by covering yourself with a blanket or clothing while you wait for help. When should you call for help? Call 911 anytime you think you may need emergency care. For example, call if:  · You passed out (lost consciousness). · You cannot get up after a fall. · You have severe pain. Call your doctor now or seek immediate medical care if:  · You have new or worse pain. · You are dizzy or lightheaded. · You hit your head. Watch closely for changes in your health, and be sure to contact your doctor if:  · You do not get better as expected. Where can you learn more?   Go to http://germán-rishi.info/. Enter Z673 in the search box to learn more about \"How to Get Up Safely After a Fall: Care Instructions. \"  Current as of: August 4, 2016  Content Version: 11.3  © 4433-4329 [employer]. Care instructions adapted under license by [employer] (which disclaims liability or warranty for this information). If you have questions about a medical condition or this instruction, always ask your healthcare professional. [place] [address] any warranty or liability for your use of this information.

## 2017-08-18 NOTE — TELEPHONE ENCOUNTER
Cover.my.meds completed and submitted for zofran as written.   Received \"no further PA action needed\"

## 2017-08-18 NOTE — TELEPHONE ENCOUNTER
Spoke to daughter HIPAA verified. Advised we are currently working on authorization. Compazine is also available but states that does not work for pt. Thanked for call.

## 2017-08-18 NOTE — TELEPHONE ENCOUNTER
Pt daughter called and wanted to know why her father only got 10 pills for nausea for a 30 day supply.  States her father has nausea daily

## 2017-08-21 NOTE — TELEPHONE ENCOUNTER
Call returned to patients daughter, HIPAA verified. Patients daughter reports patient doing much better. Much stronger, able to walk around on his own at this time. However, continues with persistent nausea, Zofran has not been effective. Accepted medications at lunchtime today, but declined feeding. Stated he would be unable to tolerate it. Would like to know if the plan is to proceed with chemotherapy on 8/23 and appointment with provider as previously discussed. Would also like to know lab values and kidney function on most recent labs as we had been monitoring this closely. Would like a return call today. Thanked for call.

## 2017-08-21 NOTE — TELEPHONE ENCOUNTER
Patient's daughter called to speak with [address]  regarding patient's medications.  Please call [phone]

## 2017-08-21 NOTE — TELEPHONE ENCOUNTER
Patient was seen and evaluated feeling weak doing well so far as good family support follow-up as needed

## 2017-08-22 NOTE — TELEPHONE ENCOUNTER
PA per pt pharmacy not valid. Call to [facility]/HIPAA verified. Auth number [id number] per Rebeca Howard. Approved. 14 minutes.

## 2017-08-22 NOTE — TELEPHONE ENCOUNTER
Call placed to daughter, HIPAA verifed. Advised of orders for Ativan, verbal order called to pharmacy on file per written order by provider. Daughter advised. Per daughter she states that they are now out of the Zofran, have run out of 10 dispensed tablets, per pharmacy, PA has not gone through and they are still getting a message that the \"max quantity has been exceeded\", number provided to call [phone]. Daughter advised to come into office tomorrow to discuss further treatment plan, will contact number provided by pharmacy to address issues with Zofran. Thanked for call.

## 2017-08-22 NOTE — TELEPHONE ENCOUNTER
Julian Stevenson from Dr Saira Peralta office called asking when should Mr. Fuentes start his treatments. . Would like a call back as soon as possible at [phone]

## 2017-08-22 NOTE — TELEPHONE ENCOUNTER
Received call from daughter, HIPAA verified. Reports that her father has been having continued nausea today, no vomiting noted. Not relieved by use of Zofran. Has tolerated feedings, but states that the nausea is worse after each feeding. States that Tramadol has been minimally effective for pain relief. Forwarded to provider to advise.

## 2017-08-23 NOTE — PROGRESS NOTES
Hematology/Oncology Consult    REASON FOR CONSULT: GE junction adenocarcinoma  REQUESTED BY: Dr. Sterling Fuentes: Mr. Carson Valentine is a 80 y.o. 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 He has had a dull epigastric abdominal pain ×6-7 months with progressive worsening. Pian does not radiate. He thought this is due to PUD and made dietary changes, without improvement. Pain was aggravated by solid food and gradually progressed to intolerance for liquids. He also reported a 20 pound weight loss over the past several months with fatigue. He was seen by GI Dr. Baljeet Duarte who performed an EGD on 6/28/17. Findings were notable for a 3-4 cm friable mass distal to the GE junction and extending down the cardia. 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 CT chest abdomen and pelvis done on 7/6/17 did not show any evidence of metastatic disease. His PET scan on 7/25/17 showed no evidence of metastatic disease. Unfortunately his tumor prevented passage of the EUS scope and thus it was not able to be completed. Quit smoking 30 years ago. No ETOH. Presents today for initiation of chemotherapy. He is getting stronger. Daughter feels that he is 80% stronger than he was first week following hospital discharge. Walking around the house without difficulty. Walking without a cane in the home. Using wheelchair today due to inability to walk long distances. Did fall in the home immediately following discharge. No recent falls. Feels stronger on his feet. Bathing himself, walking to the bathroom without difficulty. Nausea has been an issue. Hasn't eaten much x 1 week due to nausea. No improvement with Zofran or Compazine. Improvement with Ativan use yesterday. Bowels are moving without difficulty. No vomiting. Continues with tube feedings. Due to nausea unable to increase past 3 cans daily.  No pain at site. No swelling of extremities. Some abdominal discomfort, but this seems stable. No fever, chills or cough in the home. Past Medical History:   Diagnosis Date    Acid reflux     Arthritis     right  left knees    Back pain, chronic 4/11/2014    Cancer ([facility] [address] 2017    esophegeal    Decreased hearing of both ears 4/5/2017    DNR (do not resuscitate) 4/5/2017    Gastroesophageal reflux disease without esophagitis 6/13/2017    GERD (gastroesophageal reflux disease)     Hip pain, left 4/11/2014    Hypercholesterolemia     Hypertension     Need for varicella vaccine 4/11/2014    Physical deconditioning 8/17/2017    Rash of entire body 9/15/2016    Restless leg syndrome        Past Surgical History:   Procedure Laterality Date    ABDOMEN SURGERY PROC UNLISTED  1980's    gall bladder    ABDOMEN SURGERY PROC UNLISTED  15 yrs ago    right hernia repair    HX HEENT      EYE SURGERY 40 YRS AGO.  HX ORTHOPAEDIC      fluid removed both knees    HX ORTHOPAEDIC  2010    bilateral knee replacements       No Known Allergies    Current Outpatient Prescriptions   Medication Sig Dispense Refill    calcium-cholecalciferol, D3, (CALTRATE 600+D) tablet TAKE 1 TAB BY MOUTH TWO (2) TIMES A DAY. 11    VITAMIN B-12 1,000 mcg tablet TAKE 1 TABLET BY MOUTH EVERY DAY  6    lisinopril (PRINIVIL, ZESTRIL) 5 mg tablet TAKE 1 TABLET BY MOUTH DAILY  6    LORazepam (ATIVAN) 0.5 mg tablet Take 1 Tab by mouth every eight (8) hours as needed (nausea). Max Daily Amount: 1.5 mg. 30 Tab 1    ALLERGY RELIEF,CHLORPHENIRAMN, 4 mg tablet TAKE 1 TABLET BY MOUTH TWICE A DAY AS NEEDED FOR ALLERGIES OR RHINITIS  1    fluticasone (FLONASE) 50 mcg/actuation nasal spray 1 [place] by Both Nostrils route two (2) times a day.       prochlorperazine (COMPAZINE) 10 mg tablet TAKE 1 TABLET BY MOUTH 4 TIMES A DAY FOR 8 WEEKS AS NEEDED  3    L. acidoph & paracasei- S therm- Bifido (ROMI-Q/RISAQUAD) 8 billion cell cap cap Take 1 Cap by mouth daily. 30 Cap 6    calcium-cholecalciferol, d3, (CALCIUM 600 + D) 600-125 mg-unit tab Take 1 Tab by mouth daily. 30 Tab 7    cyanocobalamin (VITAMIN B-12) 1,000 mcg tablet Take 1 Tab by mouth daily. 30 Tab 6    ondansetron hcl (ZOFRAN) 8 mg tablet Take 1 Tab by mouth every eight (8) hours as needed for Nausea. 30 Tab 5    pantoprazole (PROTONIX) 40 mg tablet Take 1 Tab by mouth daily. Gastric cancer, hx bleeding 30 Tab 0    sucralfate (CARAFATE) 100 mg/mL suspension Take 10 mL by mouth four (4) times daily as needed. 500 mL 0    raNITIdine (ZANTAC) 150 mg tablet Take 1 Tab by mouth two (2) times daily as needed for Indigestion. (Patient taking differently: Take  by mouth daily.) 20 Tab 0    traMADol (ULTRAM) 50 mg tablet Take 50 mg by mouth every eight (8) hours as needed for Pain.  lisinopril (PRINIVIL, ZESTRIL) 10 mg tablet Take 5 mg by mouth daily.  aspirin delayed-release 81 mg tablet Take 81 mg by mouth daily.  loratadine (CLARITIN) 10 mg tablet Take 10 mg by mouth daily.  finasteride (PROSCAR) 5 mg tablet Take 5 mg by mouth daily.  tamsulosin (FLOMAX) 0.4 mg capsule Take 0.8 mg by mouth daily.  gabapentin (NEURONTIN) 800 mg tablet Take  by mouth three (3) times daily.        Facility-Administered Medications Ordered in Other Visits   Medication Dose Route Frequency Provider Last Rate Last Dose    sodium chloride (NS) flush 5-10 mL  5-10 mL IntraVENous PRN Telma Nichols MD        sodium chloride 0.9 % injection 10 mL  10 mL IntraVENous PRN Laurel Pickett MD        heparin (porcine) pf 500 Units  500 Units IntraVENous PRN Laurel Pickett MD        0.9% sodium chloride infusion  25 mL/hr IntraVENous CONTINUOUS Telma Nichols MD        palonosetron HCl (ALOXI) injection 0.25 mg  0.25 mg IntraVENous ONCE Laurel Pickett MD        dexamethasone (DECADRON) 4 mg/mL injection 12 mg  12 mg IntraVENous ONCE Telma Nichols MD        famotidine (PF) (PEPCID) 20 mg in sodium chloride 0.9 % 10 mL injection  20 mg IntraVENous ONCE Telma Nichols MD        diphenhydrAMINE (BENADRYL) injection 25 mg  25 mg IntraVENous ONCE Telma Nichols MD        PACLitaxel (TAXOL) 100 mg in 0.9% sodium chloride 250 mL, overfill volume 25 mL chemo infusion  100 mg IntraVENous ONCE Bon Keller MD        CARBOplatin (PARAPLATIN) 125 mg in 0.9% sodium chloride 250 mL, overfill volume 25 mL chemo infusion  125 mg IntraVENous ONCE Bon Keller MD           Social History     Social History    Marital status:      Spouse name: N/A    Number of children: N/A    Years of education: N/A     Social History Main Topics    Smoking status: Former Smoker     Years: 0.00     Quit date: 4/28/1970    Smokeless tobacco: Never Used    Alcohol use Yes      Comment: OCCASSIONALLY    Drug use: No    Sexual activity: Not Currently     Other Topics Concern    None     Social History Narrative       Family History   Problem Relation Age of Onset    Heart Disease Sister        ROS  A 12 point review of systems was obtained and is negative except as listed in HPI.   ECOG PS is 2    Physical Examination:   Visit Vitals    /66    Pulse 90    Temp 97.5 °F (36.4 °C)    Resp 16    Ht 6' (1.829 m)    Wt 179 lb 9.6 oz (81.5 kg)    SpO2 98%    BMI 24.36 kg/m2     General appearance -chronically ill appearing, and in no acute distress  Mental status - oriented to person, place, and time  Mouth - mucous membranes dry  Neck - supple, no significant adenopathy  Chest - clear to auscultation, no wheezes, rales or rhonchi, symmetric air entry  Heart - normal rate, regular rhythm  Abdomen - soft,tender epigastrium, PEG tube in place  Neurological - normal speech, no focal findings or movement disorder noted, gait not assessed  Musculoskeletal - no joint tenderness, deformity or swelling  Extremities - peripheral pulses normal, no pedal edema, no clubbing or cyanosis  Skin - normal coloration and turgor, no rashes, no suspicious skin lesions noted, bruising around left eye. LABS  Lab Results   Component Value Date/Time    WBC 14.9 08/11/2017 03:39 AM    HGB 9.6 08/11/2017 03:39 AM    HCT 27.2 08/11/2017 03:39 AM    PLATELET 467 01/67/3729 03:39 AM    MCV 90.7 08/11/2017 03:39 AM    ABS. NEUTROPHILS 10.7 08/09/2017 03:50 AM     Lab Results   Component Value Date/Time    Sodium 139 08/11/2017 03:39 AM    Potassium 3.2 08/11/2017 03:39 AM    Chloride 104 08/11/2017 03:39 AM    CO2 27 08/11/2017 03:39 AM    Glucose 106 08/11/2017 03:39 AM    BUN 32 08/11/2017 03:39 AM    Creatinine 1.57 08/11/2017 03:39 AM    GFR est AA 51 08/11/2017 03:39 AM    GFR est non-AA 42 08/11/2017 03:39 AM    Calcium 8.6 08/11/2017 03:39 AM    BUN (POC) 25 08/02/2017 08:19 AM    Calcium, ionized (POC) 1.20 08/02/2017 08:19 AM     Lab Results   Component Value Date/Time    AST (SGOT) 37 08/08/2017 04:15 PM    Alk. phosphatase 146 08/08/2017 04:15 PM    Protein, total 6.8 08/08/2017 04:15 PM    Albumin 3.2 08/08/2017 04:15 PM    Globulin 3.6 08/08/2017 04:15 PM    A-G Ratio 0.9 08/08/2017 04:15 PM       PATHOLOGY     FINAL PATHOLOGIC DIAGNOSIS   1. Stomach, antrum and body, biopsy   Antral-type mucosa with intestinal metaplasia and mild chronic superficial gastritis   No H. pylori organisms identified by H&E   2. 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 No PET avid  evidence of metastatic disease.     ASSESSMENT  Mr. Yoko Whitehead is a 80 y.o. male with  1. 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 EUS could not be completed due to mechanical interference from the mass hence accurate staging is not available. 2. Dysphagia  3. Abdominal pain  4.  CKD Stage III    DISCUSSION  Reviewed pathology and staging. He has declined curative surgery, given his age, that is a reasonable decision. We discussed chemoradiation with the goal of improving both local and systemic control of disease. Went over treatment per the phase III CROSS trial which compared outcomes for potentially resectable GEJ Ca with chemoXRT vs Sx. Chemotherapy used in this study was weekly Carboplatin and Taxol. At a median follow-up of 32 months, overall survival was significantly better with preoperative chemoradiotherapy (HR for death 0.657, 95% CI 0.495-0.871, three-year survival rate 58 versus 44 percent). The survival benefit persisted with longer (median 84 month) follow-up (five-year survival 47 versus 33 percent, HR for death 0.67, 95% CI 0.51-0.87    PLAN  1. GE junction cancer. Baseline VS, labs and weight stable. Reviewed schedule of therapy, duration, anticipated side effects and side effect management in detail. Given written and verbal instruction regarding management of nausea, vomiting, diarrhea or constipation in the home. Encouraged him to call for the office for any uncontrolled symptoms or fever/chills. Given office phone number and advised of availability of on call provider. Consent obtained. Proceed with Carbo (AUC 2) and Paclitaxel (50mg/m2) weekly as ordered today. Concerned about his overall frailty. Even though his strength is improved post hospitalization he remains weak overall. Will start with cycle 1 of treatment today. Radiation to begin tomorrow. If chemotherapy not well tolerated will proceed with radiation therapy alone. Give additional fluids in infusion today. May need to add additional days of hydration if he is not able to get in enough fluids by mouth/PEG    2. Nausea. Due to cancer. Has tried Zofran and Compazine without relief. Ativan initiated yesterday with improvement of symptoms.  Counseled on fall precautions in the home with medications that can induce drowsiness. Add Dexamethasone for 2 days post chemotherapy. 3. Dysphagia. PEG tube for nutrition. Taking in average 3 cans formula daily. Discussed increasing hydration via PEG tube. Will ask dietician to follow up as well. 4. Generalized weakness. PT/OT in the home. Use of cane with ambulation. Wheelchair for transport to/from doctors visits. Follow up in 2 weeks for evaluation of therapy.    Patient was seen with Aubrie Cabrera MD

## 2017-08-23 NOTE — MR AVS SNAPSHOT
Visit Information Date & Time Provider Department Dept. Phone Encounter #  
 8/23/2017 11:30 AM Junie Luis NP [facility] MIGUEL Oncology at [facility] [id number] Your Appointments 12/13/2017  2:45 PM  
ROUTINE CARE with Radha Guillen MD  
DEPARTMENT [facility] OFFICE-ANNEX ([facility]) Appt Note: follow up [address] [place] [phone] [facility] [phone] Upcoming Health Maintenance Date Due  
 GLAUCOMA SCREENING Q2Y 3/13/2016 INFLUENZA AGE 9 TO ADULT 8/1/2017 MEDICARE YEARLY EXAM 4/6/2018 DTaP/Tdap/Td series (2 - Td) 4/11/2024 Allergies as of 8/23/2017  Review Complete On: 8/23/2017 By: Junie Luis NP No Known Allergies Current Immunizations  Reviewed on 8/23/2017 Name Date Influenza High Dose Vaccine PF 9/15/2016, 12/7/2015 Influenza Vaccine 4/11/2014 Influenza Vaccine PF 10/9/2013 Pneumococcal Conjugate (PCV-13) 8/6/2015 Pneumococcal Polysaccharide (PPSV-23) 4/11/2014 Tdap 4/11/2014 Reviewed by Dylan Mariscal RN on 8/23/2017 at 12:26 PM  
You Were Diagnosed With   
  
 Codes Comments GE junction carcinoma ([facility] [address]    -  Primary ICD-10-CM: C16.0 ICD-9-CM: 151.0 Pain     ICD-10-CM: R52 ICD-9-CM: 780.96 Dysphasia     ICD-10-CM: R47.02 
ICD-9-CM: 784.59 Physical deconditioning     ICD-10-CM: R53.81 ICD-9-CM: 799.3 Vitals BP Pulse Temp Resp Height(growth percentile) Weight(growth percentile) 102/66 90 97.5 °F (36.4 °C) 16 6' (1.829 m) 179 lb 9.6 oz (81.5 kg) SpO2 BMI Smoking Status 98% 24.36 kg/m2 Former Smoker Vitals History BMI and BSA Data Body Mass Index Body Surface Area  
 24.36 kg/m 2 2.03 m 2 Preferred Pharmacy Pharmacy Name Phone  [id number] AT [facility] [phone] Your Updated Medication List  
  
   
This list is accurate as of: 8/23/17  1:00 PM.  Always use your most recent med list.  
  
  
  
  
 ALLERGY RELIEF(CHLORPHENIRAMN) 4 mg tablet Generic drug:  chlorpheniramine TAKE 1 TABLET BY MOUTH TWICE A DAY AS NEEDED FOR ALLERGIES OR RHINITIS  
  
 aspirin delayed-release 81 mg tablet Take 81 mg by mouth daily. * calcium-cholecalciferol (D3) tablet Commonly known as:  CALTRATE 600+D TAKE 1 TAB BY MOUTH TWO (2) TIMES A DAY. * calcium-cholecalciferol (d3) 600-125 mg-unit Tab Commonly known as:  CALCIUM 600 + D Take 1 Tab by mouth daily. CLARITIN 10 mg tablet Generic drug:  loratadine Take 10 mg by mouth daily. * VITAMIN B-12 1,000 mcg tablet Generic drug:  cyanocobalamin ER  
TAKE 1 TABLET BY MOUTH EVERY DAY  
  
 * cyanocobalamin 1,000 mcg tablet Commonly known as:  VITAMIN B-12 Take 1 Tab by mouth daily. dexamethasone 4 mg tablet Commonly known as:  DECADRON Take 2 tabs for 2 days after each chemotherapy on Thursday and Friday  
  
 fluticasone 50 mcg/actuation nasal spray Commonly known as:  FLONASE  
1 [place] by Both Nostrils route two (2) times a day. L. acidoph & paracasei- S therm- Bifido 8 billion cell Cap cap Commonly known as:  ROMI-Q/RISAQUAD Take 1 Cap by mouth daily. * lisinopril 10 mg tablet Commonly known as:  Vern Joanna Take 5 mg by mouth daily. * lisinopril 5 mg tablet Commonly known as:  PRINIVIL, ZESTRIL  
TAKE 1 TABLET BY MOUTH DAILY LORazepam 0.5 mg tablet Commonly known as:  ATIVAN Take 1 Tab by mouth every eight (8) hours as needed (nausea). Max Daily Amount: 1.5 mg.  
  
 NEURONTIN 800 mg tablet Generic drug:  gabapentin Take  by mouth three (3) times daily. ondansetron hcl 8 mg tablet Commonly known as:  Ranny Kanaris Take 1 Tab by mouth every eight (8) hours as needed for Nausea. pantoprazole 40 mg tablet Commonly known as:  PROTONIX Take 1 Tab by mouth daily. Gastric cancer, hx bleeding  
  
 prochlorperazine 10 mg tablet Commonly known as:  COMPAZINE  
TAKE 1 TABLET BY MOUTH 4 TIMES A DAY FOR 8 WEEKS AS NEEDED  
  
 PROSCAR 5 mg tablet Generic drug:  finasteride Take 5 mg by mouth daily. raNITIdine 150 mg tablet Commonly known as:  ZANTAC Take 1 Tab by mouth two (2) times daily as needed for Indigestion. sucralfate 100 mg/mL suspension Commonly known as:  Leitha Chiki Take 10 mL by mouth four (4) times daily as needed. tamsulosin 0.4 mg capsule Commonly known as:  FLOMAX Take 0.8 mg by mouth daily. traMADol 50 mg tablet Commonly known as:  ULTRAM  
Take 50 mg by mouth every eight (8) hours as needed for Pain. * Notice: This list has 6 medication(s) that are the same as other medications prescribed for you. Read the directions carefully, and ask your doctor or other care provider to review them with you. Prescriptions Sent to Pharmacy Refills  
 dexamethasone (DECADRON) 4 mg tablet 2 Sig: Take 2 tabs for 2 days after each chemotherapy on Thursday and Friday Class: Normal  
 Pharmacy: [address] Ph #: [phone] To-Do List   
 08/30/2017 11:00 AM  
  Appointment with [facility] at [address] ([phone])  
  
 09/06/2017 11:00 AM  
  Appointment with [facility] at [address] ([phone])  
  
 09/13/2017 10:00 AM  
  Appointment with [facility] at [address] ([phone]) [facility]! Dear Glenna Villanueva: Thank you for requesting a [employer] account. Our records indicate that you already have an active [employer] account. You can access your account anytime at [email or web address] Did you know that you can access your hospital and ER discharge instructions at any time in [employer]? You can also review all of your test results from your hospital stay or ER visit. Additional Information If you have questions, please visit the Frequently Asked Questions section of the [employer] website at [email or web address] Remember, [employer] is NOT to be used for urgent needs. For medical emergencies, dial 911. Now available from your iPhone and Android! Please provide this summary of care documentation to your next provider. Your primary care clinician is listed as Wes Mendenhall. If you have any questions after today's visit, please call [phone].

## 2017-08-23 NOTE — TELEPHONE ENCOUNTER
Afia Pillai called back to inform Devi Romo pt can not start Radiation until Monday, if any questions please contact her at [phone]

## 2017-08-23 NOTE — PROGRESS NOTES
36 Pt admit to [facility] for C1 Taxol/Carboplatin wheelchair in stable condition. Assessment completed. No new concerns voiced. Port accessed and flushed with positive blood return. Labs drawn per order and sent for processing. 1145 patient to MD office. 1305 patient returned to \[facility]\"", Normal Saline started at [facility]. Visit Vitals    /78    Pulse 86    Temp 97 °F (36.1 °C)    Resp 18    Ht 6' (1.829 m)    Wt 63.4 kg (139 lb 12.8 oz)    SpO2 98%    BMI 18.96 kg/m2       Medications:  Normal Saline KVO  Normal Saline 1000ml  Aloxi IV Push  Decadron IV Push  Pepcid IV Push  Benadryl IV Push  Taxol  Carboplatin   Heparin Flush    1820 Pt tolerated treatment well. Patient observed for30 minutes post infusion. Port maintained positive blood return throughout treatment, flushed with positive blood return at conclusion, port heparinized and de-accessed per protocol. D/c home ambulatory in no distress. Pt aware of next appointment scheduled for 8/30/17. Recent Results (from the past 12 hour(s))   CBC WITH AUTOMATED DIFF    Collection Time: 08/23/17 11:37 AM   Result Value Ref Range    WBC 9.2 4.1 - 11.1 K/uL    RBC 3.21 (L) 4.10 - 5.70 M/uL    HGB 10.1 (L) 12.1 - 17.0 g/dL    HCT 30.0 (L) 36.6 - 50.3 %    MCV 93.5 80.0 - 99.0 FL    MCH 31.5 26.0 - 34.0 PG    MCHC 33.7 30.0 - 36.5 g/dL    RDW 13.1 11.5 - 14.5 %    PLATELET 635 (H) 887 - 400 K/uL    NEUTROPHILS 80 (H) 32 - 75 %    LYMPHOCYTES 9 (L) 12 - 49 %    MONOCYTES 8 5 - 13 %    EOSINOPHILS 3 0 - 7 %    BASOPHILS 0 0 - 1 %    ABS. NEUTROPHILS 7.4 1.8 - 8.0 K/UL    ABS. LYMPHOCYTES 0.8 0.8 - 3.5 K/UL    ABS. MONOCYTES 0.7 0.0 - 1.0 K/UL    ABS. EOSINOPHILS 0.3 0.0 - 0.4 K/UL    ABS.  BASOPHILS 0.0 0.0 - 0.1 K/UL    DF SMEAR SCANNED      PLATELET COMMENTS CLUMPED PLATELETS      RBC COMMENTS ANISOCYTOSIS  1+       METABOLIC PANEL, BASIC    Collection Time: 08/23/17 11:37 AM   Result Value Ref Range    Sodium 136 136 - 145 mmol/L    Potassium 4.1 3.5 - 5.1 mmol/L    Chloride 99 97 - 108 mmol/L    CO2 30 21 - 32 mmol/L    Anion gap 7 5 - 15 mmol/L    Glucose 84 65 - 100 mg/dL    BUN 20 6 - 20 MG/DL    Creatinine 1.41 (H) 0.70 - 1.30 MG/DL    BUN/Creatinine ratio 14 12 - 20      GFR est AA 58 (L) >60 ml/min/1.73m2    GFR est non-AA 48 (L) >60 ml/min/1.73m2    Calcium 9.1 8.5 - 10.1 MG/DL   HEPATIC FUNCTION PANEL    Collection Time: 08/23/17 11:37 AM   Result Value Ref Range    Protein, total 6.4 6.4 - 8.2 g/dL    Albumin 2.9 (L) 3.5 - 5.0 g/dL    Globulin 3.5 2.0 - 4.0 g/dL    A-G Ratio 0.8 (L) 1.1 - 2.2      Bilirubin, total 0.4 0.2 - 1.0 MG/DL    Bilirubin, direct 0.1 0.0 - 0.2 MG/DL    Alk.  phosphatase 123 (H) 45 - 117 U/L    AST (SGOT) 29 15 - 37 U/L    ALT (SGPT) 48 12 - 78 U/L

## 2017-08-23 NOTE — TELEPHONE ENCOUNTER
Received call from Dr. Dandre Ko office, Ami Console, attempting to coordinate patients radiation with our office. Per Ami Console patient had simulation on 8/17. Can be scheduled to start radiation on 8/24 if ok with Dr. Lianna Hilton and start of chemotherapy, would just need the go ahead from our office.

## 2017-08-24 NOTE — TELEPHONE ENCOUNTER
Pt daughter, Royal Perez and son walk in to clinic to discuss father. Pt currently in radiation. C1 paclitaxel/carboplatin in conjunction with radiation started yesterday. Questionable baseline dementia. During treatment patient had thought daughter was wife (). Redirected and stated he knew she was daughter. Last night, patient continued to confuse daughter identity, was quite restless, \"confused at where he was\", mindless activity, did not sleep, minimal eating and minimal drinking. Incontinent with depends. PEG in use as prescribed. Feedings and flushing as ordered without problems. Will advise RD for review. Daughter is admittedly exhausted in caring for her father, not sure she can continue. Son is just now getting [place] approved. And is more available for assistance. They are questioning if they should place him a facility or gain more care in the home. Discussed conversation of yesterday with MD including reassessing after C1 if they should continue chemotherapy. Palliative radiation should continue. Navigation assistance requested. Pt has struggled with nausea in past prior to treatment start. Per provider, compazine will be discontinued for now. Zofran as needed. Ativan reduce to 1/2 tab (0.25 mg) q8h as needed. Continue with decadron as ordered. Hydration scheduled today at [facility]. Daughter to call tomorrow to advise of progress.

## 2017-08-24 NOTE — PROGRESS NOTES
Call from RN at [facility]. Patient is there receiving IV fluids today. Son has noticed some changes in mentation. Patient seems delirious. He is oriented to person and place. Saying things that don't make sense in conversation. Will add BMP and Mg to infusion orders. Hold Ativan. Make sure not taking Compazine. Continue Zofran for nausea only PRN. If symptoms not improved and labs are stable with no correctable electrolyte abnormalities will proceed to ED for evaluation.  Will also check UA

## 2017-08-24 NOTE — PROGRESS NOTES
Outpatient Infusion Center Progress Note    1430- Pt admit to [facility] for Hydration ambulatory with cane in stable condition. Assessment completed. Pt c/o nausea but denies any vomiting episodes. R chest port accessed with positive blood return noted. Patient Vitals for the past 8 hrs:   Temp Pulse Resp BP SpO2   08/24/17 1710 98 °F (36.7 °C) 81 18 124/68 96 %   08/24/17 1433 97.5 °F (36.4 °C) (!) 102 18 129/71 -         Medications:  Normal Saline 1 L IV over 90 mins    1600- Pt's son concerned that pt is becoming increasingly more delirious while here. Pt is alert and oriented to person and place (knows he is in [place], [place] but thinks he is at infusion center to \"pump gas\"). Pt is able to tell me his birthday and age. Son is unsure what medications pt received earlier today but thinks he received Ativan and Compazine this AM.   Called THOR Jones NP at MD office and made her aware of situation. Orders received to draw BMP and Mag. Multiple failed attempts made to collect UA at this time d/t patient having used restroom earlier and wearing incontinent briefs. Per NP, pt is to also stop taking Ativan and Compazine. OK to continue with Zofran PRN for nausea. Recent Results (from the past 12 hour(s))   METABOLIC PANEL, BASIC    Collection Time: 08/24/17  4:13 PM   Result Value Ref Range    Sodium 135 (L) 136 - 145 mmol/L    Potassium 4.3 3.5 - 5.1 mmol/L    Chloride 103 97 - 108 mmol/L    CO2 26 21 - 32 mmol/L    Anion gap 6 5 - 15 mmol/L    Glucose 104 (H) 65 - 100 mg/dL    BUN 24 (H) 6 - 20 MG/DL    Creatinine 1.32 (H) 0.70 - 1.30 MG/DL    BUN/Creatinine ratio 18 12 - 20      GFR est AA >60 >60 ml/min/1.73m2    GFR est non-AA 51 (L) >60 ml/min/1.73m2    Calcium 8.2 (L) 8.5 - 10.1 MG/DL   MAGNESIUM    Collection Time: 08/24/17  4:13 PM   Result Value Ref Range    Magnesium 2.0 1.6 - 2.4 mg/dL     1700- Pt reports feeling better and nausea resolving. Patient is making more sense during conversation.  Advised pt and son to go to ED if mental status changes reoccur tonight. Verbalized understanding. Pt's son called pt's daughter and put her on speaker phone to speak with RN about plan of care. Pt's daughter upset that pt can no longer take Ativan and Compazine b/c she states Zofran \"doesn't work\" for pt's nausea. Daughter requesting that RN call MD office to see about other anti-nausea medications. Tried to call MD office again but office closed. Recommended that she contact MD on call if pt's nausea returns tonight with no relief from Zofran. [facility] flushed and de-accessed. Gauze and bandaid placed on site. D/c home ambulatory in no distress accompanied by son.  Pt aware of next appointment scheduled for 8/30 at 11 AM.

## 2017-08-25 NOTE — PROGRESS NOTES
[facility]     Medical Nutrition Therapy   [phone]    Nutrition    Met with daughter this morning while Mr. Fuentes received radiation treatment. He received his first cycle of chemotherapy on Wednesday. She reports after treatment, he became confused talking about his  wife and friends. He got fluids on Thursday and she felt he was lucid after fluids. Thursday night, slept from 7pm to 5am, again was lucid. Daughter gave him a bolus feed and medications, then patient laid down again till radiation treatment. It was recommended that Mr. Fuentes be NPO for 4 hours prior to radiation treatment in hopes of keeping size of the stomach similar to size at simulation appointment. His radiation is scheduled for 11am, which makes getting 5 cans of TwoCal HN per day difficult. He has also been struggling with nausea with the tube feeds. Current TF Regimen  Mr. Fuentes daughter reports he is averaging 3 cans per day with 100ml water before and after each can. Additional 100-200mll water given with medications 3 times per day. Estimated Nutrition Needs:   Kcals/day: 2794 Kcals/day (1653-0354 kcal/day (30-32 kcal/kg))  Protein: 99 g (1.2 g/kg -- adj per renal function)  Fluid: 2472 ml (1 mL/kcal)  Based On: Kcal/kg - specify (Comment)  Weight Used: Actual wt (82.4 kg)      Plan:  Given nausea, will initiate use of a feeding pump and allow feeds to run overnight. Daughter asks about hospital bed and beside commode, message sent to MD.   Discussed that he can receive 100% of tube feeds overnight or depending on nausea, he could receive 1 can during the day. Kasia Lorenzo RD, CNSC,        FEEDING TUBE SCHEDULE*    Name____Bandar Fuentes_______________      Date____17____      Liquid Formula: TwoCal HN     Regimen: Feeding pump + bolus feed  Amount:  5 cans per day        SCHEDULE        Daytime:     - Give 1 can of Two Rubens HN during the day.  Give 100ml water flush before and after       Evening/nighttime:    - Water flushes - 100ml every 2 hours while tube feeds are running. Program pump. Day 1:   Begin tube feeds at 50ml/hr over 4 hours, then increase rate to 75ml/hr x 6-8 hours. Run for 10-12hours. Water flushes are 100ml every 2 hours while tube feeds are running. Day 2:   Begin tube feeds at 75ml/hr x 6 hours, increase rate to 100ml/hr x 6 hours. Water flushes are 100ml every 2 hours while feeds are running. Day 3 and on: rate options  Run over 12 hours, pump will rate at 83ml/hour   Run over 10 hours, pump will rate at 100ml/hour    Run over 8 hours, pump will rate at 125mlml/hour         *Regimen subject to change as needed.      CONTACT INFORMATION    Doctor: Dr. Norberto Harris     Phone: [phone]  Dietitian: Spencer Boo, [address]   Phone:  [phone]  Email: [email or web address]

## 2017-08-25 NOTE — PROGRESS NOTES
ONCOLOGY NURSE NAVIGATOR    Appreciate referral from Sandra Proctor RN, in Radiation Oncology--expressed concern about caregiver burden. Chart reviewed and in brief, Mr. 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 Gtube was placed and he and daughter discussed and consented to palliative chemoradiation (carboplatin/taxol) which commenced 8/24. Mr. Yumiko Malin has struggled with weight loss, deconditioning with history of falls, and nausea prior to diagnosis and starting treatment. When he was seen in oncology provider's office on 8/23, it was reported that he was stronger, ambulatory with minimal assistance up and around in the home and had not had a recent fall. His nausea was controlled and he was tolerating TF without problem. When I arrived in Radiation Oncology, Mr. Gayle Owusu caregivers, daughter, Aileen Monet and son, Nubia Hernández, were in Dr. Suzanne Driscoll office. I met with Mr. Yumiko Malin who had completed treatment. He was pleasantly confused. Oriented to person and [facility]; unable to clearly tell me why he's here. Talked to me about need to have toenails clipped (\"I can't do this myself\"), Trudee Colt' death, and catholicism. Speech is tangential.  He can be redirected but can't focus attention for long. When his children arrived, he seemed surprised to see them. I introduced myself and described ONN role. Our visit was focused on urgent concerns and brief due to scheduled appt at [facility] for IVF. Mr. Yumiko Malin is a  and has recently moved in with daughter, Aileen Monet, in her home in [place]. She identifies herself as a fatigued caregiver -- no sleep for past 2 nights. Says dad has been agitated during the nights. Needs constant watching for safety. With direction on 'good days,' he can shower and perform self care/hygiene. Has been incontinent; using Depends. Aileen Monet is disabled, not employed.   Son, Nubia Hernández, is employed by [employer] and has requested FMLA to assist Mami with caregiving. He lives in town but not close to [facility] -- is present in the home as much as he can be. A friend, who is wheelchair bound, has recently moved in to [address]  -- he cannot assist with physical care, but \"can let me know when dad needs help. \"  She identifies this as a big help. She states she cannot leave dad unattended at home. Unable to shop or go to personal appts unless Chey May can relieve her. Mora and Chey May state no problems with delivering TF or care of Gtube. Seek clarification about TF schedule as they've been told dad should be NPO 4 hours prior to daily RT. They feel dad is dehydrated and wonder how much fluids they can give him? Gator aid? Pedialyte? They are happy he will receive IVF today. They note he acts better when he is hydrated. They are thoughtful and have good insights and questions. Uncertain they can meet caregiving needs. Romeo and Mami want to plan for possible placement for dad although this is not their desire. Dad has medicare and [facility]. Chey May intends to contact [employer] to research coverage. He is a . They are unsure if he qualifies for VA benefits. We agreed on the following plan:  1. Caregiving burden:   A. Discussed home health assistance. -  HH PT is ordered. No home visit yet.  needs to help us eval home needs. -  I phoned [facility] and spoke with Betty (rehab and nursing schedule @ [phone]) -- expedite PT visit ASA this weekend. PT to call me to discuss eval.   -  Betty suggests ordering Skilled nursing and SW visits following initial PT visit    B. Chey May to start FMLA as soon as possible for caregiver respite for Madisyn Doyle to be present with dad so Mami can sleep    C. Family to id insurance resources. Explore possible VA eligibility. Ray Morlye will continue to eval caregiving barriers and direct to resources   E. Ensure patient and caregiver safety.   Can call oncology provider 24/7 for concerns/problems   F. Assured Romeo and Mami that assessing benefit and burden of continuing any or all treatment with oncology providers will continue. 2.  Nutrition/hydration   A. Referral to Tessa Perez RD, to address TF needs and schedule -- discussed assessed concerns with Purnima Crook. She will meet with patient and family ASAP. B.  IVF today at [facility]; eval need for scheduled IVF     3. ONN support to eval and support will continue. They know how to reach me. Will discuss with SHIREEN Gannon and with the navigator in PCP office. Noted AMD is on file.     Srini Marin MS RN AOCNS

## 2017-08-25 NOTE — TELEPHONE ENCOUNTER
Call placed to [facility], spoke with [employer]. Script sent for hospital bed, faxed complete to her attention. To PSR for scanning.

## 2017-08-28 NOTE — PROGRESS NOTES
ONCOLOGY NURSE NAVIGATOR    Supportive visit with Patel Nunez and daughter, Sangita Delacruz, today in [address] remains pleasantly confused and amiable -- Mami says this is pretty close to his mental baseline. She notes that his lucidity waxes and wanes but she says he's not delusional like he had been. His mental status is much improved following IVF and when he's hydrated. He continues to need constant supervision. No recent falls. Naps a lot at home. Not much stamina but he's moving more in the past several days than he was previously. He's mobile in home with a cane. Uses a wheelchair for transport in hospital and rad onc. Mami is \"bird bathing\" her dad. He is incontinent of urine and sometimes stool, prasad during naps. Wears Depends. Can get to bathroom for toileting sometimes. No trouble with transportation. Son, Armen Spear, is working to commence [employer] so he can be more available for caregiving. Mami feels a little better rested. Her friend, Gabo Rodriguez, lives in the home and can help her watch Patel Nunez. Nausea is better controlled, per Mami, on current regimen of zofran and ativan. Tolerating TF. Will start TF per pump when hospital bed arrives (need to keep [facility] elevated 30 degrees). Bed has been ordered. Pump due to arrive tomorrow. ONN actions/plan:    1. Support to [address] communication between multidisciplines, including HH. Sangita Delacruz wants to try but uncertain she and brother can continue to mount care at home that dad needs, prasad if his needs increase. 2.  Care needs  Spoke with ROGERIO [facility] PT ([phone])  Clarified goal of care:  Palliative. Agreed to stay in touch about Bandar's needs and ability to meet needs in home. Hospital bed ordered and she will communicate with Sangita Delacruz about delivery. Mami identifies need for BSC at night. Will order OT and SW assessments -- aim to see and eval Patel Nunez this week.     OT to determine needs for addt'l DME and if aide can be of help but these services will have an end. SW to eval resources for care -- is he eligible for VA benefits? Long term care planning needed with family. He has an AMD and appointed mPOA.      3.  Nutrition/hydration  Nutrition per [place] Men, RD who is following   Needs order for IV hydration     Will follow    Paola Acevedo MS RN AOCNS

## 2017-08-28 NOTE — PROGRESS NOTES
ONCOLOGY NURSE NAVIGATOR    Appreciate detailed VM received by [facility] PT Tushar Mena -- she saw and eval Mr. Fuentes at home on 8/27. Have returned her call to discuss DME needs and orders needed for [facility] SW, personal aide and OT. I can see that req/order for hospital bed has been faxed by Dr. Veena Conley office today. Plan to see Mr. Fuentes and caregivers today in [address] [place] [address] [place] at 215pm.    Melissa Mandel MS RN AOCNS

## 2017-08-29 NOTE — TELEPHONE ENCOUNTER
[address] [facility] Nutrition Therapy   [phone]    Nutrition    Feeding pump was delivered today by Vitor Sandy and Mami was instructed on how to use the pump. Called and spoke with Mami.  She did not have any questions about the feeding pump at the moment, she felt she would be able to program the pump without difficulty. She reports that [facility] sent referral for hospital bed and bedside commode to [facility]ie [facility]. Mami reports that the hospital bed would take 5 days for delivery and currently did not have any bedside commodes in stock. Mami reports she feels like she can't start the tube feeds overnight until he has a hospital bed and able to be at a 30 degree angle. Recommendations:   - Suggested pillows or sofa cushions, but she feels this will not be enough. - Suggested to start feeds at least for a few hours while sitting on the sofa or recliner     Called [facility] ([phone]) to clarify timeframe. She reports standard business delivery is 5-7 days for hospital bed. And the local office did not have any bedside commodes in stock. And Mami cancelled the order for bedside commode. [facility] and spoke with [employer]. Explained that bedside commode was not in stock and she will attempt to submit to another DME company but is limited due to his insurance. Otherwise Mami will need to call [facility] back and ask for bedside commode to be ordered. Plan:   - Encouraged Mami to start tube feeds via pump. - Continue with bolus feeds and water flushes   - Pillow/sofa cushions to help elevate bed until hospital bed delivered. - [facility] to make another referral to different company for bedside commode, otherwise daughter will need to call [facility] back and reorder.          Nikolay Chávez, [address], [address] [place] [address] , [address]

## 2017-08-30 PROBLEM — T45.1X5A CHEMOTHERAPY-INDUCED NAUSEA: Status: ACTIVE | Noted: 2017-01-01

## 2017-08-30 PROBLEM — G89.3 PAIN, CANCER: Status: ACTIVE | Noted: 2017-01-01

## 2017-08-30 PROBLEM — R11.0 CHEMOTHERAPY-INDUCED NAUSEA: Status: ACTIVE | Noted: 2017-01-01

## 2017-08-30 PROBLEM — R41.82 ALTERED MENTAL STATUS: Status: ACTIVE | Noted: 2017-01-01

## 2017-08-30 NOTE — TELEPHONE ENCOUNTER
Call placed to patients daughter, message left to return call. [place] documents for patients son complete. Signed by provider. Faxed complete to [employer] [facility]. Available for  in office. To [employer] for scanning.

## 2017-08-30 NOTE — TELEPHONE ENCOUNTER
Call placed to patients son, made aware that FMLA documentation complete and that information had been faxed to [employer] [facility] [place]. Son requested forms be faxed to him at secure fax, [phone]. Faxed complete.

## 2017-08-30 NOTE — IP AVS SNAPSHOT
[address] 
[phone] Patient: Bladimir Solorzano Sr. MRN: [id number] MUM:[birth year] You are allergic to the following No active allergies Recent Documentation Weight BMI Smoking Status 75.4 kg 22.54 kg/m2 Former Smoker Emergency Contacts  (Rel.) Home Phone Work Phone Mobile Phone Ion He (Daughter) [phone] -- [phone] Ion He (Child) [phone] -- -- About your hospitalization You were admitted on:  August 31, 2017 You last received care in the:  [facility] You were discharged on:  September 7, 2017 Why you were hospitalized Your primary diagnosis was: Altered Mental Status Providers Seen During Your Hospitalizations Provider Role Specialty Primary office phone Pastora Maria MD Attending Provider Emergency Medicine [phone] Helga Vega MD Attending Provider [facility] [phone] Gretel Yusuf MD Attending Provider Internal Medicine [phone] Byron Barton MD Attending Provider Internal Medicine [phone] Your Primary Care Physician (PCP) Primary Care Physician Office Phone Office Fax [facility] [phone] Follow-up Information Follow up With Details Comments Contact Info Vitor Winston MD On 9/20/2017 For discharge follow up at 11:30AM  [address] [place] [address] 
[phone] Appointments for Next 14 days 9/7/2017  2:15 PM  RADIATION ONC [facility] RADIATION THERAPY  
 9/8/2017  2:15 PM  RADIATION ONC [facility] RADIATION THERAPY  
 9/11/2017  2:15 PM  RADIATION ONC [facility] RADIATION THERAPY  
 9/12/2017  2:15 PM  RADIATION ONC [facility] RADIATION THERAPY  
 9/13/2017  2:15 PM  RADIATION ONC [facility] RADIATION THERAPY  
 9/14/2017  2:15 PM  RADIATION ONC [facility] RADIATION THERAPY 9/15/2017  2:15 PM  RADIATION ONC [facility] RADIATION THERAPY  
 9/18/2017  2:15 PM  RADIATION ONC [facility] RADIATION THERAPY  
 9/19/2017  2:15 PM  RADIATION ONC [facility] RADIATION THERAPY  
 9/20/2017 11:30 AM  [address] [facility] MAIN OFFICE-ANNEX  
 9/20/2017  2:15 PM  RADIATION ONC [facility] RADIATION THERAPY  
 9/21/2017  2:15 PM  RADIATION ONC [facility] RADIATION THERAPY  
 9/22/2017  2:15 PM  RADIATION ONC [facility] RADIATION THERAPY Current Discharge Medication List  
  
START taking these medications Dose & Instructions Dispensing Information Comments Morning Noon Evening Bedtime QUEtiapine 50 mg tablet Commonly known as:  SEROquel Your last dose was: Your next dose is:    
   
   
 Dose:  50 mg Take 1 Tab by mouth nightly. Quantity:  30 Tab Refills:  0 CONTINUE these medications which have NOT CHANGED Dose & Instructions Dispensing Information Comments Morning Noon Evening Bedtime ALLERGY RELIEF(CHLORPHENIRAMN) 4 mg tablet Generic drug:  chlorpheniramine Your last dose was: Your next dose is: TAKE 1 TABLET BY MOUTH TWICE A DAY AS NEEDED FOR ALLERGIES OR RHINITIS Refills:  1  
     
   
   
   
  
 aspirin delayed-release 81 mg tablet Your last dose was: Your next dose is:    
   
   
 Dose:  81 mg Take 81 mg by mouth daily. Refills:  0  
     
   
   
   
  
 fluticasone 50 mcg/actuation nasal spray Commonly known as:  Rutzainab Para Your last dose was: Your next dose is:    
   
   
 Dose:  1 Spray 1 [place] by Both Nostrils route two (2) times a day. Refills:  0 NEURONTIN 800 mg tablet Generic drug:  gabapentin Your last dose was: Your next dose is: Take  by mouth three (3) times daily. Refills:  0  
     
   
   
   
  
 ondansetron hcl 8 mg tablet Commonly known as:  Mireya Walker  
   
 Your last dose was: Your next dose is:    
   
   
 Dose:  8 mg Take 1 Tab by mouth every eight (8) hours as needed for Nausea. Quantity:  30 Tab Refills:  5  
     
   
   
   
  
 pantoprazole 40 mg tablet Commonly known as:  PROTONIX Your last dose was: Your next dose is:    
   
   
 Dose:  40 mg Take 1 Tab by mouth daily. Gastric cancer, hx bleeding Quantity:  30 Tab Refills:  0  
     
   
   
   
  
 prochlorperazine 10 mg tablet Commonly known as:  COMPAZINE Your last dose was: Your next dose is: TAKE 1 TABLET BY MOUTH 4 TIMES A DAY FOR 8 WEEKS AS NEEDED Refills:  3 PROSCAR 5 mg tablet Generic drug:  finasteride Your last dose was: Your next dose is:    
   
   
 Dose:  5 mg Take 5 mg by mouth daily. Refills:  0  
     
   
   
   
  
 sucralfate 100 mg/mL suspension Commonly known as:  Curvin Hilt Your last dose was: Your next dose is:    
   
   
 Dose:  1 g Take 10 mL by mouth four (4) times daily as needed. Quantity:  500 mL Refills:  0  
     
   
   
   
  
 tamsulosin 0.4 mg capsule Commonly known as:  FLOMAX Your last dose was: Your next dose is:    
   
   
 Dose:  0.8 mg Take 0.8 mg by mouth daily. Refills:  0  
     
   
   
   
  
 traMADol 50 mg tablet Commonly known as:  ULTRAM  
   
Your last dose was: Your next dose is:    
   
   
 Dose:  50 mg Take 50 mg by mouth every eight (8) hours as needed for Pain. Refills:  0 STOP taking these medications   
 calcium-cholecalciferol (d3) 600-125 mg-unit Tab Commonly known as:  CALCIUM 600 + D  
   
  
 CLARITIN 10 mg tablet Generic drug:  loratadine  
   
  
 cyanocobalamin 1,000 mcg tablet Commonly known as:  VITAMIN B-12  
   
  
 dexamethasone 4 mg tablet Commonly known as:  DECADRON  
   
  
 L. acidoph & paracasei- S therm- Bifido 8 billion cell Cap cap Commonly known as:  ROMI-Q/RISAQUAD  
   
  
 lisinopril 5 mg tablet Commonly known as:  PRINIVIL, ZESTRIL  
   
  
 LORazepam 0.5 mg tablet Commonly known as:  ATIVAN  
   
  
 Nut.Tx,Spec. Frm,L-fr,Iron-FOS 0.08-2 gram-kcal/mL Liqd Commonly known as:  TWOCAL HN  
   
  
 raNITIdine 150 mg tablet Commonly known as:  ZANTAC Where to Get Your Medications Information on where to get these meds will be given to you by the nurse or doctor. ! Ask your nurse or doctor about these medications QUEtiapine 50 mg tablet Discharge Instructions Quetiapine (SEROquel, SEROquel XR, Seroquel XR 30-Day Sample Kit) - (By mouth) Why this medicine is used:  
Treats schizophrenia, bipolar disorder, or depression. Contact a nurse or doctor right away if you have: 
· Fast, slow, pounding, or uneven heartbeat; lightheadedness or fainting · Jerky uncontrollable muscle movements · Fever, sweating, confusion, muscle stiffness · Increased hunger or thirst, change in how much or how often you urinate Common side effects: · Sleepiness or unusual drowsiness, trouble with sleeping · Increased appetite, weight gain · Constipation, vomiting, nausea, dry mouth 
· Headache © 2017 [facility] Information is for End User's use only and may not be sold, redistributed or otherwise used for commercial purposes. Discharge Instructions PATIENT ID: Melody Lombardo Sr. MRN: [id number] YOB: 1931 DATE OF ADMISSION: 8/30/2017 10:33 PM   
DATE OF DISCHARGE: 9/7/2017 PRIMARY CARE PROVIDER: Wolfgang Hawley MD  
 
ATTENDING PHYSICIAN: Imani Hernandez MD 
DISCHARGING PROVIDER: Imani Hernandez MD   
To contact this individual call [phone] and ask the  to page. If unavailable ask to be transferred the Adult Hospitalist Department. DISCHARGE DIAGNOSES Esophageal cancer CONSULTATIONS: IP CONSULT TO HOSPITALIST 
 
PROCEDURES/SURGERIES: * No surgery found * PENDING TEST RESULTS:  
At the time of discharge the following test results are still pending: none FOLLOW UP APPOINTMENTS:  
Follow-up Information Follow up With Details Comments Contact Info Zee Wray MD In 1 week  [address] [place] [address] [phone] ADDITIONAL CARE RECOMMENDATIONS:  
Follow up with PMD in 1 week DIET: PEG feeding ACTIVITY: Activity as tolerated DISCHARGE MEDICATIONS: 
 See Medication Reconciliation Form · It is important that you take the medication exactly as they are prescribed. · Keep your medication in the bottles provided by the pharmacist and keep a list of the medication names, dosages, and times to be taken in your wallet. · Do not take other medications without consulting your doctor. NOTIFY YOUR PHYSICIAN FOR ANY OF THE FOLLOWING:  
Fever over 101 degrees for 24 hours. Chest pain, shortness of breath, fever, chills, nausea, vomiting, diarrhea, change in mentation, falling, weakness, bleeding. Severe pain or pain not relieved by medications. Or, any other signs or symptoms that you may have questions about. DISPOSITION: 
x  Home With: 
 OT  PT  [place]  RN  
  
 SNF/Inpatient Rehab/LTAC Independent/assisted living  
x Hospice Other: CDMP Checked:  
Yes x PROBLEM LIST Updated: 
Yes x Signed:  
Alie Licona MD 
9/7/2017 
8:38 AM 
 
Discharge Orders None [employer] Announcement We are excited to announce that we are making your provider's discharge notes available to you in [employer]. You will see these notes when they are completed and signed by the physician that discharged you from your recent hospital stay.   If you have any questions or concerns about any information you see in [employer], please call the Health Information Department where you were seen or reach out to your Primary Care Provider for more information about your plan of care. Introducing [facility]! Dear Abbie Sims: Thank you for requesting a Skyscanner account. Our records indicate that you already have an active Skyscanner account. You can access your account anytime at [email or web address] Did you know that you can access your hospital and ER discharge instructions at any time in Skyscanner? You can also review all of your test results from your hospital stay or ER visit. Additional Information If you have questions, please visit the Frequently Asked Questions section of the Skyscanner website at [email or web address] Remember, Skyscanner is NOT to be used for urgent needs. For medical emergencies, dial 911. Now available from your iPhone and Android! General Information Please provide this summary of care documentation to your next provider. Patient Signature:  ____________________________________________________________ Date:  ____________________________________________________________  
  
Autumn Figueroa Provider Signature:  ____________________________________________________________ Date:  ____________________________________________________________

## 2017-08-30 NOTE — PROGRESS NOTES
200 Pt admit to [facility] for C2 Taxol/Carboplatin wheelchair in stable condition. Assessment completed. No new concerns voiced. Port accessed and flushed with positive blood return. Labs drawn per order and sent for processing. MD at bedside for f/u appt. Verbal orders received from Carmelita Carrizales NP to add 1L bolus to today's treatment. Visit Vitals    /90    Pulse 85    Temp 97.3 °F (36.3 °C)    Resp 18    Wt 82 kg (180 lb 12.8 oz)    SpO2 95%    BMI 24.52 kg/m2       Medications:  Normal Saline KVO  Normal Saline 1000ml  Aloxi IV Push  Decadron IV Push  Pepcid IV Push  Benadryl IV Push  Taxol  Carboplatin   Heparin Flush    1600 Pt tolerated treatment well. Port maintained positive blood return throughout treatment, flushed with positive blood return at conclusion, port heparinized and de-accessed per protocol. D/c home ambulatory in no distress with daughter 9/6/17. Recent Results (from the past 12 hour(s))   CBC WITH AUTOMATED DIFF    Collection Time: 08/30/17 11:38 AM   Result Value Ref Range    WBC 5.4 4.1 - 11.1 K/uL    RBC 3.11 (L) 4.10 - 5.70 M/uL    HGB 9.9 (L) 12.1 - 17.0 g/dL    HCT 28.5 (L) 36.6 - 50.3 %    MCV 91.6 80.0 - 99.0 FL    MCH 31.8 26.0 - 34.0 PG    MCHC 34.7 30.0 - 36.5 g/dL    RDW 13.2 11.5 - 14.5 %    PLATELET 100 939 - 566 K/uL    NEUTROPHILS 76 (H) 32 - 75 %    LYMPHOCYTES 10 (L) 12 - 49 %    MONOCYTES 6 5 - 13 %    EOSINOPHILS 8 (H) 0 - 7 %    BASOPHILS 0 0 - 1 %    ABS. NEUTROPHILS 4.2 1.8 - 8.0 K/UL    ABS. LYMPHOCYTES 0.5 (L) 0.8 - 3.5 K/UL    ABS. MONOCYTES 0.3 0.0 - 1.0 K/UL    ABS. EOSINOPHILS 0.4 0.0 - 0.4 K/UL    ABS.  BASOPHILS 0.0 0.0 - 0.1 K/UL    DF SMEAR SCANNED      RBC COMMENTS OVALOCYTES  1+        RBC COMMENTS ANISOCYTOSIS  1+       METABOLIC PANEL, BASIC    Collection Time: 08/30/17 11:38 AM   Result Value Ref Range    Sodium 131 (L) 136 - 145 mmol/L    Potassium 4.3 3.5 - 5.1 mmol/L    Chloride 96 (L) 97 - 108 mmol/L    CO2 27 21 - 32 mmol/L    Anion gap 8 5 - 15 mmol/L    Glucose 100 65 - 100 mg/dL    BUN 22 (H) 6 - 20 MG/DL    Creatinine 1.25 0.70 - 1.30 MG/DL    BUN/Creatinine ratio 18 12 - 20      GFR est AA >60 >60 ml/min/1.73m2    GFR est non-AA 55 (L) >60 ml/min/1.73m2    Calcium 8.6 8.5 - 10.1 MG/DL

## 2017-08-30 NOTE — PROGRESS NOTES
Hematology/Oncology Progress Note    DIAGNOSIS: GE junction adenocarcinoma  TREATMENT: Carboplatin (AUC 2) and Paclitaxel (50mg/m2) in conjunction with XRT  Cycle 1 - 8/23/17  Cycle 2 - 8/23/17    HISTORY OF PRESENT ILLNESS: Mr. Adelaida Parker is a 80 y.o. male with HTN and aortic aneurysm who presents for evaluation of newly diagnosed GE junction adenocarcinoma. He has had a dull epigastric abdominal pain ×6-7 months with progressive worsening. Pian does not radiate. He thought this is due to PUD and made dietary changes, without improvement. Pain was aggravated by solid food and gradually progressed to intolerance for liquids. He also reported a 20 pound weight loss over the past several months with fatigue. He was seen by GI Dr. Sandy Dancer who performed an EGD on 6/28/17. Findings were notable for a 3-4 cm friable mass distal to the GE junction and extending down the cardia. 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 CT chest abdomen and pelvis done on 7/6/17 did not show any evidence of metastatic disease. His PET scan on 7/25/17 showed no evidence of metastatic disease. Unfortunately his tumor prevented passage of the EUS scope and thus it was not able to be completed. Quit smoking 30 years ago. No ETOH. Presents today for cycle 2 Carboplatin/Etoposide. He continues to struggle with nausea but states it is well controlled currently. He denies any vomiting. He is taking zofran and ativan regularly and compazine if these two do not work. He is starting to eat some small amount of food by mouth. Daughter states that he had applesauce yesterday morning. Tube feeds are going well. He remains active in the home. He is dressing himself, ambulating throughout the home, and sits outside in the mornings. Patient and daughter do endorse frequent naps. His bowels are moving regularly.  States he has not had a bowel movement in two days but has not taken his miralax as this had caused some diarrhea. He plans to take this tonight. Denies any pain. Daughter present. Past Medical History:   Diagnosis Date    Acid reflux     Arthritis     right  left knees    Back pain, chronic 4/11/2014    Cancer ([facility] [address] 2017    esophegeal    Decreased hearing of both ears 4/5/2017    DNR (do not resuscitate) 4/5/2017    Gastroesophageal reflux disease without esophagitis 6/13/2017    GERD (gastroesophageal reflux disease)     Hip pain, left 4/11/2014    Hypercholesterolemia     Hypertension     Need for varicella vaccine 4/11/2014    Physical deconditioning 8/17/2017    Rash of entire body 9/15/2016    Restless leg syndrome        Past Surgical History:   Procedure Laterality Date    ABDOMEN SURGERY PROC UNLISTED  1980's    gall bladder    ABDOMEN SURGERY PROC UNLISTED  15 yrs ago    right hernia repair    HX HEENT      EYE SURGERY 40 YRS AGO.  HX ORTHOPAEDIC      fluid removed both knees    HX ORTHOPAEDIC  2010    bilateral knee replacements       No Known Allergies    Current Outpatient Prescriptions   Medication Sig Dispense Refill    prochlorperazine (COMPAZINE) 10 mg tablet TAKE 1 TABLET BY MOUTH 4 TIMES A DAY FOR 8 WEEKS AS NEEDED  3    sucralfate (CARAFATE) 100 mg/mL suspension Take 10 mL by mouth four (4) times daily as needed. 500 mL 0    Nut. Tx,Spec. Frm,L-fr,Iron-FOS (TWOCAL HN) 0.08-2 gram-kcal/mL liqd 5 Cans by Per G Tube route daily. 150 Bottle 6    lisinopril (PRINIVIL, ZESTRIL) 5 mg tablet TAKE 1 TABLET BY MOUTH DAILY  6    dexamethasone (DECADRON) 4 mg tablet Take 2 tabs for 2 days after each chemotherapy on Thursday and Friday 30 Tab 2    LORazepam (ATIVAN) 0.5 mg tablet Take 1 Tab by mouth every eight (8) hours as needed (nausea).  Max Daily Amount: 1.5 mg. 30 Tab 1    ALLERGY RELIEF,CHLORPHENIRAMN, 4 mg tablet TAKE 1 TABLET BY MOUTH TWICE A DAY AS NEEDED FOR ALLERGIES OR RHINITIS  1    fluticasone (FLONASE) 50 mcg/actuation nasal spray 1 Spray by Both Nostrils route two (2) times a day.  L. acidoph & paracasei- S therm- Bifido (ROMI-Q/RISAQUAD) 8 billion cell cap cap Take 1 Cap by mouth daily. 30 Cap 6    calcium-cholecalciferol, d3, (CALCIUM 600 + D) 600-125 mg-unit tab Take 1 Tab by mouth daily. 30 Tab 7    cyanocobalamin (VITAMIN B-12) 1,000 mcg tablet Take 1 Tab by mouth daily. 30 Tab 6    ondansetron hcl (ZOFRAN) 8 mg tablet Take 1 Tab by mouth every eight (8) hours as needed for Nausea. 30 Tab 5    pantoprazole (PROTONIX) 40 mg tablet Take 1 Tab by mouth daily. Gastric cancer, hx bleeding 30 Tab 0    raNITIdine (ZANTAC) 150 mg tablet Take 1 Tab by mouth two (2) times daily as needed for Indigestion. (Patient taking differently: Take  by mouth daily.) 20 Tab 0    traMADol (ULTRAM) 50 mg tablet Take 50 mg by mouth every eight (8) hours as needed for Pain.  aspirin delayed-release 81 mg tablet Take 81 mg by mouth daily.  loratadine (CLARITIN) 10 mg tablet Take 10 mg by mouth daily.  finasteride (PROSCAR) 5 mg tablet Take 5 mg by mouth daily.  tamsulosin (FLOMAX) 0.4 mg capsule Take 0.8 mg by mouth daily.  gabapentin (NEURONTIN) 800 mg tablet Take  by mouth three (3) times daily.        Current Facility-Administered Medications   Medication Dose Route Frequency Provider Last Rate Last Dose    sodium chloride 0.9 % injection 10 mL  10 mL IntraVENous PRN Telma Nichols MD        heparin (porcine) pf 500 Units  500 Units IntraVENous PRN Toña Flower MD        sodium chloride (NS) flush 5-10 mL  5-10 mL IntraVENous PRN Toña Flower MD        0.9% sodium chloride infusion  25 mL/hr IntraVENous CONTINUOUS eTlma Nichols MD        palonosetron HCl (ALOXI) injection 0.25 mg  0.25 mg IntraVENous ONCE Toña Flower MD        dexamethasone (DECADRON) 4 mg/mL injection 12 mg  12 mg IntraVENous ONCE Toña Flower MD        famotidine (PF) (PEPCID) 20 mg in sodium chloride 0.9 % 10 mL injection  20 mg IntraVENous ONCE Telma Nichols MD        diphenhydrAMINE (BENADRYL) injection 25 mg  25 mg IntraVENous ONCE Telma Nichols MD        PACLitaxel (TAXOL) 100 mg in 0.9% sodium chloride 250 mL, overfill volume 25 mL chemo infusion  100 mg IntraVENous ONCE Nam PayMD sal        CARBOplatin (PARAPLATIN) 125 mg in 0.9% sodium chloride 250 mL, overfill volume 25 mL chemo infusion  125 mg IntraVENous ONCE Unknown Payor, MD           Social History     Social History    Marital status:      Spouse name: N/A    Number of children: N/A    Years of education: N/A     Social History Main Topics    Smoking status: Former Smoker     Years: 0.00     Quit date: 4/28/1970    Smokeless tobacco: Never Used    Alcohol use Yes      Comment: OCCASSIONALLY    Drug use: No    Sexual activity: Not Currently     Other Topics Concern    None     Social History Narrative       Family History   Problem Relation Age of Onset    Heart Disease Sister        ROS  A 12 point review of systems was obtained and is negative except as listed in HPI.   ECOG PS is 2    Physical Examination:   Visit Vitals    /90    Pulse 85    Temp 97.3 °F (36.3 °C)    Resp 18    Wt 180 lb 12.8 oz (82 kg)    SpO2 95%    BMI 24.52 kg/m2     General appearance - drowsy, chronically ill appearing, and in no acute distress  Mental status - oriented to person and place  Mouth - mucous membranes dry  Neck - supple, no significant adenopathy  Chest - clear to auscultation, no wheezes, rales or rhonchi, symmetric air entry  Heart - normal rate, regular rhythm  Abdomen - soft, non tender, non distended, PEG tube in place  Neurological - normal speech, no focal findings or movement disorder noted, gait not assessed  Musculoskeletal - no joint tenderness, deformity or swelling  Extremities - peripheral pulses normal, no pedal edema, no clubbing or cyanosis  Skin - normal coloration and turgor, no rashes, no suspicious skin lesions noted     LABS  Lab Results   Component Value Date/Time    WBC 5.4 08/30/2017 11:38 AM    HGB 9.9 08/30/2017 11:38 AM    HCT 28.5 08/30/2017 11:38 AM    PLATELET 985 29/15/3134 11:38 AM    MCV 91.6 08/30/2017 11:38 AM    ABS. NEUTROPHILS PENDING 08/30/2017 11:38 AM     Lab Results   Component Value Date/Time    Sodium 135 08/24/2017 04:13 PM    Potassium 4.3 08/24/2017 04:13 PM    Chloride 103 08/24/2017 04:13 PM    CO2 26 08/24/2017 04:13 PM    Glucose 104 08/24/2017 04:13 PM    BUN 24 08/24/2017 04:13 PM    Creatinine 1.32 08/24/2017 04:13 PM    GFR est AA >60 08/24/2017 04:13 PM    GFR est non-AA 51 08/24/2017 04:13 PM    Calcium 8.2 08/24/2017 04:13 PM    BUN (POC) 25 08/02/2017 08:19 AM    Calcium, ionized (POC) 1.20 08/02/2017 08:19 AM     Lab Results   Component Value Date/Time    AST (SGOT) 29 08/23/2017 11:37 AM    Alk. phosphatase 123 08/23/2017 11:37 AM    Protein, total 6.4 08/23/2017 11:37 AM    Albumin 2.9 08/23/2017 11:37 AM    Globulin 3.5 08/23/2017 11:37 AM    A-G Ratio 0.8 08/23/2017 11:37 AM       PATHOLOGY     FINAL PATHOLOGIC DIAGNOSIS   1. Stomach, antrum and body, biopsy   Antral-type mucosa with intestinal metaplasia and mild chronic superficial gastritis   No H. pylori organisms identified by H&E   2. 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 No PET avid  evidence of metastatic disease.     ASSESSMENT  Mr. Arik Horn is a 80 y.o. male with  1. 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 EUS could not be completed due to mechanical interference from the mass hence accurate staging is not available. 2. Dysphagia  3. Abdominal pain  4. CKD Stage III    DISCUSSION  Reviewed pathology and staging.  He has declined curative surgery, given his age, that is a reasonable decision. We discussed chemoradiation with the goal of improving both local and systemic control of disease. Went over treatment per the phase III CROSS trial which compared outcomes for potentially resectable GEJ Ca with chemoXRT vs Sx. Chemotherapy used in this study was weekly Carboplatin and Taxol. At a median follow-up of 32 months, overall survival was significantly better with preoperative chemoradiotherapy (HR for death 0.657, 95% CI 0.495-0.871, three-year survival rate 58 versus 44 percent). The survival benefit persisted with longer (median 84 month) follow-up (five-year survival 47 versus 33 percent, HR for death 0.67, 95% CI 0.51-0.87    PLAN  1. GE junction cancer. He is here for cycle 2 weekly Carbo (AUC 2) and Paclitaxel (50mg/m2) in conjunction with radiation. He remains frail, however, is around his baseline. CBC stable. CMP with hyponatremia as below #5 but otherwise looks good. He had some trouble with delirium after last treatment. This is possibly related to steroids and will decrease to 4mg for two days following treatment. Will continue to monitor. Will proceed today with therapy as planned. 2. Nausea. Due to cancer. No vomiting. Continues to struggle with this. Ativan and zofran combination work most of the time per daughter report. Compazine added when needed. Steroid dose decreased to 1 tab twice daily for the two days following chemotherapy. Discussed with Terese Holguin, [address] [place] [address]. 3. Dysphagia. PEG tube for nutrition. Tube feeds via pump. 4. Generalized weakness. Daughter reports some improvement. Ambulating around the home with cane. PT/OT in the home. Received hospital bed yesterday. 5. Hyponatremia. Sodium 131 today. Will administer 1000ml NS in OPIC. He may need to increase flush with tube feeds. Will have RD follow up.     Follow up in 2 weeks for cycle 3    Pardeep Silva NP

## 2017-08-31 NOTE — PROGRESS NOTES
Occupational Therapy 1134    Attempted to see patient for OT evaluation however sitter reported pt has just fallen asleep after being awake all night. Sitter and nursing requesting therapy defer at this time to allow pt to rest.  Will follow up as able/appropriate.     Felix Daniels OTR/L

## 2017-08-31 NOTE — ED PROVIDER NOTES
HPI Comments: 80 y.o. 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 Per EMS, pt's daughter checked on the patient at 5 and noticed he was altered. Pt received chemotherapy today and per EMS may have a history of the same after receiving chemo. Before pt was found altered, pt's daughter states he was doing well after chemotherapy today. Pt is talking, but obviously confused. He follows some commands. Pt is without facial droop or weakness. Pt is unsure why he is here. There are no other acute medical concerns at this time. Old Chart Review:  Pt admitted to the hospital 25 days ago with disorientation. Pt has a history of HTN, aortic aneurysm and newly diagnosed stomach cancer. Full history, physical exam, and ROS unable to be obtained due to:  condition. Social hx: former tobacco smoker; occasional EtOH use; denies illicit drug use  PCP: Shyann Lambert MD    Note written by Milan Adams, as dictated by Severino Joseph MD 10:53 PM        The history is provided by the patient and the EMS personnel. The history is limited by the condition of the patient. No  was used.         Past Medical History:   Diagnosis Date    Acid reflux     Arthritis     right  left knees    Back pain, chronic 4/11/2014    Cancer ([facility] [address] 2017    esophegeal    Decreased hearing of both ears 4/5/2017    DNR (do not resuscitate) 4/5/2017    Gastroesophageal reflux disease without esophagitis 6/13/2017    GERD (gastroesophageal reflux disease)     Hip pain, left 4/11/2014    Hypercholesterolemia     Hypertension     Need for varicella vaccine 4/11/2014    Physical deconditioning 8/17/2017    Rash of entire body 9/15/2016    Restless leg syndrome        Past Surgical History:   Procedure Laterality Date    ABDOMEN SURGERY PROC UNLISTED  1980's    gall bladder    ABDOMEN SURGERY PROC UNLISTED  15 yrs ago    right hernia repair    HX HEENT      EYE SURGERY 40 YRS AGO.  HX ORTHOPAEDIC      fluid removed both knees    HX ORTHOPAEDIC  2010    bilateral knee replacements         Family History:   Problem Relation Age of Onset    Heart Disease Sister        Social History     Social History    Marital status:      Spouse name: N/A    Number of children: N/A    Years of education: N/A     Occupational History    Not on file. Social History Main Topics    Smoking status: Former Smoker     Years: 0.00     Quit date: 4/28/1970    Smokeless tobacco: Never Used    Alcohol use Yes      Comment: OCCASSIONALLY    Drug use: No    Sexual activity: Not Currently     Other Topics Concern    Not on file     Social History Narrative         ALLERGIES: Review of patient's allergies indicates no known allergies. Review of Systems   Unable to perform ROS: Acuity of condition       Vitals:    08/30/17 2256   BP: 123/72   Pulse: 94   Resp: 16   Temp: 98 °F (36.7 °C)   SpO2: 96%            Physical Exam   Constitutional: He appears well-developed. No distress. HENT:   Head: Normocephalic and atraumatic. Eyes: Conjunctivae and EOM are normal.   Neck: Normal range of motion. Neck supple. Cardiovascular: Normal rate, regular rhythm and normal heart sounds. No murmur heard. Pulmonary/Chest: Effort normal and breath sounds normal. No respiratory distress. Abdominal: Soft. Bowel sounds are normal. He exhibits no distension. There is no tenderness. There is no rebound. Musculoskeletal: Normal range of motion. He exhibits no edema or tenderness. Neurological: He is alert. No cranial nerve deficit. He exhibits normal muscle tone. Coordination normal.   Alert and oriented to himself. Confused, disoriented to time, place and situation. Skin: Skin is warm and dry. Nursing note and vitals reviewed. Note written by Dread Mueller. Milan Perez, as dictated by Hawk Anglin MD 10:53 PM     [facility]  ED Course       Procedures    ED EKG interpretation:  Rhythm: normal sinus rhythm. Rate (approx.): 89; Axis: normal; Normal interval. ST/T wave: normal. Anteroseptal Q-wave, age undetermined. No STEMI. Note written by Severo Lab, Scribe, as dictated by Hawk Anglin MD 10:59 PM    CONSULT NOTE:  11:01 PM Hawk Anglin MD spoke with Dr. Willy Castellanos, Consult for Tele-neurology. Discussed available diagnostic tests and clinical findings. He is in agreement with care plans as outlined. Dr. Verona Noyola states patient is not a TPA candidate. Cancel Code S. Admit for delirium and possible MRI if it does not resolve. CONSULT NOTE:  11:35 PM Hawk Anglin MD spoke with Dr. Dang Quintero, Consult for Hospitalist.  Discussed available diagnostic tests and clinical findings. He is in agreement with care plans as outlined. Dr. Dang Quintero will evaluate and admit the patient.

## 2017-08-31 NOTE — PROGRESS NOTES
Problem: Self Care Deficits Care Plan (Adult)  Goal: *Acute Goals and Plan of Care (Insert Text)  Occupational Therapy Goals  Initiated 8/31/2017  1. Patient will perform grooming standing at the sink with supervision/set-up within 7 day(s). 2. Patient will perform lower body dressing with supervision/set-up within 7 day(s). 3. Patient will follow 1 step commands 100% of the time within 7 day(s). 4. Patient will perform toilet transfers with supervision/set-up within 7 day(s). 5. Patient will utilize fall prevention techniques during functional activities with verbal cues within 7 day(s). OCCUPATIONAL THERAPY EVALUATION  Patient: Jewell Argueta Sr. (80 y.o. male)  Date: 8/31/2017  Primary Diagnosis: Altered mental status        Precautions:   Bed Alarm, Fall      ASSESSMENT :  Based on the objective data described below, the patient presents with generalized weakness, decreased standing balance (mild) but significant cognitive deficits. Pt is currently disoriented to place, situation and time. [place] and despite being reoriented multiple times he continues to look out the window and comment on being at the river. Noted impulsive, unsafe behavior, decreased command following and difficulty with problem solving (attempted to use urinal but unable to line correctly and voided on the floor and soaked his pants). Unclear of baseline mental status however anticipate pt is well below his baseline. Pt overall CGA for ADLs/mobility but required total assist to perform hygiene after missing urinal.   Will continue to follow with focus on simple cogntive tasks next session. Patient will benefit from skilled intervention to address the above impairments.   Patients rehabilitation potential is considered to be Good  Factors which may influence rehabilitation potential include:   [ ]             None noted  [X]             Mental ability/status  [ ]             Medical condition  [ ] Home/family situation and support systems  [ ]             Safety awareness  [ ]             Pain tolerance/management  [ ]             Other:        PLAN :  Recommendations and Planned Interventions:  [X]               Self Care Training                  [X]        Therapeutic Activities  [X]               Functional Mobility Training    [X]        Cognitive Retraining  [X]               Therapeutic Exercises           [ ]        Endurance Activities  [X]               Balance Training                   [ ]        Neuromuscular Re-Education  [ ]               Visual/Perceptual Training     [X]   Home Safety Training  [X]               Patient Education                 [X]        Family Training/Education  [ ]               Other (comment):     Frequency/Duration: Patient will be followed by occupational therapy 3 times a week to address goals. Discharge Recommendations: if home will need 24 hour assist/supervision; if not pt will need SNF  Further Equipment Recommendations for Discharge: TBD       SUBJECTIVE:   Patient stated I love how tranquil the river is.       OBJECTIVE DATA SUMMARY:   HISTORY:   Past Medical History:   Diagnosis Date    Acid reflux      Arthritis       right  left knees    Back pain, chronic 4/11/2014    Cancer ([facility] [address] 2017     esophegeal    Decreased hearing of both ears 4/5/2017    DNR (do not resuscitate) 4/5/2017    Gastroesophageal reflux disease without esophagitis 6/13/2017    GERD (gastroesophageal reflux disease)      Hip pain, left 4/11/2014    Hypercholesterolemia      Hypertension      Need for varicella vaccine 4/11/2014    Physical deconditioning 8/17/2017    Rash of entire body 9/15/2016    Restless leg syndrome       Past Surgical History:   Procedure Laterality Date    ABDOMEN SURGERY PROC UNLISTED   1980's     gall bladder    ABDOMEN SURGERY PROC UNLISTED   15 yrs ago     right hernia repair    HX HEENT         EYE SURGERY 40 YRS AGO.     HX ORTHOPAEDIC         fluid removed both knees    HX ORTHOPAEDIC   2010     bilateral knee replacements        Prior Level of Function/Home Situation: Unknown; chart indicates pt ambulates with SPC and has a hospital bed at home  Expanded or extensive additional review of patient history:         [X]  Right hand dominant             [ ]  Left hand dominant     EXAMINATION OF PERFORMANCE DEFICITS:  Cognitive/Behavioral Status:  Neurologic State: Alert;Confused  Orientation Level: Disoriented to place; Disoriented to situation;Disoriented to time  Cognition: Decreased attention/concentration;Decreased command following; Impaired decision making; Impulsive;Memory loss  Perception: Appears intact  Perseveration: Perseverates during conversation  Safety/Judgement: Decreased awareness of environment;Decreased awareness of need for assistance;Decreased awareness of need for safety;Decreased insight into deficits; Fall prevention     Skin: intact     Edema: None noted     Hearing:        Vision/Perceptual:         Acuity:  (able to read newspaper)          Range of Motion:  AROM: Within functional limits     Strength:  Strength: Generally decreased, functional     Coordination:  Coordination: Generally decreased, functional  Fine Motor Skills-Upper: Left Intact; Right Intact    Gross Motor Skills-Upper: Left Intact; Right Intact     Tone & Sensation:  Tone: Normal     Balance:  Sitting: Intact  Standing: Impaired  Standing - Static: Fair  Standing - Dynamic : Fair     Functional Mobility and Transfers for ADLs:  Bed Mobility:  Supine to Sit:  (received seated EOB)     Transfers:  Sit to Stand: Contact guard assistance  Bed to Chair: Contact guard assistance  Toilet Transfer : Contact guard assistance     ADL Assessment:  Feeding:  (currently NPO on tube feeds; infer IND)     Oral Facial Hygiene/Grooming: Independent     Bathing: Contact guard assistance     Upper Body Dressing: Supervision     Lower Body Dressing: Contact guard assistance     Toileting: Total assistance (due to spilling when using urinal)     ADL Intervention and task modifications:     Lower Body Dressing Assistance  Pants With Elastic Waist:  (assist due to soiled clothing)  Slip on Shoes with Back: Supervision/set-up  Leg Crossed Method Used: Yes     Toileting  Toileting Assistance: Total assistance(dependent) (Unable to correctly line up urinal saturating pants)  Bladder Hygiene: Supervision/set-up  Clothing Management: Contact guard assistance     Cognitive Retraining  Executive Functions: Executing cognitive plans  Attention to Task: Single task  Maintains Attention For (Time): 30 seconds  Following Commands: Awareness of environment (aprox 50% of time)  Safety/Judgement: Decreased awareness of environment;Decreased awareness of need for assistance;Decreased awareness of need for safety;Decreased insight into deficits; Fall prevention  Cues: Tactile cues provided;Verbal cues provided;Visual cues provided     Functional Measure:  Barthel Index:      Bathin  Bladder: 5  Bowels: 10  Groomin  Dressin  Feeding: 10  Mobility: 0  Stairs: 0  Toilet Use: 5  Transfer (Bed to Chair and Back): 10  Total: 50         Barthel and G-code impairment scale:  Percentage of impairment CH  0% CI  1-19% CJ  20-39% CK  40-59% CL  60-79% CM  80-99% CN  100%   Barthel Score 0-100 100 99-80 79-60 59-40 20-39 1-19    0   Barthel Score 0-20 20 17-19 13-16 9-12 5-8 1-4 0      The Barthel ADL Index: Guidelines  1. The index should be used as a record of what a patient does, not as a record of what a patient could do. 2. The main aim is to establish degree of independence from any help, physical or verbal, however minor and for whatever reason. 3. The need for supervision renders the patient not independent. 4. A patient's performance should be established using the best available evidence.  Asking the patient, friends/relatives and nurses are the usual sources, but direct observation and common sense are also important. However direct testing is not needed. 5. Usually the patient's performance over the preceding 24-48 hours is important, but occasionally longer periods will be relevant. 6. Middle categories imply that the patient supplies over 50 per cent of the effort. 7. Use of aids to be independent is allowed. Alem Lucas., Barthel, D.W. ([id number]). Functional evaluation: the Barthel Index. [facility] (14)2. BEHZAD Mc, Jos Palm., Hawk Oliveira., [place], [facility] (1999). Measuring the change indisability after inpatient rehabilitation; comparison of the responsiveness of the Barthel Index and Functional [place] Measure. Journal of Neurology, Neurosurgery, and Psychiatry, 66(4), 627-584. STELLA Varela, KERLINE Ramirez, & Bonny Carbone MLayoA. (2004.) Assessment of post-stroke quality of life in cost-effectiveness studies: The usefulness of the Barthel Index and the EuroQoL-5D. Quality of Life Research, 13, 704-95         G codes: In compliance with CMSs Claims Based Outcome Reporting, the following G-code set was chosen for this patient based on their primary functional limitation being treated: The outcome measure chosen to determine the severity of the functional limitation was the Barthel Index with a score of 50/100 which was correlated with the impairment scale.       · Self Care:               - CURRENT STATUS:    CK - 40%-59% impaired, limited or restricted               - GOAL STATUS:           CI - 1%-19% impaired, limited or restricted               - D/C STATUS:                       ---------------To be determined---------------      Occupational Therapy Evaluation Charge Determination   History Examination Decision-Making   MEDIUM Complexity : Expanded review of history including physical, cognitive and psychosocial  history  MEDIUM Complexity : 3-5 performance deficits relating to physical, cognitive , or psychosocial skils that result in activity limitations and / or participation restrictions MEDIUM Complexity : Patient may present with comorbidities that affect occupational performnce. Miniml to moderate modification of tasks or assistance (eg, physical or verbal ) with assesment(s) is necessary to enable patient to complete evaluation       Based on the above components, the patient evaluation is determined to be of the following complexity level: MEDIUM  Pain:  Pain Scale 1: Numeric (0 - 10)  Pain Intensity 1: 0              Activity Tolerance:   No s/s of distress     Please refer to the flowsheet for vital signs taken during this treatment. After treatment:   [X] Patient left in no apparent distress sitting up in chair  [ ] Patient left in no apparent distress in bed  [X] Call bell left within reach  [X] Nursing notified  [ ] Caregiver present  [X] Chair alarm activated      COMMUNICATION/EDUCATION:   The patients plan of care was discussed with: Physical Therapist and Registered Nurse.  [X] Home safety education was provided and the patient/caregiver indicated understanding. [X] Patient/family have participated as able in goal setting and plan of care. [X] Patient/family agree to work toward stated goals and plan of care. [ ] Patient understands intent and goals of therapy, but is neutral about his/her participation. [ ] Patient is unable to participate in goal setting and plan of care. This patients plan of care is appropriate for delegation to [facility].      Thank you for this referral.  Gordo Saldaña OT  Time Calculation: 35 mins

## 2017-08-31 NOTE — PROGRESS NOTES
Problem: Falls - Risk of  Goal: *Absence of Falls  Document Billie Fall Risk and appropriate interventions in the flowsheet. Outcome: Progressing Towards Goal  Fall Risk Interventions:  Mobility Interventions: Bed/chair exit alarm, Communicate number of staff needed for ambulation/transfer, Patient to call before getting OOB     Mentation Interventions: Family/sitter at bedside, Door open when patient unattended, Evaluate medications/consider consulting pharmacy, Bed/chair exit alarm     Medication Interventions: Patient to call before getting OOB, Evaluate medications/consider consulting pharmacy     Elimination Interventions: Call light in reach     Transferring from 440 to [phone]. Sitter in room all day today in addition to all other fall strategies. Problem: Nutrition Deficit  Goal: *Optimize nutritional status  Outcome: Progressing Towards Goal  NPO per speech recommendations. Peg tube-main nutrition-tube feedings restarted per home regimen. Problem: General Medical Care Plan  Goal: *Anxiety reduced or absent  Outcome: Progressing Towards Goal  Sitter helps. Just had chemo last week-electrolyte imbalances and chemo 'brain' effect. Goal: *Progressive mobility and function (eg: ADLs)  Outcome: Progressing Towards Goal  PT/OT/ST on board. Problem: Patient Education: Go to Patient Education Activity  Goal: Patient/Family Education  Outcome: Progressing Towards Goal  No family here today so far. Educating patient, but he has impulsive nature, confused and can become agitated at times.

## 2017-08-31 NOTE — H&P
[address]   [address] [facility], [address] [place] [address]   HISTORY AND PHYSICAL       Name:  Yue Lowe   MR#:  [id number]   :  1931   Account #:  [de-identified]        Date of Adm:  2017       CHIEF COMPLAINT: The patient does not provide. HISTORY OF PRESENT ILLNESS: An 77-year-old white male with   past history of GE junction, esophageal adenocarcinoma with   metastasis, hypertension, chronic pain, GERD, arthritis,   hyperlipidemia, restless leg syndrome, presented to emergency   department from home with reported altered mental status. The patient   is a poor historian, very confused, anxious, agitated. He is   accompanied by his son, who is power of , as well as his   daughter who provides additional history. Remainder of history was   obtained from review of ED medical reports. Per the collective reports,   the patient had acute onset of altered mental status starting earlier in   the day after having chemotherapy. Symptoms remained constant,   severe, without specific, definite exacerbating or alleviating factors. He   remained confused. There was no report of slurred speech, facial   droop, focal weakness, numbness, paresthesias, headache, new-onset   visual disturbance, neck pain, back pain, chest pain, shortness of   breath, cough, congestion, abdominal pain, nausea, vomiting, diarrhea,   melena, dysuria, hematuria, calf pain, increased swelling, edema. The   patient's daughter and son note the patient had similar presentation   approximately a week ago when he had his last chemotherapy   treatment. They also note that the patient has been given other   medication for \"nausea. \" He has been taking Compazine, ondansetron,   and, per their report, also Ativan. The patient's daughter was under the   impression the patient was taking Ativan for nausea.  According to the   son, last week the patient had chemotherapy, had onset of altered   mental status with confusion, restlessness, agitation, anxiety. He   subsequently had IV fluid infusion, slept most of the next day, and then   the following day reportedly was back to normal. The family note their   concern the patient is having recurrence of these symptoms and there   is concern for chemotherapy. The patient is followed by oncologist for   the same. The patient was last hospitalized from 08/05/2017 to   08/11/2017 with altered mental status with suspected aspiration. During EGD, PEG tube placement 08/08/2017, at which time he had   removal of esophageal stent. PEG tube had been placed and PEG   tube remains in place. The patient's son notes he is also concerned the   patient may try to pull the PEG tube, reportedly had been receiving   tube feeds with the same. On evaluation in the emergency department   tonight, initial recorded vital signs were blood pressure 123/72, heart   rate 94, respiratory rate 16, O2 saturation 96% on room air. Labs   showed elevated BUN 22, creatinine 1.50. Last creatinine 1.25 earlier   in the day on 08/30/2017. The patient is now seen for admission to the   hospitalist service for continued evaluation and treatment. PAST MEDICAL HISTORY   1. GE junction carcinoma. 2. Dysphagia. 3. Stage III chronic kidney disease. 4. GERD. 5. Arthritis. 6. Chronic back pain. 7. Left hip pain. 8. Hypertension. 9. Hyperlipidemia. 10. Restless leg syndrome. PAST SURGICAL HISTORY: EGD, removal of esophageal stent, PEG   tube placement, esophageal dilation on 08/08/2017. EGD, esophageal   stent placement on 07/31/2017. Esophagoscopy 07/24/2017. Upper GI   endoscopy and biopsy 06/28/2017. EGD with findings of esophagitis,   gastritis, gastroduodenitis 04/29/2010. Insertion of right IJ Port-A-  Cath 08/02/2017. Left total knee replacement 12/14/2011, right total   knee replacement 10/13/2010. MEDICATIONS   1. Chlorpheniramine 4 mg 1 tablet p.o. b.i.d. p.r.n.   2. Aspirin 81 mg p.o. daily.    3. Calcium with vitamin D 1 tablet p.o. daily. 4. Vitamin B12 1000 mcg p.o. daily. 5. Decadron 4 mg 2 tablets p.o. 2 days after each chemotherapy on   Thursday, Friday. 6. Finasteride 5 mg p.o. daily. 7. Flonase 50 mcg per actuation nasal spray 1 spray both nostrils b.i.d.   8. Gabapentin 800 mg p.o. t.i.d.   9. Tierra-Q 8 Billion Cells 1 capsule p.o. daily. 10. Lisinopril 5 mg p.o. daily. 11. Loratidine 10 mg p.o. daily. 12. Lorazepam 0.5 mg 1 tablet p.o. every 8 hours p.r.n.   13. TwoCal HN 5 cans per G-tube daily. 14. Ondansetron 80 mg 1 tablet p.o. every 8 hours p.r.n.   15. Pantoprazole 40 mg p.o. daily. 16. Compazine 10 mg p.o. q.i.d. 17. Ranitidine 150 mg 1 tablet p.o. b.i.d. p.r.n. 18. Sucralfate 100 mg/mL suspension 10 mL p.o. q.i.d. p.r.n.   19. Tamsulosin 0.8 mg p.o. daily. 20. Tramadol 50 mg 1 tablet p.o. every 8 hours p.r.n. ALLERGIES: NO KNOWN DRUG ALLERGIES. SOCIAL HISTORY: Former smoker of cigarettes, before he quit 1970. Positive occasional alcohol. No report of illicit drugs. . FAMILY HISTORY: Heart disease sister. REVIEW OF SYSTEMS: Unable to obtain a complete review of   systems as the patient is confused, not answering questions   appropriately. PHYSICAL EXAMINATION   VITAL SIGNS: Temperature 98.0 degrees Fahrenheit, blood pressure   114/58, heart rate 91, respiratory rate 18, O2 saturation 100% room   air. Recorded weight 180 pounds (82 kg). Recorded height 6 feet 0   inches tall. GENERAL: Elderly male in no acute respiratory distress. PSYCHIATRIC: The patient was initially asleep, but aroused to loud   verbal stimuli. Oriented x1 to person only, otherwise confused,   anxious, agitated, initially trying to move out of bed. NEUROLOGIC: GCS 13 (E4 V3 M6), spontaneous eye opening,   confused speech, follows some commands, moves extremities x4,   sensation grossly intact without slurred speech, facial droop.   Does not   cooperate in order to check prontor drift. HEENT: Normocephalic, atraumatic. PERRLA. EOMs intact. Sclerae   anicteric, conjunctivae clear. Nares patent. Oropharynx clear. Tongue   midline, not edematous. NECK: Supple without lymphadenopathy, JVD, carotid   bruit, thyromegaly. LYMPH: Negative for cervical, supraclavicular adenopathy. LUNGS: Clear to auscultation bilaterally. CARDIOVASCULAR: Heart regular rate and rhythm, normal S1, S2,   without murmurs, rubs or gallops. GI: Abdomen soft, nontender, nondistended, normoactive bowel   sounds. No rebound, guarding or rigidity. No auscultated abdominal   bruits. No pulsatile mass. BACK: No CVA tenderness. No stepoff, deformity. MUSCULOSKELETAL: No acute palpable bony deformity, neck or calf   tenderness. VASCULAR: 2+ radial, 1+ dorsalis pedis pulses, without cyanosis,   clubbing. Trace lower extremity nonpitting edema. SKIN: Warm and dry. LABORATORY:  I reviewed. Sodium 131, potassium 4.6, chloride 97,   CO2 21, BUN of 22, creatinine 1.50, glucose 251, anion gap is 13,   calcium 8.5, GFR 44, total bilirubin 0.5, total protein 6.6, albumin 3.0,   ALT 53, AST 37, alk phosphatase 120. Troponin-I less than 0.04. WBC   3.9, hemoglobin 10.2, hematocrit 29.1, platelets 776, neutrophils 76%. INR 1.1. CT code neuro head without contrast showed no acute intracranial   abnormality. Twelve-lead EKG: Normal sinus rhythm, left anterior fascicular block,   ST changes anterior septal leads, 89 beats/minute. IMPRESSION AND PLAN   1. Altered mental status. Exact etiology is unknown. However, suspect   possible acute metabolic encephalopathy with dehydration. I would   like to obtain a UA in order to rule out possible UTI in elderly patient. Otherwise there are concerns  for possible side effects of   chemotherapy.  Will consult with the patient's oncologist. Alices Sovereign on   neurovascular checks, fall precautions, consult with neurologist.   Recommend hold on Ativan to eliminate as cause for noted change in   mental status during time of chemotherapy. Will also check   acetaminophen, salicylate, and ammonia levels. Check TSH. 2. Dehydration with history of stage III chronic kidney disease. Continue with IV fluid hydration, resuscitation, monitor the patient's   mental status and response. 3. Type 2 diabetes mellitus, with hyperglycemia, new onset, likely   related to recent steroids with Decadron. May be steroid-induced. Place on Humalog insulin correction coverage, scheduled Accu-Cheks,   check hemoglobin A1c level. Informed the patient's family of noted   findings. 4. Anemia. Repeat CBC. 5. Leukocytosis. Repeat WBC. 6. Generalized weakness/debility. Place on fall precautions. Consult   with physical and occupational therapist.   7. Gastroesophageal junction adenocarcinoma. Continue workup and   plan as noted above. Consult with oncologist.   8. Hypertension. Resume back on home medications. Blood pressure is   currently controlled. 9. VTE prophylaxis. SCDs bilateral lower extremities. MD Julius Cope / Rivas Gillespie   D:  08/31/2017   01:36   T:  08/31/2017   03:44   Job #:  [id number]

## 2017-08-31 NOTE — PROGRESS NOTES
Speech Pathology bedside swallow evaluation/discharge  Patient: Erlinda Camacho Sr. (80 y.o. male)  Date: 8/31/2017  Primary Diagnosis: Altered mental status        Precautions:   Bed Alarm, Fall    ASSESSMENT :  Patient only agreeable to minimal PO trials secondary to confusion and increasing agitation. Based on the objective data described below, the patient presents with a functional oropharyngeal swallow. Patient with timely/complete mastication, timely swallow initiation, functional hyolaryngeal elevation/excursion, and no overt s/s aspiration observed. Patient with significant confusion and oriented to person only. Patient became agitated as session progressed, however participated given mod encouragement/cues. This is a change from baseline per chart review, and ?etiology of cognitive deficits. Will follow for integrated language evaluation pending etiology of deficits. Skilled therapy provided by a speech-language pathologist is not indicated at this time. PLAN :  Recommendations:  --Continue mechanical soft/thin liquid diet (baseline per last admission secondary to GE junction adenocarcinoma)  --SLP to follow for integrated language evaluation pending etiology of deficits  Discharge Recommendations: None     SUBJECTIVE:   Patient stated This is not [place]. This is the other hospital. Patient alert, cooperative given mod encouragement. Confused, increasing agitation. Oriented to person only.     OBJECTIVE:     Past Medical History:   Diagnosis Date    Acid reflux     Arthritis     right  left knees    Back pain, chronic 4/11/2014    Cancer ([facility] [address] 2017    esophegeal    Decreased hearing of both ears 4/5/2017    DNR (do not resuscitate) 4/5/2017    Gastroesophageal reflux disease without esophagitis 6/13/2017    GERD (gastroesophageal reflux disease)     Hip pain, left 4/11/2014    Hypercholesterolemia     Hypertension     Need for varicella vaccine 4/11/2014    Physical deconditioning 8/17/2017    Rash of entire body 9/15/2016    Restless leg syndrome      Past Surgical History:   Procedure Laterality Date    ABDOMEN SURGERY PROC UNLISTED  1980's    gall bladder    ABDOMEN SURGERY PROC UNLISTED  15 yrs ago    right hernia repair    HX HEENT      EYE SURGERY 40 YRS AGO.  HX ORTHOPAEDIC      fluid removed both knees    HX ORTHOPAEDIC  2010    bilateral knee replacements     Prior Level of Function/Home Situation:      Diet prior to admission: mechanical soft/thin liquid with G tube  Current Diet:  NPO with G tube   Cognitive and Communication Status:  Neurologic State: Alert, Confused  Orientation Level: Oriented to person, Disoriented to time, Disoriented to situation, Disoriented to place  Cognition: Decreased command following, Decreased attention/concentration  Perception: Appears intact  Perseveration: No perseveration noted  Safety/Judgement: Decreased awareness of environment, Decreased awareness of need for assistance, Decreased awareness of need for safety, Decreased insight into deficits  Oral Assessment:  Oral Assessment  Labial: No impairment  Dentition: Natural;Limited  Oral Hygiene: white coating on tongue  Lingual: No impairment  Velum: No impairment  Mandible: No impairment  P.O. Trials:  Patient Position: sitting in chair  Vocal quality prior to P.O.: No impairment  Consistency Presented: Ice chips; Thin liquid;Puree;Mechanical soft  How Presented: Self-fed/presented;Cup/sip; Successive swallows;SLP-fed/presented;Spoon     Bolus Acceptance: No impairment  Bolus Formation/Control: No impairment     Propulsion: No impairment  Oral Residue: None  Initiation of Swallow: No impairment  Laryngeal Elevation: Functional  Aspiration Signs/Symptoms: None  Pharyngeal Phase Characteristics: No impairment, issues, or problems   Effective Modifications: None  Cues for Modifications: None       Oral Phase Severity: No impairment  Pharyngeal Phase Severity : No impairment  NOMS:   The NOMS functional outcome measure was used to quantify this patient's level of swallowing impairment. Based on the NOMS, the patient was determined to be at level 5 for swallow function     G Codes: In compliance with CMSs Claims Based Outcome Reporting, the following G-code set was chosen for this patient based the use of the NOMS functional outcome to quantify this patient's level of swallowing impairment. Using the NOMS, the patient was determined to be at level 5 for swallow function which correlates with the CJ= 20-39% level of severity. Based on the objective assessment provided within this note, the current, goal, and discharge g-codes are as follows:    Swallow  Swallowing:   Swallow Current Status CJ= 20-39%   Swallow Goal Status CJ= 20-39%   Swallow D/C Status CJ= 20-39%        NOMS Swallowing Levels:  Level 1 (CN): NPO  Level 2 (CM): NPO but takes consistency in therapy  Level 3 (CL): Takes less than 50% of nutrition p.o. and continues with nonoral feedings; and/or safe with mod cues; and/or max diet restriction  Level 4 (CK): Safe swallow but needs mod cues; and/or mod diet restriction; and/or still requires some nonoral feeding/supplements  Level 5 (CJ): Safe swallow with min diet restriction; and/or needs min cues  Level 6 (CI): Independent with p.o.; rare cues; usually self cues; may need to avoid some foods or needs extra time  Level 7 ([address] [place] [address]): Independent for all p.o.  DIOMEDES. (2003). National Outcomes Measurement System (NOMS): Adult Speech-Language Pathology User's Guide.        Pain:Pain Scale 1: Numeric (0 - 10)  Pain Intensity 1: 0     After treatment:   [x] Patient left in no apparent distress sitting up in chair  [] Patient left in no apparent distress in bed  [x] Call bell left within reach  [x] Nursing notified  [x] Sitter present  [x] Bed alarm activated    COMMUNICATION/EDUCATION:   The patients plan of care including findings, recommendations, and recommended diet changes were discussed with: Registered Nurse.  [] Patient/family have participated as able and agree with findings and recommendations. [x] Patient is unable to participate in plan of care at this time.     Thank you for this referral.  SHANNA Davis  Time Calculation: 19 mins

## 2017-08-31 NOTE — ED TRIAGE NOTES
Triage Note: Pt arrives via EMS from home for altered mental status. Daughter checked on pt at 2000, pt was confused, she attempted to start PEG tube feeding, pt began pulling at lines, daughter stated that was not normal for pt. BGL en route 259, VSS. Currently pt is oriented to self, usually a/o x4, CODE S initiated. Pt did receive chemo tx today for stomach and esophageal CA.

## 2017-08-31 NOTE — PROGRESS NOTES
Radiation treatment 6 of 25 currently planned radiation treatments delivered to the [place] [facility]

## 2017-08-31 NOTE — PROGRESS NOTES
Problem: Mobility Impaired (Adult and Pediatric)  Goal: *Acute Goals and Plan of Care (Insert Text)  Physical Therapy Goals  Initiated 8/31/2017  1. Patient will move from supine to sit and sit to supine in bed with supervision/set-up within 5 day(s). 2. Patient will transfer from bed to chair and chair to bed with supervision/set-up using the least restrictive device within 5 day(s). 3. Patient will perform sit to stand with supervision/set-up within 5 day(s). 4. Patient will ambulate with supervision/set-up for 100 feet with the least restrictive device within 5 day(s). PHYSICAL THERAPY EVALUATION  Patient: Makeda Gallego Sr. (80 y.o. male)  Date: 8/31/2017  Primary Diagnosis: Altered mental status  Precautions:   Bed Alarm, Fall      ASSESSMENT :  Based on the objective data described below, the patient presents with generally decreased strength, balance, ROM, and mentation resulting in impaired functional mobility and limited independence-baseline unknown 2* poor historian. Pt does report possibly using DME (walker vs cane). This date, pt with sitter and tangential in conversation-he does not comprehend that he is in the hospital (states his DTR is in hospital). .. He attempts to get out of his chair multiple times and sounds chair alarm. He is generally pleasant though demonstrates some agitation towards end of session. Pt likely to require SNF level of care at D/C-unsure of support systems or PLOF however, so this may change with new info. Pt left with sitter present in NAD following transfers and brief walk with assist x1 and safety concerns with gait belt and UE support on IV pole. Recommend RW trial next session for increased activity tolerance and safety. Patient will benefit from skilled intervention to address the above impairments.   Patients rehabilitation potential is considered to be Fair  Factors which may influence rehabilitation potential include:   [ ]         None noted  [X] Mental ability/status  [ ]         Medical condition  [ ]         Home/family situation and support systems  [ ]         Safety awareness  [ ]         Pain tolerance/management  [ ]         Other:        PLAN :  Recommendations and Planned Interventions:  [X]           Bed Mobility Training             [ ]    Neuromuscular Re-Education  [X]           Transfer Training                   [ ]    Orthotic/Prosthetic Training  [X]           Gait Training                         [ ]    Modalities  [X]           Therapeutic Exercises           [ ]    Edema Management/Control  [X]           Therapeutic Activities            [X]    Patient and Family Training/Education  [ ]           Other (comment):     Frequency/Duration: Patient will be followed by physical therapy  5 times a week to address goals. Discharge Recommendations: Rehab  Further Equipment Recommendations for Discharge: TBD       SUBJECTIVE:   Patient stated [place] the one [place]? No, not [facility]. .. What's the one [place]. ..  Pt perseverates on hospitals and where he is. .. States this isnt a hospital, someone lives here. .. OBJECTIVE DATA SUMMARY:   HISTORY:    Past Medical History:   Diagnosis Date    Acid reflux      Arthritis       right  left knees    Back pain, chronic 4/11/2014    Cancer ([facility] [address] 2017     esophegeal    Decreased hearing of both ears 4/5/2017    DNR (do not resuscitate) 4/5/2017    Gastroesophageal reflux disease without esophagitis 6/13/2017    GERD (gastroesophageal reflux disease)      Hip pain, left 4/11/2014    Hypercholesterolemia      Hypertension      Need for varicella vaccine 4/11/2014    Physical deconditioning 8/17/2017    Rash of entire body 9/15/2016    Restless leg syndrome       Past Surgical History:   Procedure Laterality Date    ABDOMEN SURGERY PROC UNLISTED   1980's     gall bladder    ABDOMEN SURGERY PROC UNLISTED   15 yrs ago     right hernia repair    HX HEENT         EYE SURGERY 40 YRS AGO.     HX ORTHOPAEDIC         fluid removed both knees    HX ORTHOPAEDIC   2010     bilateral knee replacements     Prior Level of Function/Home Situation: unknown though pt reports possibly using RW vs cane  Personal factors and/or comorbidities impacting plan of care: AMS/poor memory           EXAMINATION/PRESENTATION/DECISION MAKING:   Critical Behavior:  Neurologic State: Alert, Confused  Orientation Level: Oriented to person, Disoriented to time, Disoriented to situation, Disoriented to place  Cognition: Decreased command following, Decreased attention/concentration  Safety/Judgement: Decreased awareness of environment, Decreased awareness of need for assistance, Decreased awareness of need for safety, Decreased insight into deficits  Hearing: Auditory  Auditory Impairment: Hard of hearing, bilateral  Range Of Motion:  AROM: Generally decreased, functional           PROM:  (NT)           Strength:    Strength: Generally decreased, functional                    Tone & Sensation:   Tone: Normal              Sensation:  (NT)               Coordination:  Coordination: Generally decreased, functional  Vision:   Acuity:  (able to read newspaper)  Functional Mobility:  Bed Mobility:     Supine to Sit:  (received seated EOB)        Transfers:  Sit to Stand: Contact guard assistance; Additional time  Stand to Sit: Contact guard assistance; Additional time (cues for alignment and reach back)        Bed to Chair: Contact guard assistance              Balance:   Sitting: Intact  Standing: Impaired; With support  Standing - Static: Fair  Standing - Dynamic : Fair  Ambulation/Gait Training:  Distance (ft): 12 Feet (ft) (x2)  Assistive Device: Gait belt (pt pushing IV pole)  Ambulation - Level of Assistance: Minimal assistance; Additional time (safety concerns)     Gait Description (WDL): Exceptions to WDL  Gait Abnormalities: Decreased step clearance;Shuffling gait; Path deviations        Base of Support: Narrowed; Center of gravity altered;Shift to right     Speed/Rocio: Shuffled;Pace decreased (<100 feet/min)                    Stairs:     Stairs - Level of Assistance:  (NT)     Functional Measure:  Tinetti test:      Sitting Balance: 1  Arises: 1  Attempts to Rise: 2  Immediate Standing Balance: 1  Standing Balance: 1  Nudged: 0  Eyes Closed: 0  Turn 360 Degrees - Continuous/Discontinuous: 0  Turn 360 Degrees - Steady/Unsteady: 0  Sitting Down: 1  Balance Score: 7  Indication of Gait: 0  R Step Length/Height: 0  L Step Length/Height: 0  R Foot Clearance: 0  L Foot Clearance: 0  Step Symmetry: 1  Step Continuity: 0  Path: 1  Trunk: 0  Walking Time: 0  Gait Score: 2  Total Score: 9         Tinetti Test and G-code impairment scale:  Percentage of Impairment CH     0%    CI     1-19% CJ     20-39% CK     40-59% CL     60-79% CM     80-99% CN      100%   Tinetti  Score 0-28 28 23-27 17-22 12-16 6-11 1-5 0          Tinetti Tool Score Risk of Falls  <19 = High Fall Risk  19-24 = Moderate Fall Risk  25-28 = Low Fall Risk  Tinetti ME. Performance-Oriented Assessment of Mobility Problems in Elderly Patients. [facility] 66; [id number]. (Scoring Description: PT Bulletin Feb. 10, 1993)     Older adults: Cindy Mccauley et al, 2009; n = 1000 [facility] elderly evaluated with ABC, SUZANNA, ADL, and IADL)  · Mean SUZANNA score for males aged 69-68 years = 26.21(3.40)  · Mean SUZANNA score for females age 69-68 years = 25.16(4.30)  · Mean SUZANNA score for males over 80 years = 23.29(6.02)  · Mean SUZANNA score for females over 80 years = 17.20(8.32)            G codes: In compliance with CMSs Claims Based Outcome Reporting, the following G-code set was chosen for this patient based on their primary functional limitation being treated: The outcome measure chosen to determine the severity of the functional limitation was the Tinetti with a score of 9/28 which was correlated with the impairment scale.       · Mobility - Walking and Moving Around:               - CURRENT STATUS: CL - 60%-79% impaired, limited or restricted               - GOAL STATUS:           CK - 40%-59% impaired, limited or restricted               - D/C STATUS:                       ---------------To be determined---------------      Pain:  Pain Scale 1: Numeric (0 - 10)  Pain Intensity 1: 0              Activity Tolerance:   NAD  Please refer to the flowsheet for vital signs taken during this treatment. After treatment:   [X]         Patient left in no apparent distress sitting up in chair  [ ]         Patient left in no apparent distress in bed  [X]         Call bell left within reach  [X]         Nursing notified  [X]         Sitter present  [X]         Bed alarm activated      COMMUNICATION/EDUCATION:   The patients plan of care was discussed with: Registered Nurse.  [X]         Fall prevention education was provided and the patient/caregiver indicated understanding. [X]         Patient/family have participated as able in goal setting and plan of care. [X]         Patient/family agree to work toward stated goals and plan of care. [ ]         Patient understands intent and goals of therapy, but is neutral about his/her participation. [ ]         Patient is unable to participate in goal setting and plan of care.      Thank you for this referral.  Elis Murphy   Time Calculation: 13 mins

## 2017-08-31 NOTE — PROGRESS NOTES
TRANSFER - OUT REPORT:    Verbal report given to Yamilex(name) on [employer] Sr.  being transferred to [place](unit) for routine progression of care       Report consisted of patients Situation, Background, Assessment and   Recommendations(SBAR). Information from the following report(s) SBAR, Intake/Output, MAR, Recent Results and Cardiac Rhythm NSR was reviewed with the receiving nurse. Lines:   Venous Access Device 08/02/17 Other (comment) (Active)   Central Line Being Utilized No 8/31/2017 11:51 AM   Dressing Type Bandaid 8/30/2017  4:00 PM   Action Taken Blood drawn 8/30/2017 11:00 AM   Date Accessed (Medial Site) 08/30/17 8/30/2017 11:00 AM   Access Time (Medial Site) 1129 8/30/2017 11:00 AM   Access Needle Size (Site #1) 20 G 8/30/2017 11:00 AM   Access Needle Length (Medial Site) 1 inch 8/30/2017 11:00 AM   Positive Blood Return (Medial Site) Yes 8/30/2017 11:00 AM   Action Taken (Medial Site) Flushed; De-accessed 8/30/2017  4:00 PM       Peripheral IV 08/31/17 Right Wrist (Active)   Site Assessment Clean, dry, & intact 8/31/2017 11:51 AM   Phlebitis Assessment 0 8/31/2017 11:51 AM   Infiltration Assessment 0 8/31/2017 11:51 AM   Dressing Status Clean, dry, & intact 8/31/2017 11:51 AM   Dressing Type Tape;Transparent 8/31/2017 11:51 AM   Hub Color/Line Status Pink; Infusing 8/31/2017 11:51 AM   Action Taken Open ports on tubing capped 8/31/2017 11:51 AM   Alcohol Cap Used Yes 8/31/2017 11:51 AM        Opportunity for questions and clarification was provided.       Patient transported with:   [employer]

## 2017-08-31 NOTE — PROGRESS NOTES
Hospitalist Progress Note  Pavel Strange MD  Office: [phone]  Cell: [phone]      Date of Service:  2017  NAME:  Brittany Watts Sr.  :  1931  MRN:  [id number]      Admission Summary:   81 yo male with PMHx of Esophageal Ca with Mets, undergoing Chemo and RTx, HTN, HLD, RLS admitted from home for agitation and confusion. Pt has been taking compazine and ativan for cancer related nausea and vomiting. Pt had similar episode 2 weeks ago as well. Daughter takes care of patient home but stating that it has become burdensome for her due to his memory issues and confusions. 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  - reorient  - will add Seroquel HS and Haldol PRN    BPH   - c/w Proscar and Flomax    Dysphagia due to Esophageal Ca  - attempted Mechanical Soft but did not tolerated  - Speech eval   - Tube feeding as ordered    Code status: DNR  DVT prophylaxis: SCDs    Care Plan discussed with: Patient/Family and Nurse  Disposition: [address] [place] [address] Problems  Date Reviewed: 2017          Codes Class Noted POA    * (Principal)Altered mental status ICD-10-CM: R41.82  ICD-9-CM: 780.97  2017 Unknown                Review of Systems:   A comprehensive review of systems was negative except for that written in the HPI. Vital Signs:    Last 24hrs VS reviewed since prior progress note.  Most recent are:  Visit Vitals    /72 (BP 1 Location: Right arm, BP Patient Position: Post activity)    Pulse 85    Temp 97.8 °F (36.6 °C)    Resp 18    Wt 81.4 kg (179 lb 8 oz)    SpO2 100%    BMI 24.34 kg/m2         Intake/Output Summary (Last 24 hours) at 08/31/17 1532  Last data filed at 08/31/17 1226   Gross per 24 hour   Intake           297.92 ml   Output              400 ml   Net          -102.08 ml        Physical Examination:             Constitutional:  No acute distress, cooperative, pleasant    ENT:  Oral mucous moist   Resp:  CTA bilaterally. CV:  Regular rhythm, normal rate    GI:  Soft, non distended, non tender. normoactive bowel sounds, PEG in place    Musculoskeletal:  No edema, warm, 2+ pulses throughout    Neurologic:  Moves all extremities. AAOx2     Psych:  paranoid, emotional, poor judgement and insight       Data Review:    Review and/or order of clinical lab test  Review and/or order of tests in the radiology section of CPT  Review and/or order of tests in the medicine section of [facility]      Labs:     Recent Labs      08/31/17   0352  08/30/17 2240   WBC  4.6  3.9*   HGB  10.5*  10.2*   HCT  30.6*  29.1*   PLT  318  276     Recent Labs      08/31/17   0926  08/31/17   0352  08/30/17   2240  08/30/17   1138   NA   --   131*  131*  131*   K  4.2  5.7*  4.6  4.3   CL   --   99  97  96*   CO2   --   24  21  27   BUN   --   22*  22*  22*   CREA   --   1.33*  1.50*  1.25   GLU   --   110*  251*  100   CA   --   9.0  8.5  8.6   MG   --   2.1   --    --    PHOS   --   2.7   --    --      Recent Labs      08/30/17 2240   SGOT  37   ALT  53   AP  120*   TBILI  0.5   TP  6.6   ALB  3.0*   GLOB  3.6     Recent Labs      08/30/17 2245   INR  1.1      No results for input(s): FE, TIBC, PSAT, FERR in the last 72 hours. Lab Results   Component Value Date/Time    Folate 11.2 09/16/2016 11:03 AM      No results for input(s): PH, PCO2, PO2 in the last 72 hours.   Recent Labs      08/30/17 2240   TROIQ  <0.04     Lab Results   Component Value Date/Time    Cholesterol, total 197 04/05/2017 03:43 PM HDL Cholesterol 40 04/05/2017 03:43 PM    LDL, calculated 154 09/16/2016 11:03 AM    Triglyceride 124 09/16/2016 11:03 AM    CHOL/HDL Ratio 4.5 10/27/2010 07:50 AM     Lab Results   Component Value Date/Time    Glucose (POC) 113 08/31/2017 12:32 PM    Glucose (POC) 108 08/31/2017 07:21 AM    Glucose (POC) 138 08/31/2017 01:53 AM    Glucose (POC) 118 08/02/2017 08:19 AM     Lab Results   Component Value Date/Time    Color YELLOW/STRAW 08/31/2017 12:31 PM    Appearance CLEAR 08/31/2017 12:31 PM    Specific gravity 1.010 08/31/2017 12:31 PM    pH (UA) 6.5 08/31/2017 12:31 PM    Protein NEGATIVE  08/31/2017 12:31 PM    Glucose NEGATIVE  08/31/2017 12:31 PM    Ketone NEGATIVE  08/31/2017 12:31 PM    Bilirubin NEGATIVE  08/31/2017 12:31 PM    Urobilinogen 1.0 08/31/2017 12:31 PM    Nitrites NEGATIVE  08/31/2017 12:31 PM    Leukocyte Esterase NEGATIVE  08/31/2017 12:31 PM    Epithelial cells FEW 08/31/2017 12:31 PM    Bacteria NEGATIVE  08/31/2017 12:31 PM    WBC 0-4 08/31/2017 12:31 PM    RBC  08/31/2017 12:31 PM         Medications Reviewed:     Current Facility-Administered Medications   Medication Dose Route Frequency    sodium chloride (NS) flush 5-10 mL  5-10 mL IntraVENous Q8H    sodium chloride (NS) flush 5-10 mL  5-10 mL IntraVENous PRN    aspirin delayed-release tablet 81 mg  81 mg Oral DAILY    finasteride (PROSCAR) tablet 5 mg  5 mg Oral DAILY    pantoprazole (PROTONIX) tablet 40 mg  40 mg Oral ACB    tamsulosin (FLOMAX) capsule 0.8 mg  0.8 mg Oral DAILY    traMADol (ULTRAM) tablet 50 mg  50 mg Oral Q8H PRN    sucralfate (CARAFATE) 100 mg/mL oral suspension 1 g  1 g Oral QID PRN    0.9% sodium chloride infusion  125 mL/hr IntraVENous CONTINUOUS    glucose chewable tablet 16 g  4 Tab Oral PRN    dextrose (D50W) injection syrg 12.5-25 g  12.5-25 g IntraVENous PRN    glucagon (GLUCAGEN) injection 1 mg  1 mg IntraMUSCular PRN    insulin lispro (HUMALOG) injection   SubCUTAneous AC&HS ______________________________________________________________________  EXPECTED LENGTH OF STAY: 3d 7h  ACTUAL LENGTH OF STAY:          Felipe Ivy MD

## 2017-08-31 NOTE — PROGRESS NOTES
NUTRITION COMPLETE ASSESSMENT    RECOMMENDATIONS:   1. Agree with resumption of goal tube feeding:  Two Rubens HN, 240 mL (1 can) 5x/day + 100 mL flush before and after each  -- Run bolus feeding on pump @ 480 mL/hr (ie; over 30 minutes)  -- If pt complains of nausea, please run tube feeding at 240 mL/hr (ie; over one hour)    2. Daily weights while in the hospital     Interventions/Plan:   Food/Nutrient Delivery: EN support via PEG as primary source of nutrition     Note: each feeding to provide 52 gm CHO. Assessment:   Reason for Assessment:   [x]BPA/MST Referral     Diet:  Mech Soft, thin liquids  Tube Feeding: Two Rubens HN, 240 mL (1 can) 4x/day + 100 mL flush before and after each    Nutritionally Significant Medications: [x] Reviewed & Includes: 0.9% sodium chloride @ 125 mL/hr; humalog correction scale; protonix daily; carafate 4x/day     Subjective:  Pt pleasantly confused. Admits to remembering RD from previous admission. Sitter at the bedside. Objective:  Chart reviewed, discussed with RN and team during interdisciplinary rounds. Pt admitted with AMS. PMHx: esophageal cancer with mets to the stomach (+chemo, XRT), esophageal stent placement, PEG placement (8/8/17), GERD, HTN, others noted. Pt with severe weight loss prior to previous admission to the hospital 3-4 weeks ago (12%), but since he has maintained w/in 1 kg. Goal is for 5 cans of Two Rubens HN per day. Noted he is followed closely by Outpatient RD with Medical Oncology Office and has been getting ~3 cans/day secondary to XRT and schedule at home. Goal feeding orders resumed today. He ate bites of breakfast (eggs, muffin), but vomited. Diet advanced again this afternoon per SLP evaluation (Mech Soft, thin liquids). PEG feedings will remain his primary source of nutrition.     Goal tube feeding to provide 2375 kcal, 100 g protein and 2030 mL total free water (tf + flush)-- meeting 97% and 100% of estimated kcal and protein needs, respectively. Estimated Nutrition Needs:   Kcals/day: 4338 Kcals/day (0188-8888 kcal/day (30-32 kcal/kg))  Protein: 98 g (1.2 g/kg (increase prn if renal function allows))  Fluid: 2442 ml (1 mL/kcal)     Based On: Kcal/kg - specify (Comment)  Weight Used: Actual wt (81.4 kg)    Pt expected to meet estimated nutrient needs:  [x]   Yes    Nutrition Diagnosis:   1. Swallowing difficulty related to dysphagia, esophageal cancer with stomach mets as evidenced by PEG tube dependency to meet estimated needs    Goals:     Pt to meet at least 90% of estimated needs via enteral nutrition support over the next 5-7 days     Monitoring & Evaluation:    - Total energy intake, Enteral/parenteral nutrition intake   - Weight/weight change     Previous Nutrition Goals Met:  N/A  Previous Recommendations:      N/A    Education & Discharge Needs:   [x] None Identified   [] Identified and addressed    [x] Participated in care plan, discharge planning, and/or interdisciplinary rounds        Cultural, Denominational and ethnic food preferences identified:  NONE      Skin Integrity: []Intact  [x]Other: see WOCN assessment  Edema: [x]None []Other  Last BM: PTA  Food Allergies: [x]None []Other    Anthropometrics:    Weight Loss Metrics 8/31/2017 8/30/2017 8/30/2017 8/30/2017 8/23/2017 8/23/2017 8/17/2017   Today's Wt 179 lb 8 oz - 180 lb 12.8 oz - 179 lb 9.6 oz 179 lb 3.2 oz 188 lb 12.8 oz   BMI - 24.34 kg/m2 - 24.52 kg/m2 24.36 kg/m2 24.3 kg/m2 25.61 kg/m2      Last 3 Recorded Weights in this Encounter    08/31/17 0410   Weight: 81.4 kg (179 lb 8 oz)      Weight Source: Standing scale (comment)   ,    Body mass index is 24.34 kg/(m^2).   IBW : 80.7 kg (178 lb),    Usual Body Weight: 95.3 kg (210 lb),      Labs:    Lab Results   Component Value Date/Time    Sodium 131 08/31/2017 03:52 AM    Potassium 4.2 08/31/2017 09:26 AM    Chloride 99 08/31/2017 03:52 AM    CO2 24 08/31/2017 03:52 AM    Glucose 110 08/31/2017 03:52 AM    BUN 22 08/31/2017 03:52 AM    Creatinine 1.33 08/31/2017 03:52 AM    Calcium 9.0 08/31/2017 03:52 AM    Magnesium 2.1 08/31/2017 03:52 AM    Phosphorus 2.7 08/31/2017 03:52 AM    Albumin 3.0 08/30/2017 10:40 PM     Lab Results   Component Value Date/Time    Hemoglobin A1c 5.3 08/30/2017 10:40 PM     Lab Results   Component Value Date/Time    Glucose 110 08/31/2017 03:52 AM    Glucose (POC) 113 08/31/2017 12:32 PM      Lab Results   Component Value Date/Time    ALT (SGPT) 53 08/30/2017 10:40 PM    AST (SGOT) 37 08/30/2017 10:40 PM    Alk.  phosphatase 120 08/30/2017 10:40 PM    Bilirubin, direct 0.1 08/23/2017 11:37 AM    Bilirubin, total 0.5 08/30/2017 10:40 PM      Lori Seymour, [address] [facility]

## 2017-08-31 NOTE — TELEPHONE ENCOUNTER
[id number] [facility] Nutrition Therapy   [phone]    Nutrition    Mr. Lo's daughter [facility] called this morning to let me know that he has been admitted to [facility]. He received chemotherapy yesterday and last night he had AMS and pulling at his feeding pump. She states that her brother Deena Damon ([phone]) has POA. She says she isn't sure about continuing chemotherapy and wonders if he needs to be placed in a SNF. Differed conversation to Dr. Adrianne Hong and  regarding treatment plan and placement.       Kristie Almonte, [address], [address] [place] [address] , [phone]

## 2017-08-31 NOTE — WOUND CARE
Wound Consult:  New Patient Visit. Chart reviewed. Spoke with patients nurse,  [facility] to hand off on visit; in with Noemi who is sitting with patient; he was up in chair. A total care bed with foam mattress is in place. Assessment:  Distal gluteal cleft - dry resurfaced skin; rough scab like feel; no discoloration of skin surrounding; most likely had some moisture issue in fold of skin that is essentially resolved. Buttocks without discoloration. Heels without discoloration. Treatment:  Aloe vesta ointment applied to skin. Brought patient waffle cushion for sitting. Recommendations:  1. Minimize layers of linen/pads under patient to optimize support surface. 2. Reposition: continue to turn and reposition approximately every 2 hours; float heels as needed while in bed. 3. Manage incontinence - check ~ every 2 hours; routine incontinence care to include skin/perineal cleanser; moisturizer and barrier with each episode; use appropriate sized briefs if needed. 4. Continue to monitor nutrition, pain, and skin risk scale, and skin assessment. Plan:  Please re-consult if any WOC concerns arise.   Amina Gardner, [address] Office [phone]  Pager [phone]

## 2017-08-31 NOTE — PROGRESS NOTES
CM noted PT recommending Rehab, referral sent via [place] to [facility]. Diana Galicia MSA, RN, CRM. CM reviewed chart and noted patient was discharged from [facility] on 8/11/17. Patient was admitted from the ER with sudden onset of altered mental status. Patient has a past history of GE junction, esophageal adenocarcinoma with metastasis, hypertension, chronic pain, GERD, arthritis, hyperlipidemia, restless leg syndrome. CM met with patient during IDR, he was pleasant, confused and has a sitter in the room. CM called daughter Marivel Ibarra [phone] to discuss discharge planning. Patient lives with his daughter in her private residence. He needs assistance with his ADL's and IADL's. Patient has a new J- tube used for nocturnal feedings. [address] is supplying formula/supplies. Patient uses a cane around the house and a wheelchair for outdoor trips. Patient recently started chemotherapy, he has received two rounds so far. Per daughter, he will receive 15 radiation treatments and five chemo rounds. [facility] is following patient at home for SN/PT. Daughter expressed that it was tasking for her to take care of her father at home. CM assured daughter that patient was going to have an evaluation from PT/OT and they would recommend the appropriate disposition for her father. She also said she would prefer her father to go to a SNF close to [facility] where she lives. SEC Watch. Patient will need a [facility] resumption order at discharge. CM will continue to follow. Diana Galicia MSA, RN, CRM. Care Management Interventions  PCP Verified by CM: Yes (Dr. Phyllis Smalls)  Mode of Transport at Discharge:  Other (see comment) (Private car)  Transition of Care Consult (CM Consult): Discharge Planning  MyChart Signup: No  Discharge Durable Medical Equipment: No  Health Maintenance Reviewed: Yes  Physical Therapy Consult: Yes  Occupational Therapy Consult: Yes  Speech Therapy Consult: No  Current Support Network: Other  Confirm Follow Up Transport: Family  Plan discussed with Pt/Family/Caregiver: Yes  Discharge Location  Discharge Placement: Home with family assistance

## 2017-08-31 NOTE — PROGRESS NOTES
08/31/17 1401   Vitals   Temp 97.8 °F (36.6 °C)   Temp Source Oral   Pulse (Heart Rate) 85   Heart Rate Source Monitor   Heart Rate (Monitor) 83   Resp Rate 18   O2 Sat (%) 100 %   Level of Consciousness (!) Confused or Agitated  (does not know he is in [facility]-not agitated or p)   /72   MAP (Calculated) 98   BP 1 Location Right arm   BP 1 Method Automatic   BP Patient Position Post activity   Cardiac Rhythm NSR   MEWS Score 3   Box Number 440   Electrodes Replaced No   Patient is still altered mental status-only change in vital signs to make a MEWs score of 3 is confusion.

## 2017-08-31 NOTE — PROGRESS NOTES
Bedside shift change report given to Shavon Membreno (oncoming nurse) by Nan Pedersen (offgoing nurse). Report included the following information SBAR, Kardex, ED Summary, Intake/Output, MAR and Recent Results.

## 2017-08-31 NOTE — PROGRESS NOTES
TRANSFER - OUT REPORT:    Verbal report given to Laisha(name) on Cesar Jimenez Sr.  being transferred to (unit) for routine progression of care       Report consisted of patients Situation, Background, Assessment and   Recommendations(SBAR). Information from the following report(s) SBAR, Intake/Output, MAR, Recent Results and Cardiac Rhythm NSR was reviewed with the receiving nurse. Lines:   Venous Access Device 08/02/17 Other (comment) (Active)   Central Line Being Utilized No 8/31/2017 11:51 AM   Dressing Type Bandaid 8/30/2017  4:00 PM   Action Taken Blood drawn 8/30/2017 11:00 AM   Date Accessed (Medial Site) 08/30/17 8/30/2017 11:00 AM   Access Time (Medial Site) 1129 8/30/2017 11:00 AM   Access Needle Size (Site #1) 20 G 8/30/2017 11:00 AM   Access Needle Length (Medial Site) 1 inch 8/30/2017 11:00 AM   Positive Blood Return (Medial Site) Yes 8/30/2017 11:00 AM   Action Taken (Medial Site) Flushed; De-accessed 8/30/2017  4:00 PM       Peripheral IV 08/31/17 Right Wrist (Active)   Site Assessment Clean, dry, & intact 8/31/2017 11:51 AM   Phlebitis Assessment 0 8/31/2017 11:51 AM   Infiltration Assessment 0 8/31/2017 11:51 AM   Dressing Status Clean, dry, & intact 8/31/2017 11:51 AM   Dressing Type Tape;Transparent 8/31/2017 11:51 AM   Hub Color/Line Status Pink; Infusing 8/31/2017 11:51 AM   Action Taken Open ports on tubing capped 8/31/2017 11:51 AM   Alcohol Cap Used Yes 8/31/2017 11:51 AM        Opportunity for questions and clarification was provided.       Patient transported with:   [employer]

## 2017-08-31 NOTE — DIABETES MGMT
DTC Consult Note    Recommendations/ Comments:  Consult noted for assessment of home management, however, patient does not appear to have diabetes based on A1C (5.3%). Also, patient had a blood sugar spike of 251 at 2240 last night, but dexamethasone was given at 1310 yesterday. Will need to follow blood sugars due to tube feedings being ordered. Chart reviewed on [facility]. .    Patient is a 80 y.o. male with no history of diabetes, elevated blood sugar secondary to steroids. A1c:   Lab Results   Component Value Date/Time    Hemoglobin A1c 5.3 08/30/2017 10:40 PM    Hemoglobin A1c 5.7 08/06/2015 01:28 PM       Recent Glucose Results:   Lab Results   Component Value Date/Time     (H) 08/31/2017 03:52 AM     (H) 08/30/2017 10:40 PM    GLUCPOC 108 (H) 08/31/2017 07:21 AM    GLUCPOC 138 (H) 08/31/2017 01:53 AM        Lab Results   Component Value Date/Time    Creatinine 1.33 08/31/2017 03:52 AM     Estimated Creatinine Clearance: 43.8 mL/min (based on Cr of 1.33). Active Orders   Diet    DIET MECHANICAL SOFT        PO intake: No data found. Current hospital DM medication: Humalog for correction, normal sensitivity - not requiring    Will continue to follow as needed.     Thank you  Alfonso Tran MS, RN, CDE

## 2017-08-31 NOTE — ROUTINE PROCESS
TRANSFER - OUT REPORT:    Verbal report given to SEVEN Ramachandran (name) on [facility].  being transferred to [facility] (unit) for routine progression of care       Report consisted of patients Situation, Background, Assessment and   Recommendations(SBAR). Information from the following report(s) SBAR, ED Summary, Intake/Output, MAR and Recent Results was reviewed with the receiving nurse. Lines:   Venous Access Device 08/02/17 Other (comment) (Active)   Central Line Being Utilized No 8/30/2017  4:00 PM   Dressing Type Bandaid 8/30/2017  4:00 PM   Action Taken Blood drawn 8/30/2017 11:00 AM   Date Accessed (Medial Site) 08/30/17 8/30/2017 11:00 AM   Access Time (Medial Site) 1129 8/30/2017 11:00 AM   Access Needle Size (Site #1) 20 G 8/30/2017 11:00 AM   Access Needle Length (Medial Site) 1 inch 8/30/2017 11:00 AM   Positive Blood Return (Medial Site) Yes 8/30/2017 11:00 AM   Action Taken (Medial Site) Flushed; De-accessed 8/30/2017  4:00 PM       Peripheral IV 08/30/17 Left Wrist (Active)   Site Assessment Clean, dry, & intact 8/30/2017 11:03 PM   Phlebitis Assessment 0 8/30/2017 11:03 PM   Infiltration Assessment 0 8/30/2017 11:03 PM   Dressing Status Clean, dry, & intact 8/30/2017 11:03 PM   Dressing Type Transparent 8/30/2017 11:03 PM   Hub Color/Line Status Pink;Flushed;Patent 8/30/2017 11:03 PM   Action Taken Blood drawn 8/30/2017 11:03 PM        Opportunity for questions and clarification was provided.       Patient transported with:   Registered Nurse

## 2017-08-31 NOTE — CONSULTS
Hematology/Oncology Consult    REASON FOR CONSULT: Metastatic Esophageal Cancer, AMS    HISTORY OF PRESENT ILLNESS: Mr. Ion Vaca is a 80 y.o. male who presented yesterday evening to the Emergency Room after daughter noted he was agitated and confused. He was seen in the infusion center yesterday for chemotherapy, cycle 2 Carboplatin and Paclitaxel. He has been struggling with nausea and vomiting at home and been taking zofran and ativan regularly. He struggled with delirium after his first cycle thought to be related to steroids. He received 12mg IV dexamethasone as a premedication to his therapy yesterday and outpatient dexamethasone had been decreased. He went to radiation after chemo yesterday and was brought to the ED not long after. CBC remained stable and CMP with hyponatremia around his baseline. His potassium was elevated to 5.7 but down to 4.2 today. Elevated creatinine was around baseline at 1.33. Head CT obtained yesterday with no acute intracranial abnormality. He is admitted for further management of AMS. Today, he is up in the chair. He is confused and does not recognize myself or Dr. Lezama Adjutant. No family present. Majority of HPI obtained from chart review and nursing as patient is altered and no family present. Oncologic History:  He has had a dull epigastric abdominal pain ×6-7 months with progressive worsening. Pian does not radiate. He thought this is due to PUD and made dietary changes, without improvement. Pain was aggravated by solid food and gradually progressed to intolerance for liquids. He also reported a 20 pound weight loss over the past several months with fatigue. He was seen by GI Dr. Lisbeth Sheldon who performed an EGD on 6/28/17. Findings were notable for a 3-4 cm friable mass distal to the GE junction and extending down the cardia. 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   CT chest abdomen and pelvis done on 7/6/17 did not show any evidence of metastatic disease. His PET scan on 7/25/17 showed no evidence of metastatic disease.  Unfortunately his tumor prevented passage of the EUS scope and thus it was not able to be completed. DIAGNOSIS: GE junction adenocarcinoma  TREATMENT: Carboplatin (AUC 2) and Paclitaxel (50mg/m2) in conjunction with XRT  Cycle 1 - 8/23/17  Cycle 2 - 8/31/17    Past Medical History:   Diagnosis Date    Acid reflux     Arthritis     right  left knees    Back pain, chronic 4/11/2014    Cancer ([address] 2017    esophegeal    Decreased hearing of both ears 4/5/2017    DNR (do not resuscitate) 4/5/2017    Gastroesophageal reflux disease without esophagitis 6/13/2017    GERD (gastroesophageal reflux disease)     Hip pain, left 4/11/2014    Hypercholesterolemia     Hypertension     Need for varicella vaccine 4/11/2014    Physical deconditioning 8/17/2017    Rash of entire body 9/15/2016    Restless leg syndrome        Past Surgical History:   Procedure Laterality Date    ABDOMEN SURGERY PROC UNLISTED  1980's    gall bladder    ABDOMEN SURGERY PROC UNLISTED  15 yrs ago    right hernia repair    HX HEENT      EYE SURGERY 40 YRS AGO.     HX ORTHOPAEDIC      fluid removed both knees    HX ORTHOPAEDIC  2010    bilateral knee replacements       No Known Allergies    Current Facility-Administered Medications   Medication Dose Route Frequency Provider Last Rate Last Dose    sodium chloride (NS) flush 5-10 mL  5-10 mL IntraVENous Q8H Migdalia Palencia MD        sodium chloride (NS) flush 5-10 mL  5-10 mL IntraVENous PRN Migdalia Palencia MD        aspirin delayed-release tablet 81 mg  81 mg Oral DAILY Merced Claudio MD   81 mg at 08/31/17 0931    finasteride (PROSCAR) tablet 5 mg  5 mg Oral DAILY Merced Claudio MD   5 mg at 08/31/17 0931    pantoprazole (PROTONIX) tablet 40 mg  40 mg Oral ACB Merced Claudio MD   40 mg at 08/31/17 0931    tamsulosin (FLOMAX) capsule 0.8 mg  0.8 mg Oral DAILY Juan REYES Raina Patton MD   0.8 mg at 08/31/17 0931    traMADol (ULTRAM) tablet 50 mg  50 mg Oral Q8H PRN Julio Santiago MD        sucralfate (CARAFATE) 100 mg/mL oral suspension 1 g  1 g Oral QID PRN Julio Santiago MD        ondansetron (ZOFRAN) injection 4 mg  4 mg IntraVENous Q6H PRN Julio Santiago MD        QUEtiapine (SEROquel) tablet 25 mg  25 mg Oral QHS Julio Santiago MD        haloperidol lactate (HALDOL) injection 2 mg  2 mg IntraMUSCular Q6H PRN Julio Santiago MD        glucose chewable tablet 16 g  4 Tab Oral PRN Curtis Santos MD        dextrose (D50W) injection syrg 12.5-25 g  12.5-25 g IntraVENous PRN Curtis Santos MD        glucagon (GLUCAGEN) injection 1 mg  1 mg IntraMUSCular PRN Curtis Santos MD        insulin lispro (HUMALOG) injection   SubCUTAneous AC&HS Curtis Santos MD   Stopped at 08/31/17 0154       Social History     Social History    Marital status:      Spouse name: N/A    Number of children: N/A    Years of education: N/A     Social History Main Topics    Smoking status: Former Smoker     Years: 0.00     Quit date: 4/28/1970    Smokeless tobacco: Never Used    Alcohol use Yes      Comment: OCCASSIONALLY    Drug use: No    Sexual activity: Not Currently     Other Topics Concern    None     Social History Narrative       Family History   Problem Relation Age of Onset    Heart Disease Sister        ROS  As per the HPI, otherwise a comprehensive ROS is negative. ECOG PS is 3. Unable to assess emotional well being as he is disoriented.     Physical Examination:   Visit Vitals    BP (P) 166/90    Pulse (P) 76    Temp 97.8 °F (36.6 °C)    Resp (P) 18    Wt 179 lb 8 oz (81.4 kg)    SpO2 (P) 98%    BMI 24.34 kg/m2     General appearance - alert, confused, erratic, no physical distress noted  Mental status - disoriented  Mouth - mucous membranes dry, thrush noted  Neck - supple, no significant adenopathy  Lymphatics - no palpable lymphadenopathy, no hepatosplenomegaly  Chest - clear to auscultation, no wheezes, rales or rhonchi, symmetric air entry  Heart - normal rate, regular rhythm, normal S1, S2, no murmurs, rubs, clicks or gallops  Abdomen - soft, nontender, nondistended, bowel sounds present  Neurological - confused  Extremities - peripheral pulses normal, no BLE edema  Skin - warm, dry    LABS  Lab Results   Component Value Date/Time    WBC 4.6 08/31/2017 03:52 AM    HGB 10.5 08/31/2017 03:52 AM    HCT 30.6 08/31/2017 03:52 AM    PLATELET 771 35/54/1491 03:52 AM    MCV 91.6 08/31/2017 03:52 AM    ABS. NEUTROPHILS 4.3 08/31/2017 03:52 AM     Lab Results   Component Value Date/Time    Sodium 131 08/31/2017 03:52 AM    Potassium 4.2 08/31/2017 09:26 AM    Chloride 99 08/31/2017 03:52 AM    CO2 24 08/31/2017 03:52 AM    Glucose 110 08/31/2017 03:52 AM    BUN 22 08/31/2017 03:52 AM    Creatinine 1.33 08/31/2017 03:52 AM    GFR est AA >60 08/31/2017 03:52 AM    GFR est non-AA 51 08/31/2017 03:52 AM    Calcium 9.0 08/31/2017 03:52 AM    BUN (POC) 25 08/02/2017 08:19 AM    Calcium, ionized (POC) 1.20 08/02/2017 08:19 AM     Lab Results   Component Value Date/Time    AST (SGOT) 37 08/30/2017 10:40 PM    Alk. phosphatase 120 08/30/2017 10:40 PM    Protein, total 6.6 08/30/2017 10:40 PM    Albumin 3.0 08/30/2017 10:40 PM    Globulin 3.6 08/30/2017 10:40 PM    A-G Ratio 0.8 08/30/2017 10:40 PM       PATHOLOGY   FINAL PATHOLOGIC DIAGNOSIS   1. Stomach, antrum and body, biopsy   Antral-type mucosa with intestinal metaplasia and mild chronic superficial gastritis   No H. pylori organisms identified by H&E   2. 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 COMPARISON: 5/20/2017.   CONTRAST: None.    TECHNIQUE: Unenhanced CT of the head was performed using 5 mm images. Brain and  bone windows were generated. Sagittal and coronal reformations were generated. CT dose reduction was achieved through use of a standardized protocol tailored  for this examination and automatic exposure control for dose modulation. CT dose  reduction was achieved through use of a standardized protocol tailored for this  examination and automatic exposure control for dose modulation. Adaptive  statistical iterative reconstruction (ASIR) was utilized for this examination. FINDINGS:  The ventricles and sulci are normal in size, shape and configuration and  midline. There is no significant white matter disease. There is no  intracranial hemorrhage. There is no extra-axial collection, mass, mass effect  or midline shift. The basilar cisterns are open. No acute infarct is  identified. The bone windows demonstrate no abnormalities. The visualized  portions of the paranasal sinuses and mastoid air cells are clear. Impression IMPRESSION: No acute intracranial abnormality and no change. ASSESSMENT  Mr. Berry Arteaga is a 80 y.o. male with GE junction cancer currently undergoing therapy with Carboplatin and Paclitaxel who is admitted with altered mental status. DISCUSSION/PLAN  1. GE junction cancer. Received cycle 2 weekly Carbo (AUC 2) and Paclitaxel (50mg/m2) in conjunction with radiation yesterday. Given his history of delirium, current AMS, and overall PS - will hold on any further chemotherapy. It is likely he will be best suited by radiation alone. Dr. Kain Wright will try to reach daughter today to discuss plans for his cancer care moving forward. Focus on acute issues/hospitalization at this time. 2. Altered mental status. Head CT negative. Related to steroids? Received 12mg IV prior to chemo yesterday. Seroquel scheduled. Haldol PRN available. Appreciate Hospitalist management.  D/C decadron as previously scheduled on days 2 and 3 of chemotherapy     3. Nausea. Secondary to both cancer and chemotherapy. He denies this currently. PRN zofran on board. Do not recommend any steroids.     4. Dysphagia. He has a PEG. RD has evaluated, appreciate input. Plan to resume tube feeds.     5. Generalized weakness. Has been working with home PT. Referral for home OT placed yesterday during infusion visit. Will place consult to case management. Family feels they are unable to care for him given weakness - may need skilled facility. Appreciate assistance.     6. Hyponatremia. Appreciate Hospitalist management. We will follow along with you. Please call with any questions. Seen in conjunction with Melany Gonzales NP. I do not believe he would tolerate further chemotherapy.  Will discuss with daughter    Signed by: Talita Jay MD                     August 31, 2017

## 2017-09-01 NOTE — TELEPHONE ENCOUNTER
Call to Sandie Oliveira, patient daughter. HIPAA verified. Reviewed recent events of hospitalization. Discussed with her that we feel chemotherapy is causing undue harm and he is not tolerating this well. It would be unsafe to continue chemotherapy. Reviewed that we would like for him to continue radiation alone and see how he tolerates this. Radiation would be palliative in nature with hopes to control dysphagia and provide him the ability to eat for pleasure. Reviewed again with her his cancer is life limiting and he will eventually succumb to this. All treatment goals are palliative and to provide improved quality of life. PT has evaluated and recommended skilled facility. Sandie Oliveira with concerns in caring for him and home. We reviewed skilled facility is the best place for him to be cared for. Answered all of Funmilayo's questions today. She is very thankful for call. Encouraged her to call our office with any questions.

## 2017-09-01 NOTE — PROGRESS NOTES
Primary Nurse Soraya Mejia, SEVEN and Laurie Flowers RN performed a dual skin assessment on this patient No impairment noted  Kendall score is 22

## 2017-09-01 NOTE — TELEPHONE ENCOUNTER
Pts sonBong needs to speak with someone about the family leave papers recently filled out. They need additional info.  Please call [phone]

## 2017-09-01 NOTE — PROGRESS NOTES
Bedside shift change report given to [address] (oncoming nurse) by Antwan Snyder (offgoing nurse). Report included the following information SBAR.

## 2017-09-01 NOTE — PROGRESS NOTES
Hospitalist Progress Note  Lita Ferrara MD  Office: [phone]  Cell: [phone]      Date of Service:  2017  NAME:  Pedro Isaac Sr.  :  1931  MRN:  [id number]      Admission Summary:   79 yo male with PMHx of Esophageal Ca with Mets, undergoing Chemo and RTx, HTN, HLD, RLS admitted from home for agitation and confusion. Pt has been taking compazine and ativan for cancer related nausea and vomiting. Pt had similar episode 2 weeks ago as well. Daughter takes care of patient home but stating that it has become burdensome for her due to his memory issues and confusions. 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 Vital Signs:    Last 24hrs VS reviewed since prior progress note.  Most recent are:  Visit Vitals    BP (!) 147/99 (BP 1 Location: Left arm, BP Patient Position: Sitting)    Pulse 91    Temp 97.8 °F (36.6 °C)    Resp 15    Wt 81.4 kg (179 lb 8 oz)    SpO2 98%    BMI 24.34 kg/m2         Intake/Output Summary (Last 24 hours) at 09/01/17 1519  Last data filed at 09/01/17 1245   Gross per 24 hour   Intake             1220 ml   Output                0 ml   Net             1220 ml        Physical Examination:             Constitutional:  No acute distress, cooperative, pleasant    ENT:  Oral mucous moist   Resp:  CTA bilaterally. CV:  Regular rhythm, normal rate    GI:  Soft, non distended, non tender. normoactive bowel sounds, PEG in place    Musculoskeletal:  No edema, warm, 2+ pulses throughout    Neurologic:  Moves all extremities. AAOx2     Psych:  paranoid, emotional, poor judgement and insight       Data Review:    Review and/or order of clinical lab test  Review and/or order of tests in the radiology section of CPT  Review and/or order of tests in the medicine section of CPT      Labs:     Recent Labs      08/31/17   0352  08/30/17   2240   WBC  4.6  3.9*   HGB  10.5*  10.2*   HCT  30.6*  29.1*   PLT  318  276     Recent Labs      09/01/17   0634  08/31/17   0926  08/31/17   0352  08/30/17   2240   NA  135*   --   131*  131*   K  4.4  4.2  5.7*  4.6   CL  104   --   99  97   CO2  23   --   24  21   BUN  19   --   22*  22*   CREA  1.17   --   1.33*  1.50*   GLU  100   --   110*  251*   CA  8.9   --   9.0  8.5   MG   --    --   2.1   --    PHOS   --    --   2.7   --      Recent Labs      08/30/17   2240   SGOT  37   ALT  53   AP  120*   TBILI  0.5   TP  6.6   ALB  3.0*   GLOB  3.6     Recent Labs      08/30/17   2245   INR  1.1      No results for input(s): FE, TIBC, PSAT, FERR in the last 72 hours. Lab Results   Component Value Date/Time    Folate 11.2 09/16/2016 11:03 AM      No results for input(s): PH, PCO2, PO2 in the last 72 hours.   Recent Labs      08/30/17   [address] <0.04     Lab Results   Component Value Date/Time    Cholesterol, total 197 04/05/2017 03:43 PM    HDL Cholesterol 40 04/05/2017 03:43 PM    LDL, calculated 154 09/16/2016 11:03 AM    Triglyceride 124 09/16/2016 11:03 AM    CHOL/HDL Ratio 4.5 10/27/2010 07:50 AM     Lab Results   Component Value Date/Time    Glucose (POC) 107 09/01/2017 11:05 AM    Glucose (POC) 132 09/01/2017 07:00 AM    Glucose (POC) 103 08/31/2017 09:42 PM    Glucose (POC) 130 08/31/2017 04:41 PM    Glucose (POC) 113 08/31/2017 12:32 PM     Lab Results   Component Value Date/Time    Color YELLOW/STRAW 08/31/2017 12:31 PM    Appearance CLEAR 08/31/2017 12:31 PM    Specific gravity 1.010 08/31/2017 12:31 PM    pH (UA) 6.5 08/31/2017 12:31 PM    Protein NEGATIVE  08/31/2017 12:31 PM    Glucose NEGATIVE  08/31/2017 12:31 PM    Ketone NEGATIVE  08/31/2017 12:31 PM    Bilirubin NEGATIVE  08/31/2017 12:31 PM    Urobilinogen 1.0 08/31/2017 12:31 PM    Nitrites NEGATIVE  08/31/2017 12:31 PM    Leukocyte Esterase NEGATIVE  08/31/2017 12:31 PM    Epithelial cells FEW 08/31/2017 12:31 PM    Bacteria NEGATIVE  08/31/2017 12:31 PM    WBC 0-4 08/31/2017 12:31 PM    RBC  08/31/2017 12:31 PM         Medications Reviewed:     Current Facility-Administered Medications   Medication Dose Route Frequency    sodium chloride (NS) flush 5-10 mL  5-10 mL IntraVENous Q8H    sodium chloride (NS) flush 5-10 mL  5-10 mL IntraVENous PRN    aspirin delayed-release tablet 81 mg  81 mg Oral DAILY    finasteride (PROSCAR) tablet 5 mg  5 mg Oral DAILY    pantoprazole (PROTONIX) tablet 40 mg  40 mg Oral ACB    tamsulosin (FLOMAX) capsule 0.8 mg  0.8 mg Oral DAILY    traMADol (ULTRAM) tablet 50 mg  50 mg Oral Q8H PRN    sucralfate (CARAFATE) 100 mg/mL oral suspension 1 g  1 g Oral QID PRN    ondansetron (ZOFRAN) injection 4 mg  4 mg IntraVENous Q6H PRN    QUEtiapine (SEROquel) tablet 25 mg  25 mg Oral QHS    haloperidol lactate (HALDOL) injection 2 mg  2 mg IntraMUSCular Q6H PRN    glucose chewable tablet 16 g  4 Tab Oral PRN    dextrose (D50W) injection syrg 12.5-25 g  12.5-25 g IntraVENous PRN    glucagon (GLUCAGEN) injection 1 mg  1 mg IntraMUSCular PRN    insulin lispro (HUMALOG) injection   SubCUTAneous AC&HS     ______________________________________________________________________  EXPECTED LENGTH OF STAY: 2d 9h  ACTUAL LENGTH OF STAY:          1                 Carson Manzano MD

## 2017-09-01 NOTE — PROGRESS NOTES
Bedside and Verbal shift change report given to VERÓNICA Balnk (oncoming nurse) by Florentino Otto (offgoing nurse). Report included the following information SBAR.

## 2017-09-01 NOTE — PROGRESS NOTES
TRANSFER - IN REPORT:    Verbal report received from Lauren(name) on Mesitis Sr.  being received from [facility](unit) for routine progression of care      Report consisted of patients Situation, Background, Assessment and   Recommendations(SBAR). Information from the following report(s) SBAR was reviewed with the receiving nurse. Opportunity for questions and clarification was provided. Assessment completed upon patients arrival to unit and care assumed.

## 2017-09-01 NOTE — PROGRESS NOTES
Radiation treatment 7 of 25 currently planned radiation treatments delivered to the [facility]

## 2017-09-01 NOTE — PROGRESS NOTES
Hematology/Oncology Consult    REASON FOR CONSULT: Metastatic Esophageal Cancer, AMS    HISTORY OF PRESENT ILLNESS: Mr. Jeanette Espinoza is a 80 y.o. male who presented yesterday evening to the Emergency Room after daughter noted he was agitated and confused. He was seen in the infusion center yesterday for chemotherapy, cycle 2 Carboplatin and Paclitaxel. He has been struggling with nausea and vomiting at home and been taking zofran and ativan regularly. He struggled with delirium after his first cycle thought to be related to steroids. He received 12mg IV dexamethasone as a premedication to his therapy yesterday and outpatient dexamethasone had been decreased. He went to radiation after chemo yesterday and was brought to the ED not long after. CBC remained stable and CMP with hyponatremia around his baseline. His potassium was elevated to 5.7 but down to 4.2 today. Elevated creatinine was around baseline at 1.33. Head CT obtained yesterday with no acute intracranial abnormality. He is admitted for further management of AMS. Today, he is up in the chair. He is confused and does not recognize myself or Dr. Tomas Nava. No family present. Majority of HPI obtained from chart review and nursing as patient is altered and no family present. INTERVAL HISTORY: He remains somewhat confused today. He is calmer today during my visit. Tube feeds are going well. Discussed care with daughter, Bhavin Taylor, today. He remains weak. Case management looking into skilled facilities. No pain.     Past Medical History:   Diagnosis Date    Acid reflux     Arthritis     right  left knees    Back pain, chronic 4/11/2014    Cancer ([facility] [address] 2017    esophegeal    Decreased hearing of both ears 4/5/2017    DNR (do not resuscitate) 4/5/2017    Gastroesophageal reflux disease without esophagitis 6/13/2017    GERD (gastroesophageal reflux disease)     Hip pain, left 4/11/2014    Hypercholesterolemia     Hypertension     Need for varicella vaccine 4/11/2014    Physical deconditioning 8/17/2017    Rash of entire body 9/15/2016    Restless leg syndrome        Past Surgical History:   Procedure Laterality Date    ABDOMEN SURGERY PROC UNLISTED  1980's    gall bladder    ABDOMEN SURGERY PROC UNLISTED  15 yrs ago    right hernia repair    HX HEENT      EYE SURGERY 40 YRS AGO.     HX ORTHOPAEDIC      fluid removed both knees    HX ORTHOPAEDIC  2010    bilateral knee replacements       No Known Allergies    Current Facility-Administered Medications   Medication Dose Route Frequency Provider Last Rate Last Dose    sodium chloride (NS) flush 5-10 mL  5-10 mL IntraVENous Q8H Toya Ureña MD   10 mL at 09/01/17 1538    sodium chloride (NS) flush 5-10 mL  5-10 mL IntraVENous PRN Toya Ureña MD        aspirin delayed-release tablet 81 mg  81 mg Oral DAILY Gene Figueroa MD   81 mg at 09/01/17 0951    finasteride (PROSCAR) tablet 5 mg  5 mg Oral DAILY Gene Figueroa MD   5 mg at 09/01/17 0952    pantoprazole (PROTONIX) tablet 40 mg  40 mg Oral ACB Gene Figueroa MD   40 mg at 09/01/17 0713    tamsulosin (FLOMAX) capsule 0.8 mg  0.8 mg Oral DAILY Gene Figueroa MD   0.8 mg at 09/01/17 0951    traMADol (ULTRAM) tablet 50 mg  50 mg Oral Q8H PRN Gene Figueroa MD        sucralfate (CARAFATE) 100 mg/mL oral suspension 1 g  1 g Oral QID PRN Gene Figueroa MD        ondansetron (ZOFRAN) injection 4 mg  4 mg IntraVENous Q6H PRN Gene Figueroa MD   4 mg at 09/01/17 1538    QUEtiapine (SEROquel) tablet 25 mg  25 mg Oral QHS Gene Figueroa MD   25 mg at 08/31/17 2207    haloperidol lactate (HALDOL) injection 2 mg  2 mg IntraMUSCular Q6H PRN Gene Figueroa MD        glucose chewable tablet 16 g  4 Tab Oral PRN Toya Ureña MD        dextrose (D50W) injection syrg 12.5-25 g  12.5-25 g IntraVENous PRN Toya Ureña MD        glucagon (GLUCAGEN) injection 1 mg  1 mg IntraMUSCular PRN Ekaterina More MD Anali       [address] [place] [address] insulin lispro (HUMALOG) injection   SubCUTAneous AC&HS Silvino Cole MD   Stopped at 08/31/17 0154       Social History     Social History    Marital status:      Spouse name: N/A    Number of children: N/A    Years of education: N/A     Social History Main Topics    Smoking status: Former Smoker     Years: 0.00     Quit date: 4/28/1970    Smokeless tobacco: Never Used    Alcohol use Yes      Comment: OCCASSIONALLY    Drug use: No    Sexual activity: Not Currently     Other Topics Concern    None     Social History Narrative       Family History   Problem Relation Age of Onset    Heart Disease Sister        ROS  As per the HPI, otherwise a comprehensive ROS is negative. ECOG PS is 3. Unable to assess emotional well being as he is disoriented. Physical Examination:   Visit Vitals    BP (!) 147/99 (BP 1 Location: Left arm, BP Patient Position: Sitting)    Pulse 91    Temp 97.8 °F (36.6 °C)    Resp 15    Wt 179 lb 8 oz (81.4 kg)    SpO2 98%    BMI 24.34 kg/m2     General appearance - alert, confused, erratic, no physical distress noted  Mental status - disoriented  Mouth - mucous membranes dry, thrush noted  Neck - supple, no significant adenopathy  Lymphatics - no palpable lymphadenopathy, no hepatosplenomegaly  Chest - clear to auscultation, no wheezes, rales or rhonchi, symmetric air entry  Heart - normal rate, regular rhythm, normal S1, S2, no murmurs, rubs, clicks or gallops  Abdomen - soft, nontender, nondistended, bowel sounds present  Neurological - confused  Extremities - peripheral pulses normal, no BLE edema  Skin - warm, dry    LABS  Lab Results   Component Value Date/Time    WBC 4.6 08/31/2017 03:52 AM    HGB 10.5 08/31/2017 03:52 AM    HCT 30.6 08/31/2017 03:52 AM    PLATELET 778 05/83/1376 03:52 AM    MCV 91.6 08/31/2017 03:52 AM    ABS.  NEUTROPHILS 4.3 08/31/2017 03:52 AM     Lab Results   Component Value Date/Time    Sodium 135 09/01/2017 06:34 AM Potassium 4.4 09/01/2017 06:34 AM    Chloride 104 09/01/2017 06:34 AM    CO2 23 09/01/2017 06:34 AM    Glucose 100 09/01/2017 06:34 AM    BUN 19 09/01/2017 06:34 AM    Creatinine 1.17 09/01/2017 06:34 AM    GFR est AA >60 09/01/2017 06:34 AM    GFR est non-AA 59 09/01/2017 06:34 AM    Calcium 8.9 09/01/2017 06:34 AM    BUN (POC) 25 08/02/2017 08:19 AM    Calcium, ionized (POC) 1.20 08/02/2017 08:19 AM     Lab Results   Component Value Date/Time    AST (SGOT) 37 08/30/2017 10:40 PM    Alk. phosphatase 120 08/30/2017 10:40 PM    Protein, total 6.6 08/30/2017 10:40 PM    Albumin 3.0 08/30/2017 10:40 PM    Globulin 3.6 08/30/2017 10:40 PM    A-G Ratio 0.8 08/30/2017 10:40 PM       PATHOLOGY   FINAL PATHOLOGIC DIAGNOSIS   1. Stomach, antrum and body, biopsy   Antral-type mucosa with intestinal metaplasia and mild chronic superficial gastritis   No H. pylori organisms identified by H&E   2. 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 COMPARISON: 5/20/2017. CONTRAST: None. TECHNIQUE: Unenhanced CT of the head was performed using 5 mm images. Brain and  bone windows were generated. Sagittal and coronal reformations were generated. CT dose reduction was achieved through use of a standardized protocol tailored  for this examination and automatic exposure control for dose modulation. CT dose  reduction was achieved through use of a standardized protocol tailored for this  examination and automatic exposure control for dose modulation. Adaptive  statistical iterative reconstruction (ASIR) was utilized for this examination. FINDINGS:  The ventricles and sulci are normal in size, shape and configuration and  midline.   There is no significant white matter disease. There is no  intracranial hemorrhage. There is no extra-axial collection, mass, mass effect  or midline shift. The basilar cisterns are open. No acute infarct is  identified. The bone windows demonstrate no abnormalities. The visualized  portions of the paranasal sinuses and mastoid air cells are clear. Impression IMPRESSION: No acute intracranial abnormality and no change. ASSESSMENT  Mr. Jose Funes is a 80 y.o. male with GE junction cancer currently undergoing therapy with Carboplatin and Paclitaxel who is admitted with altered mental status. DISCUSSION/PLAN  1. GE junction cancer. Received cycle 2 weekly Carbo (AUC 2) and Paclitaxel (50mg/m2) in conjunction with radiation 8/30. Given his history of delirium, current AMS, and overall PS - will hold on any further chemotherapy. It is likely he will be best suited by radiation alone. Long discussion today with his daughter, Girish Robison, via phone. Reviewed plan to hold any further chemotherapy. She and patient son are in agreement. They would like to try radiation alone. Discussed goals are palliative in nature and this cancer will be life limiting. Focus on supportive care while he is here. 2. Altered mental status. Head CT negative. Related to steroids? Received 12mg IV prior to chemo yesterday. Seroquel scheduled. Haldol PRN available. Appreciate Hospitalist management. D/C decadron as previously scheduled on days 2 and 3 of chemotherapy     3. Nausea. Secondary to both cancer and chemotherapy. He denies this currently. PRN zofran on board. Do not recommend any steroids.     4. Dysphagia. He has a PEG. RD has evaluated, appreciate input. Tube feeds restarted without difficulty.     5. Generalized weakness. PT has evaluated. He will need to go to a skilled facility. Appreciate case management.     6. Hyponatremia. Improving. Appreciate Hospitalist management. Long discussion with Girish Robison via phone today. See phone encounter. No further chemotherapy. We will follow along with you. Please call with any questions. Seen in conjunction with Sky Joe NP.   Discussed with Dr. Gerda Chiu with Rad Onc and Dr. Lizabeth Durant by: Alexey Sands MD                     September 1, 2017

## 2017-09-01 NOTE — ROUTINE PROCESS
TRANSFER - OUT REPORT:    Verbal report given to Yuri Rojas on Boxbe Sr.  being transferred to [address] [place] [address] for routine progression of care       Report consisted of patients Situation, Background, Assessment and   Recommendations(SBAR). Information from the following report(s) SBAR was reviewed with the receiving SW. Opportunity for questions and clarification was provided.       Patient transported with:   [facility]

## 2017-09-01 NOTE — PROGRESS NOTES
CM received SBAR earlier today. CM met with patient's daughter to discuss transitions of care. Referral had been sent to [facility]. Liaison from facility Aguila Blair) met with patient and daughter. Discussion regarding long term care took place and daughter initially stated that patient's home would not be sold to assist him with medicaid eligibility. Patient's granddaughter is living in the patient's home at this time. CM met with the daughter and spoke with the son x2 by phone. The son is POA. CM explained to both that patient would need either financial resources to pay for long term care $6,000 to $8,000 per month or apply for medicaid benefits if he does not have longterm care insurance. Options were explained to them as follows:    Option 1. Admission to snf and transition to long term care. Paolo Romero Apply for [employer] and place home on market for sale. Patient will need insurance authorization which may not occur until Tuesday. Option 2. Patient to snf and d/c to home of son with personal care services. Apply for medicaid. Will need to sell home  Option 3. Patient to snf and d/c to patient's home with a live-in caregiver and personal care services. Patient will not need to sell home as it will not be a resource. Patient's son returned call to this CM later today. He desires to move forward with referral to snf and continue to discuss options 2 and 3 with family. He states he is willing to do whatever is necessary to assist in the care of his father. He was given the cell phone number for Aguila Blair (liaison with [email or web address]--[phone]). With this being a holiday weekend, insurance auth may not be obtained until Tuesday. CM will continue to follow.   ROGERIO LazoW, CRM

## 2017-09-01 NOTE — PROGRESS NOTES
NUTRITION       Recommendations:  1. If pt continues to complain of post-prandial nausea, consider using prn zofran and/or carafate prior to feeding    2. Continue tube feeding at goal as ordered as primary source of nutrition    Brief follow up. Chart reviewed, discussed with RN. Pt in good spirits this morning, wants to get up and walk. Getting his socks and cane organized in the room. Spitting up phlegm. Can't remember if he ate breakfast-- thinks he may have vomited, but no episodes of emesis today per RN. Pt should continue on goal tube feedings. Per report, pt tolerated 0600 feeding this morning without issues (pt doesn't recall and no feed documented). He is getting his second feeding of the day now. Goal Tube Feeding: Two Rubens HN, 240 mL 5x/day + 100 mL flush before and after each feeding  -- This provides 2375 kcal, 100 g protein and 2030 mL total free water (tf + flush)-- meeting 97% and 100% of estimated needs, respectively. Estimated Nutrition Needs:   Kcals/day: 5932 Kcals/day (6174-1782 kcal/day (30-32 kcal/kg))  Protein: 98 g (1.2 g/kg (increase prn if renal function allows))  Fluid: 2442 ml (1 mL/kcal)     Based On: Kcal/kg - specify (Comment)  Weight Used: Actual wt (81.4 kg)     RD to follow.     [id number]

## 2017-09-02 NOTE — PROGRESS NOTES
Notified Dr. Marian Cardozo that pt c/o nausea, received zofran around [phone]. No new orders received at this time.

## 2017-09-02 NOTE — PROGRESS NOTES
Hospitalist Progress Note  Christi Mcdowell MD  Office: [phone]  Cell: [phone]      Date of Service:  2017  NAME:  Salinas Coleman Sr.  :  1931  MRN:  [id number]      Admission Summary:   81 yo male with PMHx of Esophageal Ca with Mets, undergoing Chemo and RTx, HTN, HLD, RLS admitted from home for agitation and confusion. Pt has been taking compazine and ativan for cancer related nausea and vomiting. Pt had similar episode 2 weeks ago as well. Daughter takes care of patient home but stating that it has become burdensome for her due to his memory issues and confusions. 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, with behavioral disturbances  - reorient  - increased Seroquel to 50mg HS  - prn Haldol    BPH   - c/w Proscar and Flomax    Dysphagia due to Esophageal Ca  - mechanical soft diet per speech eval   - Tube feeding    Code status: DNR  DVT prophylaxis: SCDs    Care Plan discussed with: Patient/Family and Nurse  Disposition: [address] [place] [address] Problems  Date Reviewed: 2017          Codes Class Noted POA    * (Principal)Altered mental status ICD-10-CM: R41.82  ICD-9-CM: 780.97  2017 Unknown                Review of Systems:   A comprehensive review of systems was negative except for that written in the HPI. Vital Signs:    Last 24hrs VS reviewed since prior progress note.  Most recent are:  Visit Vitals    BP 147/83 (BP 1 Location: Left arm, BP Patient Position: At rest)    Pulse 84    Temp 97.2 °F (36.2 °C)    Resp 15    Wt 81.4 kg (179 lb 8 oz)    SpO2 96%    BMI 24.34 kg/m2         Intake/Output Summary (Last 24 hours) at 09/02/17 1022  Last data filed at 09/02/17 0842   Gross per 24 hour   Intake              880 ml   Output                0 ml   Net              880 ml        Physical Examination:             Constitutional:  No acute distress, cooperative, pleasant    ENT:  Oral mucous moist   Resp:  CTA bilaterally. CV:  Regular rhythm, normal rate    GI:  Soft, non distended, non tender. normoactive bowel sounds, PEG in place    Musculoskeletal:  No edema, warm, 2+ pulses throughout    Neurologic:  Moves all extremities. AAOx2     Psych:  paranoid, emotional, poor judgement and insight       Data Review:    Review and/or order of clinical lab test  Review and/or order of tests in the radiology section of CPT  Review and/or order of tests in the medicine section of CPT      Labs:     Recent Labs      08/31/17   0352  08/30/17   2240   WBC  4.6  3.9*   HGB  10.5*  10.2*   HCT  30.6*  29.1*   PLT  318  276     Recent Labs      09/01/17   0634  08/31/17   0926  08/31/17   0352  08/30/17   2240   NA  135*   --   131*  131*   K  4.4  4.2  5.7*  4.6   CL  104   --   99  97   CO2  23   --   24  21   BUN  19   --   22*  22*   CREA  1.17   --   1.33*  1.50*   GLU  100   --   110*  251*   CA  8.9   --   9.0  8.5   MG   --    --   2.1   --    PHOS   --    --   2.7   --      Recent Labs      08/30/17   2240   SGOT  37   ALT  53   AP  120*   TBILI  0.5   TP  6.6   ALB  3.0*   GLOB  3.6     Recent Labs      08/30/17   2245   INR  1.1      No results for input(s): FE, TIBC, PSAT, FERR in the last 72 hours. Lab Results   Component Value Date/Time    Folate 11.2 09/16/2016 11:03 AM      No results for input(s): PH, PCO2, PO2 in the last 72 hours.   Recent Labs      08/30/17   2240   TROIQ  <0.04     Lab Results Component Value Date/Time    Cholesterol, total 197 04/05/2017 03:43 PM    HDL Cholesterol 40 04/05/2017 03:43 PM    LDL, calculated 154 09/16/2016 11:03 AM    Triglyceride 124 09/16/2016 11:03 AM    CHOL/HDL Ratio 4.5 10/27/2010 07:50 AM     Lab Results   Component Value Date/Time    Glucose (POC) 98 09/02/2017 06:32 AM    Glucose (POC) 102 09/01/2017 09:17 PM    Glucose (POC) 145 09/01/2017 04:31 PM    Glucose (POC) 107 09/01/2017 11:05 AM    Glucose (POC) 132 09/01/2017 07:00 AM     Lab Results   Component Value Date/Time    Color YELLOW/STRAW 08/31/2017 12:31 PM    Appearance CLEAR 08/31/2017 12:31 PM    Specific gravity 1.010 08/31/2017 12:31 PM    pH (UA) 6.5 08/31/2017 12:31 PM    Protein NEGATIVE  08/31/2017 12:31 PM    Glucose NEGATIVE  08/31/2017 12:31 PM    Ketone NEGATIVE  08/31/2017 12:31 PM    Bilirubin NEGATIVE  08/31/2017 12:31 PM    Urobilinogen 1.0 08/31/2017 12:31 PM    Nitrites NEGATIVE  08/31/2017 12:31 PM    Leukocyte Esterase NEGATIVE  08/31/2017 12:31 PM    Epithelial cells FEW 08/31/2017 12:31 PM    Bacteria NEGATIVE  08/31/2017 12:31 PM    WBC 0-4 08/31/2017 12:31 PM    RBC  08/31/2017 12:31 PM         Medications Reviewed:     Current Facility-Administered Medications   Medication Dose Route Frequency    QUEtiapine (SEROquel) tablet 50 mg  50 mg Oral QHS    sodium chloride (NS) flush 5-10 mL  5-10 mL IntraVENous Q8H    sodium chloride (NS) flush 5-10 mL  5-10 mL IntraVENous PRN    aspirin delayed-release tablet 81 mg  81 mg Oral DAILY    finasteride (PROSCAR) tablet 5 mg  5 mg Oral DAILY    pantoprazole (PROTONIX) tablet 40 mg  40 mg Oral ACB    tamsulosin (FLOMAX) capsule 0.8 mg  0.8 mg Oral DAILY    traMADol (ULTRAM) tablet 50 mg  50 mg Oral Q8H PRN    sucralfate (CARAFATE) 100 mg/mL oral suspension 1 g  1 g Oral QID PRN    ondansetron (ZOFRAN) injection 4 mg  4 mg IntraVENous Q6H PRN    haloperidol lactate (HALDOL) injection 2 mg  2 mg IntraMUSCular Q6H PRN    glucose chewable tablet 16 g  4 Tab Oral PRN    dextrose (D50W) injection syrg 12.5-25 g  12.5-25 g IntraVENous PRN    glucagon (GLUCAGEN) injection 1 mg  1 mg IntraMUSCular PRN    insulin lispro (HUMALOG) injection   SubCUTAneous AC&HS     ______________________________________________________________________  EXPECTED LENGTH OF STAY: 2d 9h  ACTUAL LENGTH OF STAY:          2                 Odalis Stephens MD

## 2017-09-02 NOTE — PROGRESS NOTES
Bedside and Verbal shift change report given to Edelmira Cerna RN (oncoming nurse) by Mima Terrazas RN (offgoing nurse). Report included the following information SBAR, Kardex, MAR and Recent Results.

## 2017-09-03 NOTE — PROGRESS NOTES
Chart reviewed after noting hospice order placed last evening. Chart reviewed and noted that MD and family requesting evaluation for inpt hospice care for symptom management. CM sent referral via CC link to [address]. CM also called hospice [phone] and requested hospice on call staff to call this CM today, America Fraser is hospice nurse on call. Expecting call back. Will update bedside nurse. TAM Sin    9am  Received call back from America Fraser. Updated of hospice evaluation placed and ongoing concern for symptom management for pt. Karen Perez is hospice nurse in hospital today and she will evaluate pt today. JASMIN spoke with bedside nurse Pepe Whitmore, regarding hospice evaluation today.     Dorothy Herr MSW

## 2017-09-03 NOTE — PROGRESS NOTES
Hematology/Oncology Consult    REASON FOR CONSULT:  GE junction Cancer, AMS    HISTORY OF PRESENT ILLNESS: Mr. Ama Hartman is a 80 y.o. male admitted with confusion and weakness after receiving cycle 2 of carboplatin and Taxol with XRT for known GE junction carcinoma      INTERVAL HISTORY: He remains somewhat confused today. Has intermittent periods of lucidity though when he demonstrates clear insight. He has struggled with nausea not relieved by Zofran. He has not been tolerating TFs. Weak, depressed, no fevers, no CP, SOB or HA. Past Medical History:   Diagnosis Date    Acid reflux     Arthritis     right  left knees    Back pain, chronic 4/11/2014    Cancer ([facility] [address] 2017    esophegeal    Decreased hearing of both ears 4/5/2017    DNR (do not resuscitate) 4/5/2017    Gastroesophageal reflux disease without esophagitis 6/13/2017    GERD (gastroesophageal reflux disease)     Hip pain, left 4/11/2014    Hypercholesterolemia     Hypertension     Need for varicella vaccine 4/11/2014    Physical deconditioning 8/17/2017    Rash of entire body 9/15/2016    Restless leg syndrome        Past Surgical History:   Procedure Laterality Date    ABDOMEN SURGERY PROC UNLISTED  1980's    gall bladder    ABDOMEN SURGERY PROC UNLISTED  15 yrs ago    right hernia repair    HX HEENT      EYE SURGERY 40 YRS AGO.     HX ORTHOPAEDIC      fluid removed both knees    HX ORTHOPAEDIC  2010    bilateral knee replacements       No Known Allergies    Current Facility-Administered Medications   Medication Dose Route Frequency Provider Last Rate Last Dose    promethazine (PHENERGAN) tablet 25 mg  25 mg Oral Q6H PRN Telma Nichols MD        QUEtiapine (SEROquel) tablet 50 mg  50 mg Oral QHS Keena Camacho MD   50 mg at 09/02/17 2147    sodium chloride (NS) flush 5-10 mL  5-10 mL IntraVENous Q8H Cy Duran MD   10 mL at 09/03/17 1158    sodium chloride (NS) flush 5-10 mL  5-10 mL IntraVENous PRN Cy Duran MD        aspirin delayed-release tablet 81 mg  81 mg Oral DAILY Amina Hiu MD   81 mg at 09/03/17 0901    finasteride (PROSCAR) tablet 5 mg  5 mg Oral DAILY Amina Hui MD   5 mg at 09/03/17 0905    pantoprazole (PROTONIX) tablet 40 mg  40 mg Oral ACB Amina Hui MD   40 mg at 09/03/17 0655    tamsulosin (FLOMAX) capsule 0.8 mg  0.8 mg Oral DAILY Amina Hui MD   0.8 mg at 09/03/17 0901    traMADol (ULTRAM) tablet 50 mg  50 mg Oral Q8H PRN Amina Hui MD   50 mg at 09/02/17 0339    sucralfate (CARAFATE) 100 mg/mL oral suspension 1 g  1 g Oral QID PRN Amina Hui MD        [facility]ansetron [facility] injection 4 mg  4 mg IntraVENous Q6H PRN Amina Hui MD   4 mg at 09/03/17 0901    haloperidol lactate (HALDOL) injection 2 mg  2 mg IntraMUSCular Q6H PRN Amina Hui MD        glucose chewable tablet 16 g  4 Tab Oral PRN Gladys Mendiola MD        dextrose (D50W) injection syrg 12.5-25 g  12.5-25 g IntraVENous PRN Gladys Mendiola MD        glucagon (GLUCAGEN) injection 1 mg  1 mg IntraMUSCular PRN Gladys Mendiola MD        insulin lispro (HUMALOG) injection   SubCUTAneous AC&HS Gladys Mendiola MD   Stopped at 09/01/17 2200       Social History     Social History    Marital status:      Spouse name: N/A    Number of children: N/A    Years of education: N/A     Social History Main Topics    Smoking status: Former Smoker     Years: 0.00     Quit date: 4/28/1970    Smokeless tobacco: Never Used    Alcohol use Yes      Comment: OCCASSIONALLY    Drug use: No    Sexual activity: Not Currently     Other Topics Concern    None     Social History Narrative       Family History   Problem Relation Age of Onset    Heart Disease Sister        ROS  As per the HPI, otherwise a comprehensive ROS is negative. ECOG PS is 3. Unable to assess emotional well being as he is disoriented.     Physical Examination:   Visit Vitals    /76 (BP 1 Location: Left arm, BP Patient Position: At rest)    Pulse 88    Temp 98.2 °F (36.8 °C)    Resp 16    Wt 179 lb 8 oz (81.4 kg)    SpO2 96%    BMI 24.34 kg/m2     General appearance - weak, pleasantly confused  Mouth - mucous membranes dry  Neck - supple, no significant adenopathy  Chest - clear to auscultation, no wheezes  Heart - normal rate, regular rhythm, normal S1, S2, no murmurs, rubs, clicks or gallops  Abdomen - soft, nontender, nondistended  Extremities - peripheral pulses normal, no BLE edema  Skin - warm, dry    LABS  Lab Results   Component Value Date/Time    WBC 4.6 08/31/2017 03:52 AM    HGB 10.5 08/31/2017 03:52 AM    HCT 30.6 08/31/2017 03:52 AM    PLATELET 240 50/52/8187 03:52 AM    MCV 91.6 08/31/2017 03:52 AM    ABS. NEUTROPHILS 4.3 08/31/2017 03:52 AM     Lab Results   Component Value Date/Time    Sodium 135 09/01/2017 06:34 AM    Potassium 4.4 09/01/2017 06:34 AM    Chloride 104 09/01/2017 06:34 AM    CO2 23 09/01/2017 06:34 AM    Glucose 100 09/01/2017 06:34 AM    BUN 19 09/01/2017 06:34 AM    Creatinine 1.17 09/01/2017 06:34 AM    GFR est AA >60 09/01/2017 06:34 AM    GFR est non-AA 59 09/01/2017 06:34 AM    Calcium 8.9 09/01/2017 06:34 AM    BUN (POC) 25 08/02/2017 08:19 AM    Calcium, ionized (POC) 1.20 08/02/2017 08:19 AM     Lab Results   Component Value Date/Time    AST (SGOT) 37 08/30/2017 10:40 PM    Alk. phosphatase 120 08/30/2017 10:40 PM    Protein, total 6.6 08/30/2017 10:40 PM    Albumin 3.0 08/30/2017 10:40 PM    Globulin 3.6 08/30/2017 10:40 PM    A-G Ratio 0.8 08/30/2017 10:40 PM       PATHOLOGY   FINAL PATHOLOGIC DIAGNOSIS   1.  Stomach, antrum and body, biopsy   Antral-type mucosa with intestinal metaplasia and mild chronic superficial gastritis   No H. pylori organisms identified by H&E   2. 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 COMPARISON: 5/20/2017. CONTRAST: None. TECHNIQUE: Unenhanced CT of the head was performed using 5 mm images. Brain and  bone windows were generated. Sagittal and coronal reformations were generated. CT dose reduction was achieved through use of a standardized protocol tailored  for this examination and automatic exposure control for dose modulation. CT dose  reduction was achieved through use of a standardized protocol tailored for this  examination and automatic exposure control for dose modulation. Adaptive  statistical iterative reconstruction (ASIR) was utilized for this examination. FINDINGS:  The ventricles and sulci are normal in size, shape and configuration and  midline. There is no significant white matter disease. There is no  intracranial hemorrhage. There is no extra-axial collection, mass, mass effect  or midline shift. The basilar cisterns are open. No acute infarct is  identified. The bone windows demonstrate no abnormalities. The visualized  portions of the paranasal sinuses and mastoid air cells are clear. Impression IMPRESSION: No acute intracranial abnormality and no change. ASSESSMENT  Mr. Leena Manzano is a 80 y.o. male with GE junction cancer currently undergoing therapy with Carboplatin and Paclitaxel who is admitted with altered mental status. DISCUSSION/PLAN  1. 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 Given his history of delirium, current AMS, and overall PS - will hold on any further chemotherapy. 2. Altered mental status. Head CT negative. Related to steroids     3. Nausea. Secondary to both cancer and chemotherapy.  Uncontrolled   In talking to the nurse it appears that he developed agitation when given Compazine. He has clear psychosis after Decadron and ativan.     Zyprexa, reglan may also make confusion worse. That unfortunately leaves us with Zofran only which he is unfortunately not obtaining much relief with. I have added phenergan 25 mg Q 6 hours prn  Today. 4. Dysphagia. He has a PEG. Intolerant to tube feeds due to nausea- hold for now and start IVF     5. Goals of care  Multiple discussions with the family, they had previously hoped for him to get palliative XRT alone. My last conversation with the children is as below     Chaim Cottrell, Bandar's son and POA spoke with me. They are sad to see him suffer. He acknowledged our limitations in managing his nausea due to medication intolerance. He stated that father is lucid now and they have all together made a decision to proceed with Hospice. They no longer want radiation. I confirmed this with Mr. Vikas Asher during his lucid period. They would like for him to transition to Hospice. I have discussed this with Hospice today. They are fatigued and do not believe they would be able to take care of him at home.  Hospice house is an option             Signed by: Bon Keller MD                     September 3, 2017

## 2017-09-03 NOTE — PROGRESS NOTES
Hospitalist Progress Note  Yuly Fermni MD  Office: [phone]  Cell: [phone]      Date of Service:  9/3/2017  NAME:  Thalia Mario Sr.  :  1931  MRN:  [id number]      Admission Summary:   79 yo male with PMHx of Esophageal Ca with Mets, undergoing Chemo and RTx, HTN, HLD, RLS admitted from home for agitation and confusion. Pt has been taking compazine and ativan for cancer related nausea and vomiting. Pt had similar episode 2 weeks ago as well. Daughter takes care of patient home but stating that it has become burdensome for her due to his memory issues and confusions.      Interval history / Subjective:     Pt seen and examined  Pleasant  Per RN, on going nausea overnight  On my interview, no nausea, feeling \"fine\"  Still delusional  Daughter requested hospice      Assessment & Plan:     Metabolic Encephalopathy due to medications (Ativan/Steroids after chemo)  - avoid ativan   - c/w Ativan PRN    CKD 3 stable    Hyperkalemia- resolved     Hyponatremia POA  - will monitor   - improving     Esophageal Ca with Mets  - undergoing Chemo and RTx to GE Jxn (7 of  on )  - oncology follow up as outpt  - no plan for further chemo given decline in performance status  - hospice consulted    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, with behavioral disturbances  - reorient  - increased Seroquel to 50mg HS  - prn Haldol    BPH   - c/w Proscar and Flomax    Dysphagia due to Esophageal Ca  - mechanical soft diet per speech eval   - Tube feeding    Code status: DNR  DVT prophylaxis: SCDs    Care Plan discussed with: Patient/Family and Nurse  Disposition: [address] [place] [address] Problems  Date Reviewed: 2017          Codes Class Noted POA    * (Principal)Altered mental status ICD-10-CM: R41.82  ICD-9-CM: 780.97  2017 Unknown                Review of Systems:   A comprehensive review of systems was negative except for that written in the HPI. Vital Signs:    Last 24hrs VS reviewed since prior progress note. Most recent are:  Visit Vitals    /76 (BP 1 Location: Left arm, BP Patient Position: At rest)    Pulse 88    Temp 98.2 °F (36.8 °C)    Resp 16    Wt 81.4 kg (179 lb 8 oz)    SpO2 96%    BMI 24.34 kg/m2         Intake/Output Summary (Last 24 hours) at 09/03/17 1200  Last data filed at 09/03/17 0908   Gross per 24 hour   Intake             1180 ml   Output                0 ml   Net             1180 ml        Physical Examination:             Constitutional:  No acute distress, cooperative, pleasant    ENT:  Oral mucous moist   Resp:  CTA bilaterally. CV:  Regular rhythm, normal rate    GI:  Soft, non distended, non tender. normoactive bowel sounds, PEG in place    Musculoskeletal:  No edema, warm, 2+ pulses throughout    Neurologic:  Moves all extremities. AAOx2     Psych:  paranoid, emotional, poor judgement and insight       Data Review:    Review and/or order of clinical lab test  Review and/or order of tests in the radiology section of CPT  Review and/or order of tests in the medicine section of CPT      Labs:     No results for input(s): WBC, HGB, HCT, PLT, HGBEXT, HCTEXT, PLTEXT, HGBEXT, HCTEXT, PLTEXT in the last 72 hours. Recent Labs      09/01/17   0634   NA  135*   K  4.4   CL  104   CO2  23   BUN  19   CREA  1.17   GLU  100   CA  8.9     No results for input(s): SGOT, GPT, ALT, AP, TBIL, TBILI, TP, ALB, GLOB, GGT, AML, LPSE in the last 72 hours. No lab exists for component: AMYP, HLPSE  No results for input(s): INR, PTP, APTT in the last 72 hours. No lab exists for component: INREXT, INREXT   No results for input(s): FE, TIBC, PSAT, FERR in the last 72 hours. Lab Results   Component Value Date/Time    Folate 11.2 09/16/2016 11:03 AM      No results for input(s): PH, PCO2, PO2 in the last 72 hours. No results for input(s): CPK, CKNDX, TROIQ in the last 72 hours.     No lab exists for component: CPKMB  Lab Results   Component Value Date/Time    Cholesterol, total 197 04/05/2017 03:43 PM    HDL Cholesterol 40 04/05/2017 03:43 PM    LDL, calculated 154 09/16/2016 11:03 AM    Triglyceride 124 09/16/2016 11:03 AM    CHOL/HDL Ratio 4.5 10/27/2010 07:50 AM     Lab Results   Component Value Date/Time    Glucose (POC) 95 09/03/2017 06:30 AM    Glucose (POC) 99 09/02/2017 09:03 PM    Glucose (POC) 132 09/02/2017 04:33 PM    Glucose (POC) 106 09/02/2017 11:35 AM    Glucose (POC) 98 09/02/2017 06:32 AM     Lab Results   Component Value Date/Time    Color YELLOW/STRAW 08/31/2017 12:31 PM    Appearance CLEAR 08/31/2017 12:31 PM    Specific gravity 1.010 08/31/2017 12:31 PM    pH (UA) 6.5 08/31/2017 12:31 PM    Protein NEGATIVE  08/31/2017 12:31 PM    Glucose NEGATIVE  08/31/2017 12:31 PM    Ketone NEGATIVE  08/31/2017 12:31 PM    Bilirubin NEGATIVE  08/31/2017 12:31 PM    Urobilinogen 1.0 08/31/2017 12:31 PM    Nitrites NEGATIVE  08/31/2017 12:31 PM    Leukocyte Esterase NEGATIVE  08/31/2017 12:31 PM    Epithelial cells FEW 08/31/2017 12:31 PM    Bacteria NEGATIVE  08/31/2017 12:31 PM    WBC 0-4 08/31/2017 12:31 PM    RBC  08/31/2017 12:31 PM         Medications Reviewed:     Current Facility-Administered Medications   Medication Dose Route Frequency    promethazine (PHENERGAN) tablet 25 mg  25 mg Oral Q6H PRN    QUEtiapine (SEROquel) tablet 50 mg  50 mg Oral QHS    sodium chloride (NS) flush 5-10 mL  5-10 mL IntraVENous Q8H    sodium chloride (NS) flush 5-10 mL  5-10 mL IntraVENous PRN    aspirin delayed-release tablet 81 mg  81 mg Oral DAILY    finasteride (PROSCAR) tablet 5 mg  5 mg Oral DAILY    pantoprazole (PROTONIX) tablet 40 mg  40 mg Oral ACB    tamsulosin (FLOMAX) capsule 0.8 mg  0.8 mg Oral DAILY    traMADol (ULTRAM) tablet 50 mg  50 mg Oral Q8H PRN    sucralfate (CARAFATE) 100 mg/mL oral suspension 1 g  1 g Oral QID PRN    ondansetron (ZOFRAN) injection 4 mg  4 mg IntraVENous Q6H PRN    haloperidol lactate (HALDOL) injection 2 mg  2 mg IntraMUSCular Q6H PRN    glucose chewable tablet 16 g  4 Tab Oral PRN    dextrose (D50W) injection syrg 12.5-25 g  12.5-25 g IntraVENous PRN    glucagon (GLUCAGEN) injection 1 mg  1 mg IntraMUSCular PRN    insulin lispro (HUMALOG) injection   SubCUTAneous AC&HS     ______________________________________________________________________  EXPECTED LENGTH OF STAY: 2d 9h  ACTUAL LENGTH OF STAY:          3                 Richardson MD Justice

## 2017-09-03 NOTE — PROGRESS NOTES
Called at 8. 15 pm by the daughter who was upset, sounded exhausted. She stated that her father is nauseous and we are not giving him appropriate medications and she would rather just have him go to sleep for good than suffer. In talking to the nurse it appears that he developed agitation when given Compazine. He has clear psychosis after Decadron and ativan. Zyprexa, reglan may also make confusion worse. That unfortunately leaves us with Zofran only which he is unfortunately not obtaining much relief with. She expressed her anger towards me when I suggested that she take some time and not rush into withdrawing care tonight. We have had prior clear conversations about NO MORE chemotherapy but as of yesterday daughter and son had agreed to palliative XRT alone. Neetu Hansen, Bandar's son and POA then spoke with me. He acknowledged our limitations in managing his nausea due to medication intolerance. He stated that father is lucid now and they have all together made a decision to proceed with Hospice. They no longer want radiation. I spoke to the nurse Jeanne Hankins and will put in a referral for Hospice . The family would prefer inpatient Hospice. They request this be done asap. In the meantime, I will add 12.5 mg of phenergan at night time in the hopes to help his nausea. They understand that most medications come with the risk of excessive sedation and confusion.     Fidelina Aceves MD, [phone] [facility]

## 2017-09-03 NOTE — HOSPICE
[facility]  [phone]    Patient Name: Ernestine Malin. YOB: 1931  Age: 80 y.o. [facility] RN Note:  Hospice consult noted. Chart reviewed. Plan of care discussed with patients nurse & care manager. In to meet with patient, his children Emi Barbosa and Romero Salazar philosophy, general plan of care, levels of care, services and on call procedures. Family information packet provided & reviewed with Emi Barbosa. Discussed option of hospice care at the [facility], including the daily personal cost of $256.00 per day at the routine level of care and a three week maximum length of stay. Patient has some additional insurance benefits including long term care through his half-way with [employer]. Will request  assistance in determining what benefits patient has. Will follow up tomorrow. Thank you for the opportunity to be of service to this patient.     Lesly Davey RN, MSN, FNP  Hospice Clinical Liasion

## 2017-09-03 NOTE — PROGRESS NOTES
Bedside and Verbal shift change report given to Ranjith Ahuja RN (oncoming nurse) by Jeanne Hankins RN (offgoing nurse). Report included the following information SBAR, Kardex, Intake/Output, MAR, Accordion and Recent Results.

## 2017-09-03 NOTE — HOSPICE
[facility]  [phone]     Patient Name: Vahe Perez. YOB: 1931  Age: 80 y.o. [facility] RN Note:  Hospice consult received, reviewing chart. Will follow up with Unit Nurse and Care Manager to discuss plan of care, patient status and discharge disposition    Thank you for the opportunity to be of service to this patient.     Tram Og RN, MSN, FNP  Hospice Clinical Liasion

## 2017-09-04 NOTE — PROGRESS NOTES
Problem: Falls - Risk of  Goal: *Absence of Falls  Document Billie Fall Risk and appropriate interventions in the flowsheet.    Outcome: Progressing Towards Goal  Fall Risk Interventions:  Mobility Interventions: Bed/chair exit alarm, Patient to call before getting OOB     Mentation Interventions: Bed/chair exit alarm, Door open when patient unattended, Room close to nurse's station     Medication Interventions: Bed/chair exit alarm, Patient to call before getting OOB     Elimination Interventions: Bed/chair exit alarm, Call light in reach, Patient to call for help with toileting needs

## 2017-09-04 NOTE — PROGRESS NOTES
Bedside shift change report given to Aidan Cain (oncoming nurse) by Emi MCCAULEY/Mariela (offgoing nurse). Report included the following information SBAR.

## 2017-09-04 NOTE — PROGRESS NOTES
Hospitalist Progress Note  Vandana Michael MD  Office: [phone]  Cell: [phone]      Date of Service:  2017  NAME:  Tammie Morin Sr.  :  1931  MRN:  [id number]      Admission Summary:   81 yo male with PMHx of Esophageal Ca with Mets, undergoing Chemo and RTx, HTN, HLD, RLS admitted from home for agitation and confusion. Pt has been taking compazine and ativan for cancer related nausea and vomiting. Pt had similar episode 2 weeks ago as well. Daughter takes care of patient home but stating that it has become burdensome for her due to his memory issues and confusions. Interval history / Subjective:     Pt seen and examined  Pleasant  No new issues  Plan to go to hospice      Assessment & Plan:     Metabolic Encephalopathy due to medications (Ativan/Steroids after chemo)  - avoid ativan   - c/w Ativan PRN    CKD 3 stable    Hyperkalemia- resolved     Hyponatremia POA  - will monitor   - improving     Esophageal Ca with Mets  - undergoing Chemo and RTx to [facility] (7 of  on )  - oncology follow up as outpt  - no plan for further chemo given decline in performance status  - hospice consulted, plan to d/c to hospice when accepted. 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, with behavioral disturbances  - reorient  - increased Seroquel to 50mg HS  - prn Haldol    BPH   - c/w Proscar and Flomax    Dysphagia due to Esophageal Ca  - mechanical soft diet per speech eval   - Tube feeding    Code status: DNR  DVT prophylaxis: SCDs    Care Plan discussed with: Patient/Family and Nurse  Disposition: Hospice when able      Hospital Problems  Date Reviewed: 2017          Codes Class Noted POA    * (Principal)Altered mental status ICD-10-CM: R41.82  ICD-9-CM: 780.97  2017 Unknown                Review of Systems:   A comprehensive review of systems was negative except for that written in the HPI.        Vital Signs:    Last 24hrs VS reviewed since prior progress note. Most recent are:  Visit Vitals    /86    Pulse 83    Temp 98.5 °F (36.9 °C)    Resp 16    Wt 81.4 kg (179 lb 8 oz)    SpO2 96%    BMI 24.34 kg/m2         Intake/Output Summary (Last 24 hours) at 09/04/17 1421  Last data filed at 09/04/17 1039   Gross per 24 hour   Intake              920 ml   Output                0 ml   Net              920 ml        Physical Examination:             Constitutional:  No acute distress, cooperative, pleasant    ENT:  Oral mucous moist   Resp:  CTA bilaterally. CV:  Regular rhythm, normal rate    GI:  Soft, non distended, non tender. normoactive bowel sounds, PEG in place    Musculoskeletal:  No edema, warm, 2+ pulses throughout    Neurologic:  Moves all extremities. AAOx2     Psych:  paranoid, emotional, poor judgement and insight       Data Review:    Review and/or order of clinical lab test  Review and/or order of tests in the radiology section of CPT  Review and/or order of tests in the medicine section of CPT      Labs:     No results for input(s): WBC, HGB, HCT, PLT, HGBEXT, HCTEXT, PLTEXT, HGBEXT, HCTEXT, PLTEXT in the last 72 hours. No results for input(s): NA, K, CL, CO2, BUN, CREA, GLU, CA, MG, PHOS, URICA in the last 72 hours. No results for input(s): SGOT, GPT, ALT, AP, TBIL, TBILI, TP, ALB, GLOB, GGT, AML, LPSE in the last 72 hours. No lab exists for component: AMYP, HLPSE  No results for input(s): INR, PTP, APTT in the last 72 hours. No lab exists for component: INREXT, INREXT   No results for input(s): FE, TIBC, PSAT, FERR in the last 72 hours. Lab Results   Component Value Date/Time    Folate 11.2 09/16/2016 11:03 AM      No results for input(s): PH, PCO2, PO2 in the last 72 hours. No results for input(s): CPK, CKNDX, TROIQ in the last 72 hours.     No lab exists for component: CPKMB  Lab Results   Component Value Date/Time    Cholesterol, total 197 04/05/2017 03:43 PM    HDL Cholesterol 40 04/05/2017 03:43 PM    LDL, calculated 154 09/16/2016 11:03 AM    Triglyceride 124 09/16/2016 11:03 AM    CHOL/HDL Ratio 4.5 10/27/2010 07:50 AM     Lab Results   Component Value Date/Time    Glucose (POC) 108 09/04/2017 11:16 AM    Glucose (POC) 116 09/04/2017 06:48 AM    Glucose (POC) 111 09/03/2017 09:41 PM    Glucose (POC) 98 09/03/2017 04:41 PM    Glucose (POC) 105 09/03/2017 11:56 AM     Lab Results   Component Value Date/Time    Color YELLOW/STRAW 08/31/2017 12:31 PM    Appearance CLEAR 08/31/2017 12:31 PM    Specific gravity 1.010 08/31/2017 12:31 PM    pH (UA) 6.5 08/31/2017 12:31 PM    Protein NEGATIVE  08/31/2017 12:31 PM    Glucose NEGATIVE  08/31/2017 12:31 PM    Ketone NEGATIVE  08/31/2017 12:31 PM    Bilirubin NEGATIVE  08/31/2017 12:31 PM    Urobilinogen 1.0 08/31/2017 12:31 PM    Nitrites NEGATIVE  08/31/2017 12:31 PM    Leukocyte Esterase NEGATIVE  08/31/2017 12:31 PM    Epithelial cells FEW 08/31/2017 12:31 PM    Bacteria NEGATIVE  08/31/2017 12:31 PM    WBC 0-4 08/31/2017 12:31 PM    RBC  08/31/2017 12:31 PM         Medications Reviewed:     Current Facility-Administered Medications   Medication Dose Route Frequency    promethazine (PHENERGAN) tablet 25 mg  25 mg Oral Q6H PRN    QUEtiapine (SEROquel) tablet 50 mg  50 mg Oral QHS    sodium chloride (NS) flush 5-10 mL  5-10 mL IntraVENous Q8H    sodium chloride (NS) flush 5-10 mL  5-10 mL IntraVENous PRN    aspirin delayed-release tablet 81 mg  81 mg Oral DAILY    finasteride (PROSCAR) tablet 5 mg  5 mg Oral DAILY    pantoprazole (PROTONIX) tablet 40 mg  40 mg Oral ACB    tamsulosin (FLOMAX) capsule 0.8 mg  0.8 mg Oral DAILY    traMADol (ULTRAM) tablet 50 mg  50 mg Oral Q8H PRN    sucralfate (CARAFATE) 100 mg/mL oral suspension 1 g  1 g Oral QID PRN    ondansetron (ZOFRAN) injection 4 mg  4 mg IntraVENous Q6H PRN    haloperidol lactate (HALDOL) injection 2 mg  2 mg IntraMUSCular Q6H PRN    glucose chewable tablet 16 g  4 Tab Oral PRN    dextrose (D50W) injection syrg 12.5-25 g  12.5-25 g IntraVENous PRN    glucagon (GLUCAGEN) injection 1 mg  1 mg IntraMUSCular PRN    insulin lispro (HUMALOG) injection   SubCUTAneous AC&HS     ______________________________________________________________________  EXPECTED LENGTH OF STAY: 2d 9h  ACTUAL LENGTH OF STAY:          Thang Solis MD

## 2017-09-04 NOTE — HOSPICE
Hospice Clinical Liaison    Follow up with patient. No family members present. Patient states they are still considering their options. Thank you for the opportunity to serve this patient and family.     Betsy Portillo RN, MSN. [facility]  Hospice Clinical Liasion

## 2017-09-05 NOTE — PROGRESS NOTES
Problem: Falls - Risk of  Goal: *Absence of Falls  Document Billie Fall Risk and appropriate interventions in the flowsheet.    Outcome: Progressing Towards Goal  Fall Risk Interventions:  Mobility Interventions: Bed/chair exit alarm, Patient to call before getting OOB     Mentation Interventions: Bed/chair exit alarm, Door open when patient unattended, More frequent rounding, Room close to nurse's station     Medication Interventions: Bed/chair exit alarm, Patient to call before getting OOB     Elimination Interventions: Bed/chair exit alarm, Call light in reach, Patient to call for help with toileting needs, Toileting schedule/hourly rounds

## 2017-09-05 NOTE — PROGRESS NOTES
Problem: Mobility Impaired (Adult and Pediatric)  Goal: *Acute Goals and Plan of Care (Insert Text)  Physical Therapy Goals  Initiated 8/31/2017  1. Patient will move from supine to sit and sit to supine in bed with supervision/set-up within 5 day(s). 2. Patient will transfer from bed to chair and chair to bed with supervision/set-up using the least restrictive device within 5 day(s). 3. Patient will perform sit to stand with supervision/set-up within 5 day(s). 4. Patient will ambulate with supervision/set-up for 100 feet with the least restrictive device within 5 day(s). PHYSICAL THERAPY TREATMENT  Patient: Farshad Phillips Sr. (80 y.o. male)  Date: 9/5/2017  Diagnosis: Altered mental status Altered mental status       Precautions: Bed Alarm, Fall      ASSESSMENT:  Patient received sitting up on EOB with nursing preparing to go to bathroom. Once nurse left, patient declined needing toilet but agreeable to get to chair. Stood with supervision, reached down to floor to retrieve piece of trash with steady stance. Ambulated to chair but declined further gait due to fatigue. Returned later to try again and again declined. Left up in chair with chair alarm activated. Left with newspaper and all needs met. Anticipate home later with family and possible hospice. Progression toward goals:  [ ]    Improving appropriately and progressing toward goals  [ ]    Improving slowly and progressing toward goals  [ ]    Not making progress toward goals and plan of care will be adjusted       PLAN:  Patient continues to benefit from skilled intervention to address the above impairments. Continue treatment per established plan of care. Discharge Recommendations: To Be Determined  Further Equipment Recommendations for Discharge:  none       SUBJECTIVE:   Patient stated I have cancer. I feel rough.       OBJECTIVE DATA SUMMARY:   Critical Behavior:  Neurologic State: Lethargic  Orientation Level: Disoriented to place, Disoriented to situation, Disoriented to time, Oriented to person  Cognition: Follows commands, Poor safety awareness  Safety/Judgement: Decreased awareness of environment, Decreased awareness of need for assistance, Decreased awareness of need for safety, Decreased insight into deficits  Functional Mobility Training:     Transfers:  Sit to Stand: Supervision  Stand to Sit: Supervision                             Balance:  Sitting: Intact  Standing: Impaired; Without support  Standing - Static: Fair  Standing - Dynamic : Fair  Ambulation/Gait Training:  Distance (ft): 15 Feet (ft)  Assistive Device: Gait belt  Ambulation - Level of Assistance: Supervision        Gait Abnormalities: Decreased step clearance        Base of Support: Narrowed     Speed/Rocio: Pace decreased (<100 feet/min)      After treatment:   [X]    Patient left in no apparent distress sitting up in chair  [ ]    Patient left in no apparent distress in bed  [X]    Call bell left within reach  [ ]    Nursing notified  [ ]    Caregiver present  [X]    Bed alarm activated      COMMUNICATION/COLLABORATION:   The patients plan of care was discussed with: Registered Nurse     Domingo Sheldon, PT   Time Calculation: 10 mins

## 2017-09-05 NOTE — PROGRESS NOTES
Bedside and Verbal shift change report given to Katrina Montes De Oca RN (oncoming nurse) by Karin Hernandez RN (offgoing nurse). Report included the following information SBAR, Kardex, MAR and Recent Results.

## 2017-09-05 NOTE — HOSPICE
[facility]  [phone]    Patient Name: Pranav Lamb. YOB: 1931  Age: 80 y.[facility]. [facility]  Hospice consult noted. Chart reviewed. Plan of care discussed with patients nurse & care manager. In to meet with son Lea Lemus and dtr Angelika Valle. Discussed Hospice philosophy, general plan of care, levels of care, services and on call procedures. Family information packet provided & reviewed with them. Lea Lemus states he will call pt's LTC insurance policy to see if they will cover stay at [facility]. Dtr Angelika Valle is willing to take pt home with hospice support. Will follow up in morning. Thank you for the opportunity to be of service to this patient.     Valentino Michelle RN

## 2017-09-05 NOTE — PROGRESS NOTES
Bedside shift change report given to Julien RN (oncoming nurse) by Jose Arceo RN (offgoing nurse). Report included the following information SBAR, Kardex, ED Summary, [facility] and Recent Results.

## 2017-09-05 NOTE — PROGRESS NOTES
Signed equipment order for patient faxed complete to [address] [place] [address], to [facility] for scanning.

## 2017-09-05 NOTE — PROGRESS NOTES
Bedside shift change report given to Cathy Hwang RN (oncoming nurse) by Sherrell Cervantes RN (offgoing nurse). Report included the following information SBAR and Kardex.

## 2017-09-06 NOTE — PROGRESS NOTES
Problem: Falls - Risk of  Goal: *Absence of Falls  Document Billie Fall Risk and appropriate interventions in the flowsheet.    Outcome: Progressing Towards Goal  Fall Risk Interventions:  Mobility Interventions: Bed/chair exit alarm     Mentation Interventions: Bed/chair exit alarm     Medication Interventions: Patient to call before getting OOB     Elimination Interventions: Call light in reach

## 2017-09-06 NOTE — PROGRESS NOTES
Occupational Therapy: Noted patient for discharge in AM with hospice. Will discharge from OT services.    PAVEL Herrera/ELAN

## 2017-09-06 NOTE — PROGRESS NOTES
Hospitalist Progress Note  Namrata Sheth MD  Office: [phone]  Cell:       Date of Service:  2017  NAME:  Disha Rey Sr.  :  1931  MRN:  [id number]      Admission Summary:   79 yo male with PMHx of Esophageal Ca with Mets, undergoing Chemo and RTx, HTN, HLD, RLS admitted from home for agitation and confusion. Pt has been taking compazine and ativan for cancer related nausea and vomiting. Pt had similar episode 2 weeks ago as well. Daughter takes care of patient home but stating that it has become burdensome for her due to his memory issues and confusions.      Interval history / Subjective:     F/u esophageal cancer with mets  Pt seen and examined  Pleasant  Still complains of nausea  Plan to go to hospice      Assessment & Plan:     Metabolic Encephalopathy due to medications (Ativan/Steroids after chemo)  - avoid ativan   - c/w Ativan PRN    CKD 3 stable    Hyperkalemia- resolved     Hyponatremia POA  - will monitor   - improving     Esophageal Ca with Mets  - undergoing Chemo and RTx to [facility] ( on )  - oncology follow up as outpt  - no plan for further chemo given decline in performance status  -patient continues to have nausea  - hospice spoke to the family today     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, with behavioral disturbances  - reorient  - increased Seroquel to 50mg HS  - prn Haldol    BPH   - c/w Proscar and Flomax    Dysphagia due to Esophageal Ca  - mechanical soft diet per speech eval   - Tube feeding    Code status: DNR  DVT prophylaxis: SCDs    Plan: Discharge with hospice ?today vs tomorrow    Care Plan discussed with: Patient/Family and Nurse  Disposition: Hospice when able      Hospital Problems  Date Reviewed: 2017          Codes Class Noted POA    * (Principal)Altered mental status ICD-10-CM: R41.82  ICD-9-CM: 780.97  2017 Unknown                Review of Systems:   A comprehensive review of systems was negative except for that written in the HPI. Vital Signs:    Last 24hrs VS reviewed since prior progress note. Most recent are:  Visit Vitals    /78 (BP 1 Location: Left arm, BP Patient Position: At rest)    Pulse 86    Temp 97.7 °F (36.5 °C)    Resp 16    Wt 76 kg (167 lb 8.8 oz)    SpO2 96%    BMI 22.72 kg/m2         Intake/Output Summary (Last 24 hours) at 09/06/17 0757  Last data filed at 09/06/17 0553   Gross per 24 hour   Intake             1600 ml   Output                0 ml   Net             1600 ml        Physical Examination:             Constitutional:  No acute distress, cooperative, pleasant    ENT:  Oral mucous moist   Resp:  CTA bilaterally. CV:  Regular rhythm, normal rate    GI:  Soft, non distended, non tender. normoactive bowel sounds, PEG in place    Musculoskeletal:  No edema, warm, 2+ pulses throughout    Neurologic:  Moves all extremities. AAOx2     Psych:  paranoid, emotional, poor judgement and insight       Data Review:    Review and/or order of clinical lab test  Review and/or order of tests in the radiology section of CPT  Review and/or order of tests in the medicine section of CPT      Labs:     No results for input(s): WBC, HGB, HCT, PLT, HGBEXT, HCTEXT, PLTEXT, HGBEXT, HCTEXT, PLTEXT in the last 72 hours. No results for input(s): NA, K, CL, CO2, BUN, CREA, GLU, CA, MG, PHOS, URICA in the last 72 hours. No results for input(s): SGOT, GPT, ALT, AP, TBIL, TBILI, TP, ALB, GLOB, GGT, AML, LPSE in the last 72 hours. No lab exists for component: AMYP, HLPSE  No results for input(s): INR, PTP, APTT in the last 72 hours. No lab exists for component: INREXT, INREXT   No results for input(s): FE, TIBC, PSAT, FERR in the last 72 hours. Lab Results   Component Value Date/Time    Folate 11.2 09/16/2016 11:03 AM      No results for input(s): PH, PCO2, PO2 in the last 72 hours.   No results for input(s): CPK, CKNDX, TROIQ in the last 72 hours.     No lab exists for component: CPKMB  Lab Results   Component Value Date/Time    Cholesterol, total 197 04/05/2017 03:43 PM    HDL Cholesterol 40 04/05/2017 03:43 PM    LDL, calculated 154 09/16/2016 11:03 AM    Triglyceride 124 09/16/2016 11:03 AM    CHOL/HDL Ratio 4.5 10/27/2010 07:50 AM     Lab Results   Component Value Date/Time    Glucose (POC) 129 09/06/2017 06:29 AM    Glucose (POC) 164 09/05/2017 09:34 PM    Glucose (POC) 100 09/05/2017 04:16 PM    Glucose (POC) 149 09/05/2017 11:40 AM    Glucose (POC) 116 09/05/2017 07:15 AM     Lab Results   Component Value Date/Time    Color YELLOW/STRAW 08/31/2017 12:31 PM    Appearance CLEAR 08/31/2017 12:31 PM    Specific gravity 1.010 08/31/2017 12:31 PM    pH (UA) 6.5 08/31/2017 12:31 PM    Protein NEGATIVE  08/31/2017 12:31 PM    Glucose NEGATIVE  08/31/2017 12:31 PM    Ketone NEGATIVE  08/31/2017 12:31 PM    Bilirubin NEGATIVE  08/31/2017 12:31 PM    Urobilinogen 1.0 08/31/2017 12:31 PM    Nitrites NEGATIVE  08/31/2017 12:31 PM    Leukocyte Esterase NEGATIVE  08/31/2017 12:31 PM    Epithelial cells FEW 08/31/2017 12:31 PM    Bacteria NEGATIVE  08/31/2017 12:31 PM    WBC 0-4 08/31/2017 12:31 PM    RBC  08/31/2017 12:31 PM         Medications Reviewed:     Current Facility-Administered Medications   Medication Dose Route Frequency    promethazine (PHENERGAN) tablet 25 mg  25 mg Oral Q6H    QUEtiapine (SEROquel) tablet 50 mg  50 mg Oral QHS    sodium chloride (NS) flush 5-10 mL  5-10 mL IntraVENous Q8H    sodium chloride (NS) flush 5-10 mL  5-10 mL IntraVENous PRN    aspirin delayed-release tablet 81 mg  81 mg Oral DAILY    finasteride (PROSCAR) tablet 5 mg  5 mg Oral DAILY    pantoprazole (PROTONIX) tablet 40 mg  40 mg Oral ACB    tamsulosin (FLOMAX) capsule 0.8 mg  0.8 mg Oral DAILY    traMADol (ULTRAM) tablet 50 mg  50 mg Oral Q8H PRN    sucralfate (CARAFATE) 100 mg/mL oral suspension 1 g  1 g Oral QID PRN    ondansetron (ZOFRAN) injection 4 mg  4 mg IntraVENous Q6H PRN    haloperidol lactate (HALDOL) injection 2 mg  2 mg IntraMUSCular Q6H PRN    glucose chewable tablet 16 g  4 Tab Oral PRN    dextrose (D50W) injection syrg 12.5-25 g  12.5-25 g IntraVENous PRN    glucagon (GLUCAGEN) injection 1 mg  1 mg IntraMUSCular PRN    insulin lispro (HUMALOG) injection   SubCUTAneous AC&HS     ______________________________________________________________________  EXPECTED LENGTH OF STAY: 2d 9h  ACTUAL LENGTH OF STAY:          Tre Neal MD

## 2017-09-06 NOTE — PROGRESS NOTES
Physical Therapy  Attempted to work with patient. Received lying in bed and acknowledged me stating \"I refused yesterday didn't I? \"  Confirmed with patient and he indicated not wanting to get up. Sat and talked with patient as he became tearful and reminiscent. Left resting in bed. Noted that patient to discharge tomorrow morning with family, going home on hospice.   Alaina Katz, PT

## 2017-09-06 NOTE — PROGRESS NOTES
Bedside shift change report given to Sade Velásquez RN (oncoming nurse) by Tami Haq RN (offgoing nurse). Report included the following information SBAR and Kardex.

## 2017-09-06 NOTE — PROGRESS NOTES
Problem: Falls - Risk of  Goal: *Absence of Falls  Document Billie Fall Risk and appropriate interventions in the flowsheet.    Outcome: Progressing Towards Goal  Fall Risk Interventions:  Mobility Interventions: Bed/chair exit alarm, Patient to call before getting OOB, Utilize walker, cane, or other assitive device     Mentation Interventions: Bed/chair exit alarm, Door open when patient unattended, More frequent rounding     Medication Interventions: Patient to call before getting OOB, Teach patient to arise slowly, Bed/chair exit alarm     Elimination Interventions: Call light in reach, Bed/chair exit alarm, Patient to call for help with toileting needs, Urinal in reach

## 2017-09-06 NOTE — HOSPICE
[facility]  [phone]    Patient Name: Ophelia Yeh. YOB: 1931  Age: 80 y.o. [facility] RN Note:     Spoke to Ondina Potts dtr. She wants to take pt home tomorrow late morning by private car for 2:30p.m. Hospice admission. Randy MTZ delivery today: bed, rails, overlay, OTB table, BSC, shower chair. Richelle Jain #[id number]    Thank you for the opportunity to be of service to this patient.

## 2017-09-06 NOTE — PROGRESS NOTES
Bedside shift change report given to [address] [place] [address] Rd (oncoming nurse) by Julien RN (offgoing nurse). Report included the following information SBAR, MAR, Recent Results and Med Rec Status.

## 2017-09-07 NOTE — HOSPICE
[facility]  [phone]    Inpatient Nursing PRE Admission   Patient Name: Earnestine Chery. YOB: 1931  Age: 80 y.o. Date of PLANNED Hospice Admission: 17  Hospice Attending: Jennifer Braun MD  Primary Care Physician: Jaylon Najera MD     Home Hospice Zip Code: [address]  Expected  (if patient transferred to [facility]): ADVANCE CARE PLANNING    Code Status: DNR  Durable DNR:  [x]  No  Advance Care Planning 2017   Patient's Healthcare Decision Maker is: Legal Next of Kin   Primary Decision Maker Name -----   Primary Decision Maker Phone Number -[phone]   Primary Decision Maker Relationship to Patient Adult child   Secondary Decision Maker Name -   Secondary Decision [address] Phone Number -   Secondary Decision Maker Relationship to Patient -   Confirm Advance Directive Yes, on file   Does the patient have other document types Do Not Resuscitate; Power of        Sabianist: [address]: TBD    HOSPICE SUMMARY   ER Visits/ Hospitalizations in past year: 2  Hospice Diagnosis: Esophageal CA with mets  Onset Date of Hospice Diagnosis: 2017    Summary of Disease Progression Leading to Hospice Diagnosis:     Isabell Hernandez is an 81yo male who was admitted to [address] [place] [address] on 17 from home with AMS, dizziness and dysphagia. He has just been admitted - for same symptoms when an esophageal stent, poracath, and ultimately a PEG tube was placed. His past medical history includes GE junction carcinoma with mets, dysphagia, CKD III, OA, chronic pain, left hip pain, HTN, HLP, RLS. He recently had been receiving chemotherapy and side effects of confusion, nausea. His labs showed elevated BUN 22, creatinine 1.50. Pt was admitted and started on IVF. He condition continues to decline with weakness, debility and nausea. He is intolerant of compazine, zofran, haldol, ativan per family who state he experiences AMS.  Family wish him to return home and discussed decreasing tube feedings from TwoCal HN 5 cans to 3. Dr. Rafa Mckay was contacted to be attending. Co-Morbidities:   Patient Active Problem List   Diagnosis Code    Left knee DJD M17.12    HTN (hypertension) I10    Hypercholesterolemia E78.00    Urinary frequency R35.0    Insomnia G47.00    Kidney function abnormal N28.9    Elevated PSA measurement R97.20    B12 deficiency E53.8    Vitamin D deficiency E55.9    AR (allergic rhinitis) J30.9    Acute bronchitis J20.9    Special screening for osteoporosis Z13.820    Tingling sensation R20.2    Hip pain, left M25.552    Back pain, chronic M54.9, G89.29    Need for varicella vaccine Z23    Chronic constipation K59.09    Rash of entire body R21    DNR (do not resuscitate) Z66    Decreased hearing of both ears H91.93    Encounter for Hemoccult screening Z12.11    Gastroesophageal reflux disease without esophagitis K21.9    GE junction carcinoma (HCC) C16.0    Esophageal dysphagia R13.14    Pain, cancer G89.3    Dysphasia R47.02    Physical deconditioning R53.81    Chemotherapy-induced nausea R11.0, T45.1X5A    Altered mental status R41.82     Diagnoses RELATED to the terminal prognosis: Esophageal CA of GE junction  Other Diagnoses: Nausea, Chronic pain, PEG tube, CKD III, AMS, debilty    Rationale for a prognosis of life expectancy of 6 months or less if the disease follows its normal course (Disease Specific History): Noreen Eagle is a 80 y.o. who was admitted to [address] [place] [address]. The patient's principle diagnosis of Esophageal CA has resulted in PEG tube feedings with nausea, debility, weakness, confusion. Functionally, the patient's Palliative Performance Scale has declined over a period of four weeks and is estimated at 30%. Objective information that support this patients limited prognosis includes:    CT Abd/Pelvis 7/6/17: IMPRESSION:  1.   There is mildly prominent rectal retention. 2. 4.3 cm ascending aortic aneurysm, coronary artery disease, left and sigmoid  colon diverticulosis, right renal atrophy, and additional incidental findings as  above. 3. No acute findings or other findings to correlate with weight loss or blood in  stool. The patient/family chose comfort measures with the support of Hospice. Patient meets for Routine LOC as evidenced by nausea, chronic pain, debility, weakness    Verbal certification of terminal diagnosis with life expectancy of 6 months or less received from: Dr. Afshan Noel. Prognosis estimated based on 09/06/17 clinical assessment is:     [x] Days to Weeks     CLINICAL INFORMATION   Allergies: No Known Allergies    Wt Readings from Last 3 Encounters:   09/06/17 76 kg (167 lb 8.8 oz)   08/30/17 82 kg (180 lb 12.8 oz)   08/23/17 81.5 kg (179 lb 9.6 oz)     Ht Readings from Last 3 Encounters:   08/30/17 6' 0.01\" (1.829 m)   08/23/17 6' (1.829 m)   08/23/17 6' (1.829 m)     Body mass index is 22.72 kg/(m^2). Visit Vitals    /76    Pulse 89    Temp 97.8 °F (36.6 °C)    Resp 15    Wt 76 kg (167 lb 8.8 oz)    SpO2 97%    BMI 22.72 kg/m2       LAB VALUES    Results for Twila Sherwood SR. (MRN [id number]) as of 9/6/2017 21:55   Ref. Range 8/31/2017 03:52   WBC Latest Ref Range: 4.1 - 11.1 K/uL 4.6   RBC Latest Ref Range: 4.10 - 5.70 M/uL 3.34 (L)   HGB Latest Ref Range: 12.1 - 17.0 g/dL 10.5 (L)   HCT Latest Ref Range: 36.6 - 50.3 % 30.6 (L)   MCV Latest Ref Range: 80.0 - 99.0 FL 91.6   MCH Latest Ref Range: 26.0 - 34.0 PG 31.4   MCHC Latest Ref Range: 30.0 - 36.5 g/dL 34.3   RDW Latest Ref Range: 11.5 - 14.5 % 13.3   PLATELET Latest Ref Range: 150 - 400 K/uL 318   NEUTROPHILS Latest Ref Range: 32 - 75 % 93 (H)   LYMPHOCYTES Latest Ref Range: 12 - 49 % 6 (L)   MONOCYTES Latest Ref Range: 5 - 13 % 1 (L)   EOSINOPHILS Latest Ref Range: 0 - 7 % 0   BASOPHILS Latest Ref Range: 0 - 1 % 0   DF Latest Units:   MANUAL   ABS.  NEUTROPHILS Latest Ref Range: 1.8 - 8.0 K/UL 4.3   ABS. LYMPHOCYTES Latest Ref Range: 0.8 - 3.5 K/UL 0.3 (L)   ABS. MONOCYTES Latest Ref Range: 0.0 - 1.0 K/UL 0.0   ABS. EOSINOPHILS Latest Ref Range: 0.0 - 0.4 K/UL 0.0   ABS. BASOPHILS Latest Ref Range: 0.0 - 0.1 K/UL 0.0     Results for Sung Phillips SR. (MRN [id number]) as of 9/6/2017 21:55   Ref. Range 9/1/2017 06:34   Sodium Latest Ref Range: 136 - 145 mmol/L 135 (L)   Potassium Latest Ref Range: 3.5 - 5.1 mmol/L 4.4   Chloride Latest Ref Range: 97 - 108 mmol/L 104   CO2 Latest Ref Range: 21 - 32 mmol/L 23   Anion gap Latest Ref Range: 5 - 15 mmol/L 8   Glucose Latest Ref Range: 65 - 100 mg/dL 100   BUN Latest Ref Range: 6 - 20 MG/DL 19   Creatinine Latest Ref Range: 0.70 - 1.30 MG/DL 1.17   BUN/Creatinine ratio Latest Ref Range: 12 - 20   16   Calcium Latest Ref Range: 8.5 - 10.1 MG/DL 8.9   GFR est non-AA Latest Ref Range: >60 ml/min/1.73m2 59 (L)   GFR est AA Latest Ref Range: >60 ml/min/1.73m2 >60       Lab Results   Component Value Date/Time    Protein, total 6.6 08/30/2017 10:40 PM    Albumin 3.0 08/30/2017 10:40 PM       Currently this patient has:  [x] Supplemental O2    [x] Port unaccessed  [x]  PEG Tube    Progression to DEPENDENCE WITH ADLs (include time frame): one week  x- Dependent for bathing: personal hygiene and grooming   x- Dependent for dressing: dressing and undressing   x Dependent for transferring: movement and mobility   x- Dependent for toileting: continence-related tasks including control and hygiene   x Dependent for eating: preparing food and feeding    ASSESSMENT & PLAN     1. Symptom Issues Identified: Nausea, chronic pain, debility    2. Spiritual Issues Identified: Voodoo sincere, comfort from spiritual beliefs    3. Psych/ Social/ Emotional Issues Identified: Pt is  for nine years, has son Emi Barbosa and dtr Severa Cruel, other son is not involved with care. Pt owns home but has been living with dtr.     4.  Care Coordination:   Transfer to: home    Report given to: Dr. Carroll Guido and admission nurse via email. Transportation by: private car   Scheduled for noon today for 2:30 admission    Medications Needs: Rx scripts given    DME: Randy delivery: bed: overlay, OTB table, BSC, shower chair    Supplies: chux, large briefs. Family has month supply of tube feed.

## 2017-09-07 NOTE — PROGRESS NOTES
Problem: Falls - Risk of  Goal: *Absence of Falls  Document Billie Fall Risk and appropriate interventions in the flowsheet.    Outcome: Progressing Towards Goal  Fall Risk Interventions:  Mobility Interventions: Bed/chair exit alarm     Mentation Interventions: Bed/chair exit alarm     Medication Interventions: Bed/chair exit alarm, Patient to call before getting OOB     Elimination Interventions: Call light in reach, Patient to call for help with toileting needs

## 2017-09-07 NOTE — PROGRESS NOTES
Problem: Falls - Risk of  Goal: *Absence of Falls  Document Billie Fall Risk and appropriate interventions in the flowsheet.    Outcome: Progressing Towards Goal  Fall Risk Interventions:  Mobility Interventions: Bed/chair exit alarm, Patient to call before getting OOB     Mentation Interventions: Bed/chair exit alarm, Door open when patient unattended, Familiar objects from home, More frequent rounding, Toileting rounds, Update white board, Room close to nurse's station     Medication Interventions: Bed/chair exit alarm, Patient to call before getting OOB     Elimination Interventions: Bed/chair exit alarm, Call light in reach, Patient to call for help with toileting needs, Toileting schedule/hourly rounds

## 2017-09-07 NOTE — DISCHARGE INSTRUCTIONS
Quetiapine (SEROquel, SEROquel XR, Seroquel XR 30-Day Sample Kit) - (By mouth)   Why this medicine is used:   Treats schizophrenia, bipolar disorder, or depression. Contact a nurse or doctor right away if you have:  · Fast, slow, pounding, or uneven heartbeat; lightheadedness or fainting  · Jerky uncontrollable muscle movements  · Fever, sweating, confusion, muscle stiffness  · Increased hunger or thirst, change in how much or how often you urinate     Common side effects:  · Sleepiness or unusual drowsiness, trouble with sleeping  · Increased appetite, weight gain  · Constipation, vomiting, nausea, dry mouth  · Headache  © 2017 [facility] Information is for End User's use only and may not be sold, redistributed or otherwise used for commercial purposes. Discharge Instructions       PATIENT ID: El Barry Sr. MRN: [id number]   YOB: 1931    DATE OF ADMISSION: 8/30/2017 10:33 PM    DATE OF DISCHARGE: 9/7/2017    PRIMARY CARE PROVIDER: Joana Chavez MD     ATTENDING PHYSICIAN: Paco Goss MD  DISCHARGING PROVIDER: Paco Goss MD    To contact this individual call [phone] and ask the  to page. If unavailable ask to be transferred the Adult Hospitalist Department. DISCHARGE DIAGNOSES   Esophageal cancer    CONSULTATIONS: IP CONSULT TO HOSPITALIST    PROCEDURES/SURGERIES: * No surgery found *    PENDING TEST RESULTS:   At the time of discharge the following test results are still pending: none    FOLLOW UP APPOINTMENTS:   Follow-up Information     Follow up With Details Comments Contact Info    Joana Chavez MD In 1 week  [address]              ADDITIONAL CARE RECOMMENDATIONS:   Follow up with PMD in 1 week    DIET: PEG feeding    ACTIVITY: Activity as tolerated      DISCHARGE MEDICATIONS:   See Medication Reconciliation Form    · It is important that you take the medication exactly as they are prescribed.    · Keep your medication in the bottles provided by the pharmacist and keep a list of the medication names, dosages, and times to be taken in your wallet. · Do not take other medications without consulting your doctor. NOTIFY YOUR PHYSICIAN FOR ANY OF THE FOLLOWING:   Fever over 101 degrees for 24 hours. Chest pain, shortness of breath, fever, chills, nausea, vomiting, diarrhea, change in mentation, falling, weakness, bleeding. Severe pain or pain not relieved by medications. Or, any other signs or symptoms that you may have questions about.       DISPOSITION:  x  Home With:   OT  PT  HH  RN       SNF/Inpatient Rehab/LTAC    Independent/assisted living   x Hospice    Other:     CDMP Checked:   Yes x     PROBLEM LIST Updated:  Yes x       Signed:   Imani Hernandez MD  9/7/2017  8:38 AM

## 2017-09-07 NOTE — PROGRESS NOTES
Bedside shift change report given to [address] [place] [address] Mic (oncoming nurse) by Balbina Bloom (offgoing nurse). Report included the following information SBAR, MAR, Recent Results and Med Rec Status.

## 2017-09-07 NOTE — DISCHARGE SUMMARY
Discharge Summary       PATIENT ID: Chris Ordaz Sr. MRN: [id number]   YOB: 1931    DATE OF ADMISSION: 8/30/2017 10:33 PM    DATE OF DISCHARGE: 9/7/2017   PRIMARY CARE PROVIDER: Ifeoma Griffin MD     ATTENDING PHYSICIAN: Dr Loly Huerta  DISCHARGING PROVIDER: Loly Huerta MD    To contact this individual call [phone] and ask the  to page. If unavailable ask to be transferred the Adult Hospitalist Department. 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 Metabolic encephalopathy  2. Esophageal cancer       PENDING TEST RESULTS:   At the time of discharge the following test results are still pending: none    FOLLOW UP APPOINTMENTS:    Follow-up Information     Follow up With Details Comments [address], [place] In 1 week  [address]ica [address]  [phone]             ADDITIONAL CARE RECOMMENDATIONS:   Follow up with PMD in 1 week    DIET: PEG feeding    ACTIVITY: Activity as tolerated      DISCHARGE MEDICATIONS:  Current Discharge Medication List      START taking these medications    Details   QUEtiapine (SEROQUEL) 50 mg tablet Take 1 Tab by mouth nightly.   Qty: 30 Tab, Refills: 0         CONTINUE these medications which have NOT CHANGED    Details   sucralfate (CARAFATE) 100 mg/mL suspension Take 10 mL by mouth four (4) times daily as needed. Qty: 500 mL, Refills: 0      ALLERGY RELIEF,CHLORPHENIRAMN, 4 mg tablet TAKE 1 TABLET BY MOUTH TWICE A DAY AS NEEDED FOR ALLERGIES OR RHINITIS  Refills: 1    Associated Diagnoses: GE junction carcinoma ([facility] [address] Dysphasia; Physical deconditioning      fluticasone (FLONASE) 50 mcg/actuation nasal spray 1 [place] by Both Nostrils route two (2) times a day. Associated Diagnoses: GE junction carcinoma ([address] Dysphasia; Physical deconditioning      prochlorperazine (COMPAZINE) 10 mg tablet TAKE 1 TABLET BY MOUTH 4 TIMES A DAY FOR 8 WEEKS AS NEEDED  Refills: 3    Associated Diagnoses: GE junction carcinoma ([facility] [address] Dysphasia; Physical deconditioning      ondansetron hcl (ZOFRAN) 8 mg tablet Take 1 Tab by mouth every eight (8) hours as needed for Nausea. Qty: 30 Tab, Refills: 5      pantoprazole (PROTONIX) 40 mg tablet Take 1 Tab by mouth daily. Gastric cancer, hx bleeding  Qty: 30 Tab, Refills: 0      traMADol (ULTRAM) 50 mg tablet Take 50 mg by mouth every eight (8) hours as needed for Pain. aspirin delayed-release 81 mg tablet Take 81 mg by mouth daily. finasteride (PROSCAR) 5 mg tablet Take 5 mg by mouth daily. tamsulosin (FLOMAX) 0.4 mg capsule Take 0.8 mg by mouth daily. gabapentin (NEURONTIN) 800 mg tablet Take  by mouth three (3) times daily. STOP taking these medications       Nut. Tx,Spec. Frm,L-fr,Iron-FOS (TWOCAL HN) 0.08-2 gram-kcal/mL liqd Comments:   Reason for Stopping:         lisinopril (PRINIVIL, ZESTRIL) 5 mg tablet Comments:   Reason for Stopping:         dexamethasone (DECADRON) 4 mg tablet Comments:   Reason for Stopping:         LORazepam (ATIVAN) 0.5 mg tablet Comments:   Reason for Stopping:         L. acidoph & paracasei- S therm- Bifido (ROMI-Q/RISAQUAD) 8 billion cell cap cap Comments:   Reason for Stopping:         calcium-cholecalciferol, d3, (CALCIUM 600 + D) 600-125 mg-unit tab Comments:   Reason for Stopping:         cyanocobalamin (VITAMIN B-12) 1,000 mcg tablet Comments:   Reason for Stopping:         raNITIdine (ZANTAC) 150 mg tablet Comments:   Reason for Stopping:         loratadine (CLARITIN) 10 mg tablet Comments:   Reason for Stopping:                 NOTIFY YOUR PHYSICIAN FOR ANY OF THE FOLLOWING:   Fever over 101 degrees for 24 hours. Chest pain, shortness of breath, fever, chills, nausea, vomiting, diarrhea, change in mentation, falling, weakness, bleeding. Severe pain or pain not relieved by medications. Or, any other signs or symptoms that you may have questions about.     DISPOSITION:  x  Home With:   OT  PT  HH  RN       Long term SNF/Inpatient Rehab    Independent/assisted living   x Hospice    Other:       PATIENT CONDITION AT DISCHARGE:     Functional status   x Poor     Deconditioned     Independent      Cognition     Lucid     Forgetful    x Dementia      Catheters/lines (plus indication)    Frye     PICC    x PEG     None      Code status     Full code    x DNR      PHYSICAL EXAMINATION AT DISCHARGE:  Please see progress note      CHRONIC MEDICAL DIAGNOSES:  Problem List as of 9/7/2017  Date Reviewed: 8/30/2017          Codes Class Noted - Resolved    Chemotherapy-induced nausea ICD-10-CM: R11.0, T45.1X5A  ICD-9-CM: 787.02, E933.1  8/30/2017 - Present        * (Principal)Altered mental status ICD-10-CM: R41.82  ICD-9-CM: 780.97  8/30/2017 - Present        Physical deconditioning ICD-10-CM: R53.81  ICD-9-CM: 799.3  8/17/2017 - Present        Dysphasia ICD-10-CM: R47.02  ICD-9-CM: 784.59  8/5/2017 - Present        Esophageal dysphagia ICD-10-CM: R13.14  ICD-9-CM: 787.24  7/31/2017 - Present        Pain, cancer ICD-10-CM: G89.3  ICD-9-CM: 338.3  7/31/2017 - Present        GE junction carcinoma ([facility] [address] ICD-10-CM: C16.0  ICD-9-CM: 151.0  7/19/2017 - Present        Gastroesophageal reflux disease without esophagitis ICD-10-CM: K21.9  ICD-9-CM: 530.81  6/13/2017 - Present        DNR (do not resuscitate) ICD-10-CM: Z66  ICD-9-CM: V49.86  4/5/2017 - Present        Decreased hearing of both ears ICD-10-CM: H91.93  ICD-9-CM: 389.9  4/5/2017 - Present        Encounter for Hemoccult screening ICD-10-CM: Z12.11  ICD-9-CM: V76.51  4/5/2017 - Present        Rash of entire body ICD-10-CM: R21  ICD-9-CM: 782.1  9/15/2016 - Present        Chronic constipation ICD-10-CM: K59.09  ICD-9-CM: 564.00  6/11/2014 - Present        Hip pain, left ICD-10-CM: M25.552  ICD-9-CM: 719.45  4/11/2014 - Present        Back pain, chronic ICD-10-CM: M54.9, G89.29  ICD-9-CM: 724.5, 338.29  4/11/2014 - Present        Need for varicella vaccine ICD-10-CM: Z23  ICD-9-CM: V05.4  4/11/2014 - Present        Tingling sensation ICD-10-CM: R20.2  ICD-9-CM: 782.0  5/9/2013 - Present        Acute bronchitis ICD-10-CM: J20.9  ICD-9-CM: 466.0  4/5/2013 - Present        Special screening for osteoporosis ICD-10-CM: Z13.820  ICD-9-CM: V82.81  4/5/2013 - Present        Insomnia ICD-10-CM: G47.00  ICD-9-CM: 780.52  8/1/2012 - Present        Kidney function abnormal ICD-10-CM: N28.9  ICD-9-CM: 593.9  8/1/2012 - Present        Elevated PSA measurement ICD-10-CM: R97.20  ICD-9-CM: 790.93  8/1/2012 - Present        B12 deficiency ICD-10-CM: E53.8  ICD-9-CM: 266.2  8/1/2012 - Present        Vitamin D deficiency ICD-10-CM: E55.9  ICD-9-CM: 268.9  8/1/2012 - Present        AR (allergic rhinitis) ICD-10-CM: J30.9  ICD-9-CM: 477.9  8/1/2012 - Present        Hypercholesterolemia ICD-10-CM: E78.00  ICD-9-CM: 272.0  7/11/2012 - Present        Urinary frequency ICD-10-CM: R35.0  ICD-9-CM: 788.41  7/11/2012 - Present        Left knee DJD ICD-10-CM: M17.12  ICD-9-CM: 715.96  12/13/2011 - Present    Overview Signed 12/13/2011  4:27 PM by January Orr PA-C     Scheduled for LEFT TKR on 12-             HTN (hypertension) ICD-10-CM: I10  ICD-9-CM: 401.9  12/13/2011 - Present        RESOLVED: Altered mental state ICD-10-CM: R41.82  ICD-9-CM: 780.97  8/5/2017 - 8/11/2017        RESOLVED: Fall ICD-10-CM: W19. Pravin Seal  ICD-9-CM: P247.8  8/5/2017 - 8/11/2017              Greater than 37 minutes were spent with the patient on counseling and coordination of care    Signed:   Jeanette Mcneil MD  9/7/2017  8:38 AM

## 2017-09-07 NOTE — PROGRESS NOTES
Note recorded on 9/7/17:    [facility] Partner Volunteer visited patient in [address] on 9/6/17. Documented by:  Adolph Crabtree M.Div.   [facility] Service [phone])

## 2017-09-07 NOTE — PROGRESS NOTES
Spiritual Care Partner Volunteer visited patient in [address] on 9/7/17. Documented by:  Jermain Mobley M.Div.    Paging Service [phone])

## 2017-10-05 NOTE — PROGRESS NOTES
1800: Received pt via [facility] into room 8 for 5 day respite stay. DX: Adeno Carcinoma GE junction. Pt has a Right chest port which is not accessed. Has left peg placed. Admission orders and assessment done by Marilu Layton and Shannan Salgado. 1830:Pt accepted small sips of water without difficulty.

## 2017-10-06 NOTE — PROGRESS NOTES
0700 Received report from Alex Wilkins Nu  Patient was admitted to the [facility]  10-5   Level of care-Respite  Hospice Diagnosis-Adenocarcinoma of the GE junction  Other History includes-HTN, Insomnia, Acute bronchitis, chronic constipation, rash    0800 CNA reported elevated 144/92 will medicated with pain medication. Am meds crushed to give, PEG is clogged with 3 inches of old meds in line, will irrigate and dwell gingerale for one hr before giving meds. 0830 Assessment completed  Neuro- alert x 1, speech soft, drowsy  Muscular-moves all extremities, weak  Resp-lungs clear, diminished in bases, oxygen saturations 99%  Cardio-Heart regular at 82, pulses palpable, skin warm and dry, cap refill less than 3 sec, /92  GI- Abd soft nondistended,  bowel sounds hypoactive, incontinent of bowels, last BM 10-2, pt is NPO, PEG in place clogged this am with meds will try to unclog this am  -incontinent of bladder  Skin/Wounds - sacral stage one barrier applied  Edema-none  Access sites-none  Family-Dtr Dickson Mohs    0930 Gingerale dwell time released PEG enough to get am meds in, still sluggish. 1130 Coke instilled trough PEG will allow dwell time. 1330 MD rounding, pt is resting quietly. Answered that he was ok. 1500 New scop patch applied  1600 Resting quietly, PEG flushed again using coffee let dwell, still partially clogged. 1800 Dayshift Summary  Response to current medicine regimen, activity and diet -lethargic, will respond to voice if loud, takes sips of water, PEG tube still partially blocked, asked NP if we could dc the flomax which is what is causing the clog, she stated no  Family updated on status-Dtr Dickson Mohs called updated  PRNs needed this shift -none    1900 Report to oncoming shift.   NAME OF PATIENT:  Bekah Chau Sr.     LEVEL OF CARE:  Respite     REASON FOR GIP:  Patient is not in GIP care at this time.       REASON FOR RESPITE:  Caregiver breakdown     O2 SAFETY:  Patient is on room air.       FALL INTERVENTIONS PROVIDED:   Implemented/recommended use of non-skid footwear, Implemented/recommended use of fall risk identification flag to all team members, Implemented/recommended assistive devices and encouraged their use, Implemented/recommended resources for alarm system (personal alarm, bed alarm, call bell, etc.) , Implemented/recommended environmental changes (remove hazards, lower bed, improve lighting, etc.) and Implemented/recommended increased supervision/assistance     INTERDISPLINARY COMMUNICATION/COLLABORATION:  Physician, MSW, [place] and RN, CNA     NEW MEDICATION INITIATION DOCUMENTATION:  No new medications nor interventions begun on this night shift.       Reason medication is being initiated:  N/A     MD / Provider name consulted re: change in status / initiation of new medication:  N/A     New Symptom(s):  N/A     New Order(s):  N/A     Name of the person notified of the changes:  N/A     Name of person being taught:  N/A     Instructions given:  N/A     Side Effects taught:  N/A     Response to teaching:  N/A     COMFORTABLE DYING MEASURE:  Monitor for pain, dyspnea, agitation, and restlessness and intervene accordingly.       Is Patient/family satisfied with symptom level?  yes     DISCHARGE PLAN:  Patient to be discharged with family under the continued care of [facility] once his Respite stay is complete.

## 2017-10-06 NOTE — H&P
[address] [facility] Help to Those in Need  [phone]    Patient Name: Sulaiman Romano YOB: 1931    Date of Provider Hospice Visit: 10/06/17    Level of Care:   [] General Inpatient (GIP)    [] Routine   [x] Respite    Location of Care:  [] [facility] [place] [] [facility] [] [address] [] [facility] [x] Hospice [facility]    Date of Original Hospice Admission: 9/7/17  Hospice Medical Director for Inpatient Care: dr Skyla Abdi Diagnosis: Adenocarcinoma of gastroesophageal junction ([facility] [address]        HOSPICE SUMMARY   Sulaiman Romano is a 80y.o. year old who was admitted to [facility]. At the [facility] for respite care due to caregiver breakdown.     The patient's principle diagnosis of esophageal cancer has resulted in further progression of disease leading to hospital admission recently with symptoms of altered mental state, dizziness and dysphagia. Pt underwent esophageal stent, poracath, and ultimately a PEG tube placement. Pt was receiving chemotherapy recently and then developed confusion, nausea and dehydration with abnormal labs of elevated BUN 22, creatinine 1.50. Pt received IVF in the hospital but did not really improve. Pt continues with symptoms of nausea,weakness, confusion and related debility        Functionally, the patient's Karnofsky and/or Palliative Performance Scale has declined over a period of weeks and is estimated at 20% which is a 10% drop since admission. The patient is dependent on the following ADLs:all ADL's     Objective information that support this patients limited prognosis includes: hospital reports, weight loss 25lbs [facility] edx, Left MAC; 27cms       The patient/family chose comfort measures with the support of Hospice. HOSPICE DIAGNOSES   Active Symptoms:  1. Anxiety  2. Weakness  3. Pain, chest, abdominal       PLAN   1. Comfort care  2. Home hospice meds    3.  and SW to support family needs  4.  Disposition: home after respite    Prognosis estimated based on 10/06/17 clinical assessment is:   [] Hours to Days    [x] Days to Weeks    [] Other:    Communicated plan of care with: Hospice Case Manager; Hospice IDT; Care Team     GOALS OF CARE     Resuscitation Status: DNR  Durable DNR: [x] Yes [] No    Advance Care Planning 8/31/2017   Patient's Healthcare Decision Maker is: Legal Next of Kin   Primary Decision Maker Name -----   Primary Decision Maker Phone Number -[phone]   Primary Decision Maker Relationship to Patient Adult child   Secondary Decision Maker Name -   Secondary Decision [address] Phone Number -   Secondary Decision Maker Relationship to Patient -   Confirm Advance Directive Yes, on file   Does the patient have other document types Do Not Resuscitate; Power of         HISTORY     History obtained from: chart, SN    CHIEF COMPLAINT: pt with weakness and debility  The patient is:   [x] Verbal  [] Nonverbal  [] Unresponsive    HPI/SUBJECTIVE:  80year old male with adenocarcinoma of gastroesophageal junction     REVIEW OF SYSTEMS     The following systems were: [x] reviewed  [] unable to be reviewed    Positive ROS include:  Constitutional: fatigue, weakness, in pain   Ears/nose/mouth/throat:    Respiratory:   Gastrointestinal: poor appetite, abdominal pain   Musculoskeletal:    Neurologic:confusion, weakness  Psychiatric:anxiety, feeling depressed, poor sleep  Endocrine:     Adult Non-Verbal Pain Assessment Score: N/A    Face  [] 0   No particular expression or smile  [] 1   Occasional grimace, tearing, frowning, wrinkled forehead  [] 2   Frequent grimace, tearing, frowning, wrinkled forehead    Activity (movement)  [] 0   Lying quietly, normal position  [] 1   Seeking attention through movement or slow, cautious movement  [] 2   Restless, excessive activity and/or withdrawal reflexes    Guarding  [] 0   Lying quietly, no positioning of hands over areas of body  [] 1   Splinting areas of the body, tense  [] 2 Rigid, stiff    Physiology (vital signs)  [] 0   Stable vital signs  [] 1   Change in any of the following: SBP > 20mm Hg; HR > 20/minute  [] 2   Change in any of the following: SBP > 30mm Hg; HR > 25/minute    Respiratory  [] 0   Baseline RR/SpO2, compliant with ventilator  [] 1   RR > 10 above baseline, or 5% drop SpO2, mild asynchrony with ventilator  [] 2   RR > 20 above baseline, or 10% drop SpO2, asynchrony with ventilator     FUNCTIONAL ASSESSMENT     Palliative Performance Scale (PPS): 20%     PSYCHOSOCIAL/SPIRITUAL ASSESSMENT     Active Problems:    * No active hospital problems. *    Past Medical History:   Diagnosis Date    Acid reflux     Arthritis     right  left knees    Back pain, chronic 4/11/2014    Cancer ([facility] [address] 2017    esophegeal    Decreased hearing of both ears 4/5/2017    DNR (do not resuscitate) 4/5/2017    Gastroesophageal reflux disease without esophagitis 6/13/2017    GERD (gastroesophageal reflux disease)     Hip pain, left 4/11/2014    Hypercholesterolemia     Hypertension     Need for varicella vaccine 4/11/2014    Physical deconditioning 8/17/2017    Rash of entire body 9/15/2016    Restless leg syndrome       Past Surgical History:   Procedure Laterality Date    ABDOMEN SURGERY PROC UNLISTED  1980's    gall bladder    ABDOMEN SURGERY PROC UNLISTED  15 yrs ago    right hernia repair    HX HEENT      EYE SURGERY 40 YRS AGO.     HX ORTHOPAEDIC      fluid removed both knees    HX ORTHOPAEDIC  2010    bilateral knee replacements      Social History   Substance Use Topics    Smoking status: Former Smoker     Years: 0.00     Quit date: 4/28/1970    Smokeless tobacco: Never Used    Alcohol use Yes      Comment: OCCASSIONALLY     Family History   Problem Relation Age of Onset    Heart Disease Sister       Allergies   Allergen Reactions    Lorazepam Other (comments)     per daughter made pt \"loopy\"     Dexamethasone Other (comments)     Per daughter made pt \"loopy\" when given with chemo    Prochlorperazine Other (comments)     Per daughter made pt confused      Current Facility-Administered Medications   Medication Dose Route Frequency    sennosides (SENOKOT) 8.8 mg/5 mL syrup 8.8 mg  5 mL Oral BID PRN    QUEtiapine (SEROquel) tablet 50 mg  50 mg Per J Tube QHS    scopolamine (TRANSDERM-SCOP) 1.5 mg  1.5 mg TransDERmal Q72H    tamsulosin (FLOMAX) capsule 0.4 mg  0.4 mg Oral BID    pantoprazole (PROTONIX) granules for oral suspension 40 mg  40 mg Per G Tube ACB    finasteride (PROSCAR) tablet 5 mg  5 mg Oral DAILY    haloperidol (HALDOL) 2 mg/mL oral solution 2 mg  2 mg Oral Q6H PRN    traMADol (ULTRAM) tablet 50 mg  50 mg Per G Tube Q8H PRN    haloperidol (HALDOL) 2 mg/mL oral solution 0.5 mg  0.5 mg Oral Q6H PRN        PHYSICAL EXAM     Wt Readings from Last 3 Encounters:   09/07/17 75.8 kg (167 lb)   09/07/17 75.4 kg (166 lb 3.6 oz)   08/30/17 82 kg (180 lb 12.8 oz)       Visit Vitals    BP (!) 144/92 (BP 1 Location: Left leg)    Pulse 82    Temp 97.6 °F (36.4 °C)    Resp 16    Ht 6' 1\" (1.854 m)    SpO2 99%       Supplemental O2  [] Yes  [x] NO  Last bowel movement:     Currently this patient has:  [] Peripheral IV [] PICC  [] PORT [] ICD    [] Frye Catheter [] NG Tube   [] PEG Tube    [] Rectal Tube [] Drain  [] Other:     Constitutional: awake and alert  Eyes: cl  ENMT: cl  Cardiovascular: HRRR  Respiratory: decreased  Gastrointestinal: soft tender  Musculoskeletal: weakness  Skin: warm, pale  Neurologic:follows commands  Psychiatric: calm  Other:       Pertinent Lab and or Imaging Tests:  Lab Results   Component Value Date/Time    Sodium 135 09/01/2017 06:34 AM    Potassium 4.4 09/01/2017 06:34 AM    Chloride 104 09/01/2017 06:34 AM    CO2 23 09/01/2017 06:34 AM    Anion gap 8 09/01/2017 06:34 AM    Glucose 100 09/01/2017 06:34 AM    BUN 19 09/01/2017 06:34 AM    Creatinine 1.17 09/01/2017 06:34 AM    BUN/Creatinine ratio 16 09/01/2017 06:34 AM    GFR est AA >60 09/01/2017 06:34 AM    GFR est non-AA 59 09/01/2017 06:34 AM    Calcium 8.9 09/01/2017 06:34 AM     Lab Results   Component Value Date/Time    Protein, total 6.6 08/30/2017 10:40 PM    Albumin 3.0 08/30/2017 10:40 PM           Total time: 45  Counseling / coordination time: [facility],

## 2017-10-06 NOTE — PROGRESS NOTES
Problem: Falls - Risk of  Goal: *Absence of Falls  Document Billie Fall Risk and appropriate interventions in the flowsheet.    Outcome: Progressing Towards Goal  Fall Risk Interventions:              Medication Interventions: Bed/chair exit alarm     Elimination Interventions: Bed/chair exit alarm     History of Falls Interventions: Door open when patient unattended

## 2017-10-06 NOTE — PROGRESS NOTES
Verbal shift change report given to Zaria Jackson, RN by Ariana Romo, RN. Report included the following information SBAR, Kardex, Intake/Output and MAR. NAME OF PATIENT:  Princess Lopez Sr. LEVEL OF CARE:  Respite    REASON FOR GIP:  Patient is not in GIP care at this time. REASON FOR RESPITE:  Caregiver breakdown    O2 SAFETY:  Patient is on room air. FALL INTERVENTIONS PROVIDED:   Implemented/recommended use of non-skid footwear, Implemented/recommended use of fall risk identification flag to all team members, Implemented/recommended assistive devices and encouraged their use, Implemented/recommended resources for alarm system (personal alarm, bed alarm, call bell, etc.) , Implemented/recommended environmental changes (remove hazards, lower bed, improve lighting, etc.) and Implemented/recommended increased supervision/assistance    INTERDISPLINARY COMMUNICATION/COLLABORATION:  Physician, MSW, [place] and RN, CNA    NEW MEDICATION INITIATION DOCUMENTATION:  No new medications nor interventions begun on this night shift. Reason medication is being initiated:  N/A    MD / Provider name consulted re: change in status / initiation of new medication:  N/A    New Symptom(s):  N/A    New Order(s):  N/A    Name of the person notified of the changes:  N/A    Name of person being taught:  N/A    Instructions given:  N/A    Side Effects taught:  N/A    Response to teaching:  N/A    COMFORTABLE DYING MEASURE:  Monitor for pain, dyspnea, agitation, and restlessness and intervene accordingly. Is Patient/family satisfied with symptom level?  yes    DISCHARGE PLAN:  Patient to be discharged with family under the continued care of [facility] once his Respite stay is complete.

## 2017-10-07 NOTE — PROGRESS NOTES
Problem: Falls - Risk of  Goal: *Absence of Falls  Document Billie Fall Risk and appropriate interventions in the flowsheet.    Outcome: Progressing Towards Goal  Fall Risk Interventions:  Mobility Interventions: Bed/chair exit alarm, Patient to call before getting OOB     Mentation Interventions: Bed/chair exit alarm, Door open when patient unattended, More frequent rounding, Reorient patient, Room close to nurse's station     Medication Interventions: Bed/chair exit alarm, Evaluate medications/consider consulting pharmacy, Patient to call before getting OOB     Elimination Interventions: Bed/chair exit alarm, Call light in reach, Patient to call for help with toileting needs, Toileting schedule/hourly rounds     History of Falls Interventions: Bed/chair exit alarm, Door open when patient unattended

## 2017-10-07 NOTE — PROGRESS NOTES
0700:Verbal shift change report given to SONIA Sánchez (oncoming nurse) by MamiRN (offgoing nurse). Report included the following information SBAR, Kardex, Intake/Output and MAR.   2312: Peg tube still sluggish but functional.  0920: Becoming restless and beating on the side rail of bed, trying to get out of bed. Medicated with haldol and roxanol. 1000: Resting calmly at this time. 1125: Pt awake and raised the bed to the fullest height. Bed lowered and nursing locked the high/low button in the lowest position. 1245: Incontinant of large amount urine. Paz care given and attends changed. Repositioned for comfort. 1635: Family in to visit. Had questions re:A shyla visit. (the family pet). Per family, Surya Fernandez has had all her shots. Shyla-cat will visit tomorrow. NAME OF PATIENT:  Mika Feliz Sr.     LEVEL OF CARE:  Respite    REASON FOR GIP: NA    *PATIENT REMAINS ELIGIBLE FOR GIP LEVEL OF CARE AS EVIDENCED BY: (MUST BE ADDRESSED OF PATIENT GIP)    REASON FOR RESPITE:  Caregiver breakdown    O2 SAFETY:NA    FALL INTERVENTIONS PROVIDED:   Implemented/recommended use of non-skid footwear, Implemented/recommended use of fall risk identification flag to all team members, Implemented/recommended assistive devices and encouraged their use, Implemented/recommended resources for alarm system (personal alarm, bed alarm, call bell, etc.) , Implemented/recommended environmental changes (remove hazards, lower bed, improve lighting, etc.) and Implemented/recommended increased supervision/assistance    INTERDISPLINARY COMMUNICATION/COLLABORATION:  Physician, MSW, [place] and RN, CNA    NEW MEDICATION INITIATION DOCUMENTATION:NA      Reason medication is being initiated:  NA    MD / Provider name consulted re: change in status / initiation of new medication:  NA    New Symptom(s):  NA    New Order(s):  NA    Name of the person notified of the changes:NA    Name of person being taught: NA    Instructions given: NA    Side Effects taught:  NA    Response to teaching:  NA      COMFORTABLE DYING MEASURE:  Is Patient/family satisfied with symptom level?  yes    DISCHARGE PLAN:  Home with family followed by [employer].

## 2017-10-07 NOTE — PROGRESS NOTES
Bedside and Verbal shift change report given to Shantelle Urbano 694 (oncoming nurse) by Jossue Keller RN (offgoing nurse). Report included the following information SBAR, Kardex, Intake/Output and MAR. NAME OF PATIENT:  Stoney Hamman Sr. LEVEL OF CARE:  RESPITE    REASON FOR GIP:   NA    *PATIENT REMAINS ELIGIBLE FOR GIP LEVEL OF CARE AS EVIDENCED BY: (MUST BE ADDRESSED OF PATIENT GIP)      REASON FOR RESPITE:  Caregiver breakdown    O2 SAFETY:  NA    FALL INTERVENTIONS PROVIDED:   Implemented/recommended use of non-skid footwear, Implemented/recommended use of fall risk identification flag to all team members, Implemented/recommended assistive devices and encouraged their use, Implemented/recommended resources for alarm system (personal alarm, bed alarm, call bell, etc.) , Implemented/recommended environmental changes (remove hazards, lower bed, improve lighting, etc.) and Implemented/recommended increased supervision/assistance    INTERDISPLINARY COMMUNICATION/COLLABORATION:  Physician, MSW, [place] and RN, CNA    NEW MEDICATION INITIATION DOCUMENTATION:  NA    Reason medication is being initiated:  SHANTELLE    MD / Provider name consulted re: change in status / initiation of new medication:  NA    New Symptom(s):  NA    New Order(s):  NA    Name of the person notified of the changes:  NA    Name of person being taught:  NA    Instructions given:  NA    Side Effects taught:  NA    Response to teaching:  NA      COMFORTABLE DYING MEASURE:  Is Patient/family satisfied with symptom level?  yes    DISCHARGE PLAN:  Return back home with family after Respite stay is complete followed by Hospice.

## 2017-10-08 NOTE — HOSPICE
NAME OF PATIENT:  Mika Feliz Sr. LEVEL OF CARE:  Respite    REASON FOR GIP:       *PATIENT REMAINS ELIGIBLE FOR GIP LEVEL OF CARE AS EVIDENCED BY: (MUST BE ADDRESSED OF PATIENT GIP)      REASON FOR RESPITE:  Caregiver breakdown    O2 SAFETY:      FALL INTERVENTIONS PROVIDED:   Implemented/recommended use of non-skid footwear, Implemented/recommended use of fall risk identification flag to all team members, Implemented/recommended assistive devices and encouraged their use, Implemented/recommended resources for alarm system (personal alarm, bed alarm, call bell, etc.)  and Implemented/recommended environmental changes (remove hazards, lower bed, improve lighting, etc.)    INTERDISPLINARY COMMUNICATION/COLLABORATION:  Physician, MSW, Ana and RN, CNA    NEW MEDICATION INITIATION DOCUMENTATION:      Reason medication is being initiated:      MD / Provider name consulted re: change in status / initiation of new medication:      New Symptom(s):      New Order(s):      Name of the person notified of the changes:      Name of person being taught:      Instructions given:      Side Effects taught:      Response to teachin [address] free  Is Patient/family satisfied with symptom level?  yes    DISCHARGE PLAN:  Home with family and be followed by home hospice    19:30,report received,assumed care of pt. Pt responds to name called. He is oriented to self only and voice is very soft,with slow speech. Pt is incontinent with a brief on. PEG tube clamped,pt turned and repositioned for comfort. 22:22,diaper wet,changed and repositioned. PEG tube was clogged,I tried to flush it with Pepsi,I was able to get his crushed Seroquel down . 02:00,asleep,turned and repositioned,brief changed. 04:30,sleeping,no changes. 06:00,pt bathed,no changes.

## 2017-10-08 NOTE — PROGRESS NOTES
Problem: Falls - Risk of  Goal: *Absence of Falls  Document Billie Fall Risk and appropriate interventions in the flowsheet.    Outcome: Progressing Towards Goal  Fall Risk Interventions:  Mobility Interventions: Bed/chair exit alarm     Mentation Interventions: Bed/chair exit alarm, Door open when patient unattended     Medication Interventions: Bed/chair exit alarm, Evaluate medications/consider consulting pharmacy, Patient to call before getting OOB     Elimination Interventions: Bed/chair exit alarm, Toileting schedule/hourly rounds     History of Falls Interventions: Bed/chair exit alarm, Door open when patient unattended, Evaluate medications/consider consulting pharmacy, Room close to nurse's station

## 2017-10-09 NOTE — PROGRESS NOTES
0700:Report received from RN, pt is lethargic responds to voice , confusion noted repositioned for comfort. 0900:Pt resting quietly, mouth care done. 11:38:Adm Haldol for mild agitation SL tolerated well. 1200:Meds effective, pt resting quietly. 1400:No changes, mouth care done. 1600:Repositioned for comfort, mouth care done. 1800:Pt resting quietly, mouth care done. 1900:Report given to RN. NAME OF PATIENT:  Karin Raimrez Sr. LEVEL OF CARE:  Respite  REASON FOR GIP: N/A      *PATIENT REMAINS ELIGIBLE FOR GIP LEVEL OF CARE AS EVIDENCED BY: (MUST BE ADDRESSED OF PATIENT GIP)      REASON FOR RESPITE:  Caregiver breakdown    O2 SAFETY:N/A      FALL INTERVENTIONS PROVIDED:   Implemented/recommended use of fall risk identification flag to all team members, Implemented/recommended assistive devices and encouraged their use, Implemented/recommended resources for alarm system (personal alarm, bed alarm, call bell, etc.)  and Implemented/recommended environmental changes (remove hazards, lower bed, improve lighting, etc.)    INTERDISPLINARY COMMUNICATION/COLLABORATION:  Physician, MSW, Ana and RN, CNA    NEW MEDICATION INITIATION DOCUMENTATION:N/A      Reason medication is being initiated:  N/A    MD / Provider name consulted re: change in status / initiation of new medication:  N/A    New Symptom(s):  N/A    New Order(s):  N/A    Name of the person notified of the changes:  N/A    Name of person being taught:  N/A    Instructions given:  N/A    Side Effects taught:  N/A    Response to teaching:  N/A      COMFORTABLE DYING MEASURE:  Is Patient/family satisfied with symptom level?  yes    DISCHARGE PLAN:  Pt will go home with family after respite stay.

## 2017-10-09 NOTE — HOSPICE
NAME OF PATIENT:  Rina Guevara Sr. LEVEL OF CARE: respite    REASON FOR GIP:       *PATIENT REMAINS ELIGIBLE FOR GIP LEVEL OF CARE AS EVIDENCED BY: (MUST BE ADDRESSED OF PATIENT GIP)      REASON FOR RESPITE:  Caregiver breakdown    O2 SAFETY:      FALL INTERVENTIONS PROVIDED:   Implemented/recommended use of non-skid footwear, Implemented/recommended use of fall risk identification flag to all team members, Implemented/recommended assistive devices and encouraged their use, Implemented/recommended resources for alarm system (personal alarm, bed alarm, call bell, etc.)  and Implemented/recommended environmental changes (remove hazards, lower bed, improve lighting, etc.)    INTERDISPLINARY COMMUNICATION/COLLABORATION:  Physician, MSW, [place] and RN, CNA    NEW MEDICATION INITIATION DOCUMENTATION:      Reason medication is being initiated:      MD / Provider name consulted re: change in status / initiation of new medication:      New Symptom(s):      New Order(s):      Name of the person notified of the changes:      Name of person being taught:      Instructions given:      Side Effects taught:      Response to teachin [address] free  Is Patient/family satisfied with symptom level?  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 Pt is asleep,easily responds to verbal stimuli,confused. Peg is clamped,pt is incontinent with a brief on. Pt turned and repositioned for comfort. 00:00,turned and repositioned,no changes. Brief changed. Daughter Gabe Morton called and was updated. 03:30,awake,restless,banging hand on siderail,medicated with Haldol. 05:00,still moving about in bed. 05:45,brief changed,repositioned.

## 2017-10-10 NOTE — PROGRESS NOTES
Verbal shift change report given to [employer] by Ernesto Mcclelland LPN.  Report included the following information SBAR, Kardex, Intake/Output and MAR.       NAME OF PATIENT:  Bandar FuentesSr.      LEVEL OF CARE:  Respite      REASON FOR GIP:   Patient not in GIP care at this time.      REASON FOR RESPITE:  Caregiver breakdown.      O2 SAFETY:  Patient on Room Air.        FALL INTERVENTIONS PROVIDED:   Implemented/recommended use of non-skid footwear, Implemented/recommended use of fall risk identification flag to all team members, Implemented/recommended assistive devices and encouraged their use, Implemented/recommended resources for alarm system (personal alarm, bed alarm, call bell, etc.) , Implemented/recommended environmental changes (remove hazards, lower bed, improve lighting, etc.) and Implemented/recommended increased supervision/assistance      INTERDISPLINARY COMMUNICATION/COLLABORATION:  Physician, MSW, [place] and RN, CNA      NEW MEDICATION INITIATION DOCUMENTATION:  N/A     Reason medication is being initiated: N/A    MD / Provider name consulted re: change in status / initiation of new medication:  N/A      New Symptom(s):  N/A      New Order(s):  N/A     Name of the person notified of the changes:  N/A      Name of person being taught:  N/A      Instructions given:  N/A      Side Effects taught:  N/A      Response to teaching:  N/A      COMFORTABLE DYING MEASURE:  Monitor for pain, dyspnea, agitation and restlessness and intervene accordingly.        Is Patient/family satisfied with symptom level?  yes      DISCHARGE PLAN:  Patient to be discharged home with family under the continued care of [facility] tomorrow once Respite stay complete.

## 2017-10-10 NOTE — PROGRESS NOTES
1645:  Patient was d/c from [facility] for respite care via [address] [place] [address]. Home medications, DNR, and face sheet was given to [address] [place] [address] workers. Parul Garza will be doing tuck-in visit this evening. Asked Adryan Navarro to check to see if patient has roxanol at home; she stated that she will.

## 2017-10-10 NOTE — PROGRESS NOTES
0700:  Verbal shift change report given to Barron Koroma RN (oncoming nurse) by Garima Norwood RN (offgoing nurse). Report included the following information SBAR, Kardex, Intake/Output and MAR.   1155:  Jailyn Melvin CNA, requested assistance with obtaining BP; was able to obtain 155/100. Patient is rigid/grimacing/retracting arms and pulling on blankets; eyes closed. Asked pt if he was in pain but voice is too weak (only mumbling). Administered prn haldol 0.5mg/SL and roxanol 10mg/SL. Will continue to monitor. 1230:  Reassessed pt; no s/s of distress. 1645:  Patient was d/c from [facility] for respite care via [id number]. Home medications, DNR, and face sheet was given to [employer] workers. Cassie Roberson will be doing tuck-in visit this evening. Asked Belen Valencia to check to see if patient has roxanol at home; she stated that she will. NAME OF PATIENT:  Leon Randolph .     LEVEL OF CARE:  Respite    REASON FOR GIP:   n/a    *PATIENT REMAINS ELIGIBLE FOR GIP LEVEL OF CARE AS EVIDENCED BY: (MUST BE ADDRESSED OF PATIENT GIP)      REASON FOR RESPITE:  Caregiver breakdown    O2 SAFETY:  n/a    FALL INTERVENTIONS PROVIDED:   Implemented/recommended resources for alarm system (personal alarm, bed alarm, call bell, etc.) , Implemented/recommended environmental changes (remove hazards, lower bed, improve lighting, etc.) and Implemented/recommended increased supervision/assistance    INTERDISPLINARY COMMUNICATION/COLLABORATION:  Physician, MSW, [place] and RN, CNA    NEW MEDICATION INITIATION DOCUMENTATION:  n/a    Reason medication is being initiated:  n/a    MD / Provider name consulted re: change in status / initiation of new medication:  n/a    New Symptom(s):  n/a    New Order(s):  n/a    Name of the person notified of the changes:  n/a    Name of person being taught:  n/a    Instructions given:  n/a    Side Effects taught:  n/a    Response to teaching:  n/a      COMFORTABLE DYING MEASURE:  Is Patient/family satisfied with symptom level?  yes    DISCHARGE PLAN:  Patient to be discharged home with family under the continued care of [facility] once Respite stay complete.

## 2017-10-10 NOTE — PROGRESS NOTES
Hospitalist Progress Note  Shanique Glynn NP  Office: [phone]  Phone [phone]     Date of Service:  8/10/2017  NAME:  Jed Saleh Sr.  :  1931  MRN:  [id number]    Admission Summary:   Pt presented to the ED for altered mental status and inability to tolerated solid foods. ~ Five days PTA, an esophageal stent was placed by Dr. Socorro Cruz then ~ 2 days PTA, a port was placed in anticipation for chemo. At the time of port placement, it was noticed that the esophageal stent may have migrated. Plans were made to remove the stent and place a PEG but then the pt started having dizziness and low blood pressure. She contacted his PCP who advised him to stop taking his lisinopril. He then fell and continued to have dizziness and have signs of confusion so called EMS. Interval history / Subjective:   Pt seen this afternoon, daughter present. Pt in bed, no new complaints - VSS. Pt spent time reminiscing about his wife ( ) and his great relationship with his daughter. He is eager to go home - discussed plans with him for discharge tomorrow morning. Assessment & Plan:     Suspected Aspiration during EGD/PEG :  - pts BP and HR elevated with increased oxygen requirement  - bolused in Endo and started on Zosyn   - transferred to tele for increased HR and closer monitoring but then downgraded with resolution of symptoms  - Chest Xray: New bilateral perihilar airspace disease may represent pulmonary  edema or pneumonia.  Small bilateral pleural effusions  - continue with zosyn for now, will change to augmentin on discharge\  - fine crackles in L base today - fluids stopped, will give low dose lasix    Odynophagia,  likely related to Esophageal cancer:  - Dr. Velvet Chavez follows pt for his esophageal cancer - he is to start chemo next week (she is  aware if recent events)  - CXR : esophageal stent migrated into the fundus of the stomach  - 8/8: removal of stent and PEG placed  - TF started and now at goal bolus feeding  - daughter will be performing tube care - she will call [facility] once at home tomorrow for them to come to her home for education. CKD 3: stable    Mild Hyperkalemia: resolved    S/p Fall, PTA: no PT needs on d/c    Hx HTN: continue Lisinopril , BP stable    BPH: Proscar and Flomax    Code status: DNR  DVT prophylaxis: SCDs  Care Plan discussed with: patient and daughter Carlos Manuel Schulz) [phone] (spoke with daughter at bedside today)  Disposition: Home tomorrow. Hospital Problems  Date Reviewed: 8/8/2017          Codes Class Noted POA    Altered mental state ICD-10-CM: R41.82  ICD-9-CM: 780.97  8/5/2017 Yes        * (Principal)Dysphasia ICD-10-CM: R47.02  ICD-9-CM: 784.59  8/5/2017 Yes        Fall ICD-10-CM: S24. Dirk Lawman  ICD-9-CM: W507.1  8/5/2017 Yes            Review of Systems:   Denies HA. No chest pain or pressure. No respiratory or GI complaints. Vital Signs:    Last 24hrs VS reviewed since prior progress note. Most recent are:  Visit Vitals    /68 (BP 1 Location: Left arm, BP Patient Position: At rest)    Pulse 82    Temp 98.1 °F (36.7 °C)    Resp 18    Ht 6' (1.829 m)    Wt 80.7 kg (177 lb 14.6 oz)    SpO2 93%    BMI 24.13 kg/m2       Intake/Output Summary (Last 24 hours) at 08/10/17 1533  Last data filed at 08/10/17 1424   Gross per 24 hour   Intake             1010 ml   Output                0 ml   Net             1010 ml      Physical Examination:         Constitutional:  No acute distress, cooperative, pleasant    ENT:  Oral mucous moist   Resp:  Fine crackles in L base . On RA   CV:  Regular rhythm, regular rate    GI:  Soft, non distended, abdominal binder on, PEG. Normoactive bowel sounds. + BM    Musculoskeletal:  No edema, warm, 2+ pulses throughout    Neurologic:  Moves all extremities. AAOx3.      Skin:  Good turgor, no rashes or ulcers    Data Review:   Review and/or order of clinical lab test  Review and/or order of tests in the radiology section of CPT  Review and/or order of tests in the medicine section of CPT    Labs:     Recent Labs      08/10/17   0638  08/09/17   0350   WBC  16.4*  11.5*   HGB  10.2*  11.5*   HCT  28.8*  32.8*   PLT  161  207     Recent Labs      08/10/17   0638  08/09/17   0350  08/08/17   1615   NA  137  140  141   K  3.6  4.2  4.0   CL  105  107  105   CO2  23  23  25   BUN  31*  30*  15   CREA  1.63*  1.56*  1.41*   GLU  101*  104*  139*   CA  8.5  8.7  9.4     Recent Labs      08/08/17   1615   SGOT  37   ALT  53   AP  146*   TBILI  0.9   TP  6.8   ALB  3.2*   GLOB  3.6     No results for input(s): INR, PTP, APTT in the last 72 hours. No lab exists for component: INREXT, INREXT   No results for input(s): FE, TIBC, PSAT, FERR in the last 72 hours. Lab Results   Component Value Date/Time    Folate 11.2 09/16/2016 11:03 AM      No results for input(s): PH, PCO2, PO2 in the last 72 hours. No results for input(s): CPK, CKNDX, TROIQ in the last 72 hours.     No lab exists for component: CPKMB  Lab Results   Component Value Date/Time    Cholesterol, total 197 04/05/2017 03:43 PM    HDL Cholesterol 40 04/05/2017 03:43 PM    LDL, calculated 154 09/16/2016 11:03 AM    Triglyceride 124 09/16/2016 11:03 AM    CHOL/HDL Ratio 4.5 10/27/2010 07:50 AM     Lab Results   Component Value Date/Time    Glucose (POC) 118 08/02/2017 08:19 AM     Lab Results   Component Value Date/Time    Color YELLOW/STRAW 08/05/2017 06:23 PM    Appearance CLEAR 08/05/2017 06:23 PM    Specific gravity 1.018 08/05/2017 06:23 PM    pH (UA) 6.0 08/05/2017 06:23 PM    Protein NEGATIVE  08/05/2017 06:23 PM    Glucose NEGATIVE  08/05/2017 06:23 PM    Ketone NEGATIVE  08/05/2017 06:23 PM    Bilirubin NEGATIVE  08/05/2017 06:23 PM    Urobilinogen 2.0 08/05/2017 06:23 PM    Nitrites NEGATIVE  08/05/2017 06:23 PM    Leukocyte Esterase NEGATIVE  08/05/2017 06:23 PM    Epithelial cells FEW 06/06/2016 07:42 AM    Bacteria NEGATIVE 06/06/2016 07:42 AM    WBC 0-4 06/06/2016 07:42 AM    RBC 0-5 06/06/2016 07:42 AM     Medications Reviewed:     Current Facility-Administered Medications   Medication Dose Route Frequency    furosemide (LASIX) injection 20 mg  20 mg IntraVENous ONCE    piperacillin-tazobactam (ZOSYN) 3.375 g in 0.9% sodium chloride (MBP/ADV) 100 mL  3.375 g IntraVENous Q8H    haloperidol lactate (HALDOL) injection 1 mg  1 mg IntraVENous Q8H PRN    acetaminophen (TYLENOL) solution 650 mg  650 mg Oral Q4H PRN    ondansetron (ZOFRAN) injection 4 mg  4 mg IntraVENous Q4H PRN    fentaNYL citrate (PF) injection 25 mcg  25 mcg IntraVENous Q4H PRN    finasteride (PROSCAR) tablet 5 mg  5 mg Oral DAILY    gabapentin (NEURONTIN) capsule 800 mg  800 mg Oral TID    pantoprazole (PROTONIX) tablet 40 mg  40 mg Oral BID    polyethylene glycol (MIRALAX) packet 17 g  17 g Oral DAILY    sucralfate (CARAFATE) 100 mg/mL oral suspension 1 g  1 g Oral QID    lisinopril (PRINIVIL, ZESTRIL) tablet 5 mg  5 mg Oral DAILY    tamsulosin (FLOMAX) capsule 0.8 mg  0.8 mg Oral DAILY    cloNIDine HCl (CATAPRES) tablet 0.1 mg  0.1 mg Oral Q6H PRN    sodium chloride (NS) flush 5-10 mL  5-10 mL IntraVENous Q8H    sodium chloride (NS) flush 5-10 mL  5-10 mL IntraVENous PRN   ______________________________________________________________________  EXPECTED LENGTH OF STAY: 2d 14h  ACTUAL LENGTH OF STAY:          [address] Claudette Briggs NP [place]

## 2017-10-11 ENCOUNTER — HOME CARE VISIT (OUTPATIENT)
Dept: HOSPICE | Facility: HOSPICE | Age: 82
End: 2017-10-11
Payer: MEDICARE

## 2017-10-11 PROCEDURE — 0651 HSPC ROUTINE HOME CARE

## 2017-10-12 ENCOUNTER — HOME CARE VISIT (OUTPATIENT)
Dept: HOSPICE | Facility: HOSPICE | Age: 82
End: 2017-10-12
Payer: MEDICARE

## 2017-10-12 PROCEDURE — HOSPICE MEDICATION HC HH HOSPICE MEDICATION

## 2017-10-12 PROCEDURE — 0651 HSPC ROUTINE HOME CARE

## 2017-10-13 ENCOUNTER — HOME CARE VISIT (OUTPATIENT)
Dept: SCHEDULING | Facility: HOME HEALTH | Age: 82
End: 2017-10-13
Payer: MEDICARE

## 2017-10-13 VITALS
RESPIRATION RATE: 20 BRPM | HEART RATE: 105 BPM | SYSTOLIC BLOOD PRESSURE: 86 MMHG | OXYGEN SATURATION: 90 % | DIASTOLIC BLOOD PRESSURE: 42 MMHG

## 2017-10-13 PROCEDURE — HOSPICE MEDICATION HC HH HOSPICE MEDICATION

## 2017-10-13 PROCEDURE — G0156 HHCP-SVS OF AIDE,EA 15 MIN: HCPCS

## 2017-10-13 PROCEDURE — G0300 HHS/HOSPICE OF LPN EA 15 MIN: HCPCS

## 2017-10-13 PROCEDURE — 0651 HSPC ROUTINE HOME CARE

## 2017-10-14 ENCOUNTER — HOME CARE VISIT (OUTPATIENT)
Dept: HOSPICE | Facility: HOSPICE | Age: 82
End: 2017-10-14
Payer: MEDICARE

## 2017-10-14 PROCEDURE — 0651 HSPC ROUTINE HOME CARE

## 2017-10-14 PROCEDURE — G0299 HHS/HOSPICE OF RN EA 15 MIN: HCPCS

## 2017-10-16 ENCOUNTER — HOME CARE VISIT (OUTPATIENT)
Dept: HOSPICE | Facility: HOSPICE | Age: 82
End: 2017-10-16
Payer: MEDICARE

## 2017-10-23 ENCOUNTER — HOME CARE VISIT (OUTPATIENT)
Dept: HOSPICE | Facility: HOSPICE | Age: 82
End: 2017-10-23
Payer: MEDICARE

## 2017-11-01 RX ORDER — LANOLIN ALCOHOL/MO/W.PET/CERES
1000 CREAM (GRAM) TOPICAL DAILY
Qty: 30 TAB | Refills: 6 | OUTPATIENT
Start: 2017-11-01

## 2018-12-29 NOTE — CDMP QUERY
Please clarify if this patient is being treated/managed for:    =>Hyponatremia in the setting of low sodium levels with 1L NS  fl bolus given, continuous infusion started and continued lab monitoring  =>Other Explanation of clinical findings  =>Unable to Determine (no explanation of clinical findings)    The medical record reflects the following:     Clinical Indicators/Risk Factors: Pt admitted for AMS w/dehydration. Sodium levels 131 and 131 consecutively since arrival to ED. Treatment: rec'd NS fl bolus ED w/cont infusion, lab monitoring. Please clarify and document your clinical opinion in the progress notes and discharge summary including the definitive and/or presumptive diagnosis, (suspected or probable), related to the above clinical findings. Please include clinical findings supporting your diagnosis.     Thank you,    Miles Denny, RN, BSN, [facility], [address] [place]  [phone]
Normal rate, regular rhythm.  Heart sounds S1, S2.  No murmurs, rubs or gallops.

## 2019-01-25 NOTE — PROGRESS NOTES
From: Madi Montano  To: Darren Medina DO  Sent: 1/25/2019 8:22 AM CST  Subject: Non-Urgent Medical Question    Hi Dr. Medina. 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 Ryder. I had Mohs surgery performed by Dr. Tammie Murry at the [facility] on Monday. She is confident that the tumor was removed with clean borders. I see her again in two weeks to have the stitches taken out and I see Dr. Ryder again in July to have a full body check. As you know, this type of cancer is very rare. The dermatologist checked to see if it was HPV driven and it was not. He was happy about that as he said if it was additional lesions could occur. In addition to seeing these two doctors, do you need me to make an appointment with you? I hope all is well. Madi Montano   Hospitalist Progress Note  Araceli Garnica MD  Office: [phone]  Cell: [phone]      Date of Service:  2017  NAME:  Kourtney Leary Sr.  :  1931  MRN:  [id number]      Admission Summary:   79 yo male with PMHx of Esophageal Ca with Mets, undergoing Chemo and RTx, HTN, HLD, RLS admitted from home for agitation and confusion. Pt has been taking compazine and ativan for cancer related nausea and vomiting. Pt had similar episode 2 weeks ago as well. Daughter takes care of patient home but stating that it has become burdensome for her due to his memory issues and confusions. 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, with behavioral disturbances  - reorient  - increased Seroquel to 50mg HS  - prn Haldol    BPH   - c/w Proscar and Flomax    Dysphagia due to Esophageal Ca  - mechanical soft diet per speech eval   - Tube feeding    Code status: DNR  DVT prophylaxis: SCDs    Plan: Discharge with hospice today    Care Plan discussed with: Patient/Family and Nurse  Disposition: Hospice when able      Hospital Problems  Date Reviewed: 2017          Codes Class Noted POA    * (Principal)Altered mental status ICD-10-CM: R41.82  ICD-9-CM: 780.97  2017 Unknown                Review of Systems:   A comprehensive review of systems was negative except for that written in the HPI.        Vital Signs:    Last 24hrs VS reviewed since prior progress note. Most recent are:  Visit Vitals    /88 (BP 1 Location: Right arm, BP Patient Position: At rest;Lying left side)    Pulse 76    Temp 98.5 °F (36.9 °C)    Resp 14    Wt 75.4 kg (166 lb 3.6 oz)    SpO2 98%    BMI 22.54 kg/m2         Intake/Output Summary (Last 24 hours) at 09/07/17 8651  Last data filed at 09/07/17 0759   Gross per 24 hour   Intake             1600 ml   Output                0 ml   Net             1600 ml        Physical Examination:             Constitutional:  No acute distress, cooperative, pleasant    ENT:  Oral mucous moist   Resp:  CTA bilaterally. CV:  Regular rhythm, normal rate    GI:  Soft, non distended, non tender. normoactive bowel sounds, PEG in place    Musculoskeletal:  No edema, warm, 2+ pulses throughout    Neurologic:  Moves all extremities. AAOx2     Psych:  paranoid, emotional, poor judgement and insight       Data Review:    Review and/or order of clinical lab test  Review and/or order of tests in the radiology section of CPT  Review and/or order of tests in the medicine section of CPT      Labs:     No results for input(s): WBC, HGB, HCT, PLT, HGBEXT, HCTEXT, PLTEXT, HGBEXT, HCTEXT, PLTEXT in the last 72 hours. No results for input(s): NA, K, CL, CO2, BUN, CREA, GLU, CA, MG, PHOS, URICA in the last 72 hours. No results for input(s): SGOT, GPT, ALT, AP, TBIL, TBILI, TP, ALB, GLOB, GGT, AML, LPSE in the last 72 hours. No lab exists for component: AMYP, HLPSE  No results for input(s): INR, PTP, APTT in the last 72 hours. No lab exists for component: INREXT, INREXT   No results for input(s): FE, TIBC, PSAT, FERR in the last 72 hours. Lab Results   Component Value Date/Time    Folate 11.2 09/16/2016 11:03 AM      No results for input(s): PH, PCO2, PO2 in the last 72 hours. No results for input(s): CPK, CKNDX, TROIQ in the last 72 hours.     No lab exists for component: CPKMB  Lab Results   Component Value Date/Time Cholesterol, total 197 04/05/2017 03:43 PM    HDL Cholesterol 40 04/05/2017 03:43 PM    LDL, calculated 154 09/16/2016 11:03 AM    Triglyceride 124 09/16/2016 11:03 AM    CHOL/HDL Ratio 4.5 10/27/2010 07:50 AM     Lab Results   Component Value Date/Time    Glucose (POC) 108 09/07/2017 07:19 AM    Glucose (POC) 98 09/06/2017 09:09 PM    Glucose (POC) 111 09/06/2017 04:51 PM    Glucose (POC) 115 09/06/2017 11:20 AM    Glucose (POC) 129 09/06/2017 06:29 AM     Lab Results   Component Value Date/Time    Color YELLOW/STRAW 08/31/2017 12:31 PM    Appearance CLEAR 08/31/2017 12:31 PM    Specific gravity 1.010 08/31/2017 12:31 PM    pH (UA) 6.5 08/31/2017 12:31 PM    Protein NEGATIVE  08/31/2017 12:31 PM    Glucose NEGATIVE  08/31/2017 12:31 PM    Ketone NEGATIVE  08/31/2017 12:31 PM    Bilirubin NEGATIVE  08/31/2017 12:31 PM    Urobilinogen 1.0 08/31/2017 12:31 PM    Nitrites NEGATIVE  08/31/2017 12:31 PM    Leukocyte Esterase NEGATIVE  08/31/2017 12:31 PM    Epithelial cells FEW 08/31/2017 12:31 PM    Bacteria NEGATIVE  08/31/2017 12:31 PM    WBC 0-4 08/31/2017 12:31 PM    RBC  08/31/2017 12:31 PM         Medications Reviewed:     Current Facility-Administered Medications   Medication Dose Route Frequency    promethazine (PHENERGAN) tablet 25 mg  25 mg Oral Q6H    QUEtiapine (SEROquel) tablet 50 mg  50 mg Oral QHS    sodium chloride (NS) flush 5-10 mL  5-10 mL IntraVENous Q8H    sodium chloride (NS) flush 5-10 mL  5-10 mL IntraVENous PRN    aspirin delayed-release tablet 81 mg  81 mg Oral DAILY    finasteride (PROSCAR) tablet 5 mg  5 mg Oral DAILY    pantoprazole (PROTONIX) tablet 40 mg  40 mg Oral ACB    tamsulosin (FLOMAX) capsule 0.8 mg  0.8 mg Oral DAILY    traMADol (ULTRAM) tablet 50 mg  50 mg Oral Q8H PRN    sucralfate (CARAFATE) 100 mg/mL oral suspension 1 g  1 g Oral QID PRN    ondansetron (ZOFRAN) injection 4 mg  4 mg IntraVENous Q6H PRN    haloperidol lactate (HALDOL) injection 2 mg  2 mg IntraMUSCular Q6H PRN    glucose chewable tablet 16 g  4 Tab Oral PRN    dextrose (D50W) injection syrg 12.5-25 g  12.5-25 g IntraVENous PRN    glucagon (GLUCAGEN) injection 1 mg  1 mg IntraMUSCular PRN    insulin lispro (HUMALOG) injection   SubCUTAneous AC&HS     ______________________________________________________________________  EXPECTED LENGTH OF STAY: 3d 12h  ACTUAL LENGTH OF STAY:          Lucian Soto MD

## 2023-11-27 NOTE — H&P
[facility].  217 [facility] 140 [facility], [address]  [phone]                                History and Physical     NAME: Mian Mckinney Sr.   :  1931   MRN:  [id number]     HPI:  The patient was seen and examined. Past Surgical History:   Procedure Laterality Date    ABDOMEN SURGERY PROC UNLISTED      gall bladder    ABDOMEN SURGERY PROC UNLISTED  15 yrs ago    right hernia repair    HX HEENT      EYE SURGERY 40 YRS AGO.  HX ORTHOPAEDIC      fluid removed both knees    HX ORTHOPAEDIC      bilateral knee replacements     Past Medical History:   Diagnosis Date    Acid reflux     Arthritis     right  left knees    Back pain, chronic 2014    Decreased hearing of both ears 2017    DNR (do not resuscitate) 2017    Gastroesophageal reflux disease without esophagitis 2017    GERD (gastroesophageal reflux disease)     Hip pain, left 2014    Hypercholesterolemia     Hypertension     Need for varicella vaccine 2014    Rash of entire body 9/15/2016    Restless leg syndrome      Social History   Substance Use Topics    Smoking status: Former Smoker     Years: 0.00     Quit date: 1970    Smokeless tobacco: Never Used    Alcohol use Yes      Comment: OCCASSIONALLY     No Known Allergies  Family History   Problem Relation Age of Onset    Heart Disease Sister      No current facility-administered medications for this encounter. Current Outpatient Prescriptions   Medication Sig    chlorpheniramine (ALLERGY RELIEF,CHLORPHENIRAMN,) 4 mg tablet TAKE 1 TABLET BY MOUTH TWICE A DAY AS NEEDED FOR ALLERGIES OR RHINITIS    pantoprazole (PROTONIX) 40 mg tablet Take 1 Tab by mouth daily. One tab 30min before meal twice daily  Indications: Duodenal Ulcer, gastroesophageal reflux disease, HEARTBURN, Upper GI Bleed    sucralfate (CARAFATE) 100 mg/mL suspension Take 5 mL by mouth four (4) times daily.  Indications: Duodenal Ulcer    hydrocortisone (ANUSOL-HC) 2.5 % rectal cream Insert  into rectum four (4) times daily.  gabapentin (NEURONTIN) 800 mg tablet TAKE 1 TABLET BY MOUTH 3 TIMES A DAY    VITAMIN B-12 1,000 mcg tablet TAKE 1 TABLET BY MOUTH EVERY DAY    polyethylene glycol (MIRALAX) 17 gram/dose powder DISSOLVE 1 SCOOP IN THE WATER ONCE A DAY    calcium-cholecalciferol, D3, (CALTRATE 600+D) tablet Take 1 Tab by mouth two (2) times a day.  tamsulosin (FLOMAX) 0.4 mg capsule TAKE 2 CAPS BY MOUTH DAILY.  loratadine (CLARITIN) 10 mg tablet Take 1 Tab by mouth daily.  lisinopril (PRINIVIL, ZESTRIL) 5 mg tablet TAKE 1 TAB BY MOUTH DAILY.  finasteride (PROSCAR) 5 mg tablet Take 1 Tab by mouth daily. Indications: SYMPTOMATIC BENIGN PROSTATIC HYPERPLASIA    famotidine (PEPCID) 20 mg tablet Take 1 Tab by mouth two (2) times a day.  ondansetron (ZOFRAN ODT) 4 mg disintegrating tablet Take 1 Tab by mouth every eight (8) hours as needed for Nausea.  ranitidine (ZANTAC) 150 mg tablet Take 150 mg by mouth two (2) times a day.  fluticasone (FLONASE) 50 mcg/actuation nasal spray 2 puffs daily  Indications: ALLERGIC CONJUNCTIVITIS, ALLERGIC RHINITIS    aspirin 81 mg tablet Take 1 Tab by mouth daily.  cyanocobalamin 1,000 mcg tablet TAKE 1 TABLET BY MOUTH EVERY DAY    permethrin (ACTICIN) 5 % topical cream apply sparingly as directed, apply sparingly from the forehead hairlines all over the entire body including the soles of the feet and between the toes, leave on for 12 hrs then rinse off, repeat the same in 3 days    psyllium (METAMUCIL) packet Take 1 Packet by mouth two (2) times a day. Hold for Loose bowl Movement    HYDROcodone-ibuprofen (VICOPROFEN) 7.5-200 mg per tablet Take 1 Tab by mouth nightly as needed for Pain.  capsaicin 0.1 % topical cream Apply  to affected area three (3) times daily.  olopatadine (PATANOL) 0.1 % ophthalmic solution Administer 2 Drops to both eyes two (2) times a day. PHYSICAL EXAM:  General: WD, WN. Alert, cooperative, no acute distress    HEENT: NC, Atraumatic. PERRLA, EOMI. Anicteric sclerae. Lungs:  CTA Bilaterally. No Wheezing/Rhonchi/Rales. Heart:  Regular  rhythm,  No murmur, No Rubs, No Gallops  Abdomen: Soft, Non distended, Non tender.  +Bowel sounds, no HSM  Extremities: No c/c/e  Neurologic:  CN 2-12 gi, Alert and oriented X 3. No acute neurological distress   Psych:   Good insight. Not anxious nor agitated. The heart, lungs and mental status were satisfactory for the administration of MAC sedation and for the procedure.       Mallampati score: 3       Assessment:   · Epigastric pain  · Melena    Plan:   · Endoscopic procedure  · MAC sedation   · [phone]

## (undated) DEVICE — STERILE POLYISOPRENE POWDER-FREE SURGICAL GLOVES WITH EMOLLIENT COATING: Brand: PROTEXIS

## (undated) DEVICE — SOLIDIFIER FLUID 3000 CC ABSORB

## (undated) DEVICE — KENDALL RADIOLUCENT FOAM MONITORING ELECTRODE -RECTANGULAR SHAPE: Brand: KENDALL

## (undated) DEVICE — BAG BELONG PT PERS CLEAR HANDL

## (undated) DEVICE — CANN NASAL O2 CAPNOGRAPHY AD -- FILTERLINE

## (undated) DEVICE — SYRINGE MED 20ML STD CLR PLAS LUERLOCK TIP N CTRL DISP

## (undated) DEVICE — SURGICAL PROCEDURE PACK BASIN MAJ SET CUST NO CAUT

## (undated) DEVICE — ENDO CARRY-ON PROCEDURE KIT INCLUDES ENZYMATIC SPONGE, GAUZE, BIOHAZARD LABEL, TRAY, LUBRICANT, DIRTY SCOPE LABEL, WATER LABEL, TRAY, DRAWSTRING PAD, AND DEFENDO 4-PIECE KIT.: Brand: ENDO CARRY-ON PROCEDURE KIT

## (undated) DEVICE — SOLUTION IV 500ML 0.9% SOD CHL FLX CONT

## (undated) DEVICE — DRAPE XR C ARM 41X74IN LF --

## (undated) DEVICE — SET EXTN TBNG L BOR 4 W STPCOCK ST 32IN PRIMING VOL 6ML

## (undated) DEVICE — Device

## (undated) DEVICE — KIT IV STRT W CHLORAPREP PD 1ML

## (undated) DEVICE — BASIC PACK: Brand: CONVERTORS

## (undated) DEVICE — INTENDED FOR TISSUE SEPARATION, AND OTHER PROCEDURES THAT REQUIRE A SHARP SURGICAL BLADE TO PUNCTURE OR CUT.: Brand: BARD-PARKER ® CARBON RIB-BACK BLADES

## (undated) DEVICE — (D)FCPS GRSP 9MM 230CMLX2.4MM -- DISC BY MFR

## (undated) DEVICE — SET ADMIN 16ML TBNG L100IN 2 Y INJ SITE IV PIGGY BK DISP

## (undated) DEVICE — NEEDLE HYPO 18GA L1.5IN PNK S STL HUB POLYPR SHLD REG BVL

## (undated) DEVICE — CATH IV AUTOGRD BC BLU 22GA 25 -- INSYTE

## (undated) DEVICE — SUTURE MCRYL SZ 4-0 L27IN ABSRB UD L19MM PS-2 1/2 CIR PRIM Y426H

## (undated) DEVICE — DEVON™ KNEE AND BODY STRAP 60" X 3" (1.5 M X 7.6 CM): Brand: DEVON

## (undated) DEVICE — SUT PROL 2-0 30IN SH BLU --

## (undated) DEVICE — DERMABOND SKIN ADH 0.7ML -- DERMABOND ADVANCED 12/BX

## (undated) DEVICE — INFECTION CONTROL KIT SYS

## (undated) DEVICE — BAG SPEC BIOHZD LF 2MIL 6X10IN -- CONVERT TO ITEM 357326

## (undated) DEVICE — BITE BLK ENDOSCP AD 54FR GRN POLYETH ENDOSCP W STRP SLD

## (undated) DEVICE — DRAPE PRB US TRNSDCR 6X96IN --

## (undated) DEVICE — (D)PREP SKN CHLRAPRP APPL 26ML -- CONVERT TO ITEM 371833

## (undated) DEVICE — DRAPE,LAPAROTOMY,T,PEDI,STERILE: Brand: MEDLINE

## (undated) DEVICE — 1200 GUARD II KIT W/5MM TUBE W/O VAC TUBE: Brand: GUARDIAN

## (undated) DEVICE — GUIDEWIRE ENDOSCP L450CM DIA0.035IN STR RND STD STIFF BILI

## (undated) DEVICE — HANDLE LT SNAP ON ULT DURABLE LENS FOR TRUMPF ALC DISPOSABLE

## (undated) DEVICE — NEONATAL-ADULT SPO2 SENSOR: Brand: NELLCOR

## (undated) DEVICE — SUTURE VCRL SZ 3-0 L27IN ABSRB UD L26MM SH 1/2 CIR J416H

## (undated) DEVICE — CONTAINER SPEC 20 ML LID NEUT BUFF FORMALIN 10 % POLYPR STS

## (undated) DEVICE — KIT COMPLIANCE W ENDOGLDE + 11 NO BRSH ENDOKT

## (undated) DEVICE — BW-412T DISP COMBO CLEANING BRUSH: Brand: SINGLE USE COMBINATION CLEANING BRUSH

## (undated) DEVICE — Z CONVERTED USE 2274299 CUFF BLD PRESSURE LNG MED AD 25-35 CM ARM FLEXIPORT DISP

## (undated) DEVICE — NEEDLE HYPO 25GA L1.5IN BVL ORIENTED ECLIPSE

## (undated) DEVICE — DRAPE,REIN 53X77,STERILE: Brand: MEDLINE

## (undated) DEVICE — ABDOMINAL PAD: Brand: DERMACEA

## (undated) DEVICE — STERILE LATEX POWDER-FREE SURGICAL GLOVESWITH NITRILE COATING: Brand: PROTEXIS

## (undated) DEVICE — (D)SYR 10ML 1/5ML GRAD NSAF -- PKGING CHANGE USE ITEM 338027

## (undated) DEVICE — BW-400L DISP SNGL-END CLEANINGBRUSH: Brand: OLYMPUS

## (undated) DEVICE — Device: Brand: SINGLE USE SOFT BRUSH

## (undated) DEVICE — REM POLYHESIVE ADULT PATIENT RETURN ELECTRODE: Brand: VALLEYLAB

## (undated) DEVICE — DRAPE,UTILTY,TAPE,15X26, 4EA/PK: Brand: MEDLINE

## (undated) DEVICE — FORCEPS BX L240CM JAW DIA2.8MM L CAP W/ NDL MIC MESH TOOTH

## (undated) DEVICE — CATH KT GASTMY PEG PSH 20FR --

## (undated) DEVICE — ROCKER SWITCH PENCIL BLADE ELECTRODE, HOLSTER: Brand: EDGE